# Patient Record
Sex: MALE | Race: WHITE | NOT HISPANIC OR LATINO | Employment: PART TIME | URBAN - METROPOLITAN AREA
[De-identification: names, ages, dates, MRNs, and addresses within clinical notes are randomized per-mention and may not be internally consistent; named-entity substitution may affect disease eponyms.]

---

## 2017-09-19 ENCOUNTER — GENERIC CONVERSION - ENCOUNTER (OUTPATIENT)
Dept: OTHER | Facility: OTHER | Age: 52
End: 2017-09-19

## 2017-10-17 ENCOUNTER — ANESTHESIA EVENT (OUTPATIENT)
Dept: GASTROENTEROLOGY | Facility: AMBULARY SURGERY CENTER | Age: 52
End: 2017-10-17
Payer: COMMERCIAL

## 2017-10-17 RX ORDER — DOXAZOSIN MESYLATE 4 MG/1
4 TABLET ORAL
COMMUNITY

## 2017-10-17 RX ORDER — CHOLECALCIFEROL (VITAMIN D3) 1250 MCG
CAPSULE ORAL WEEKLY
COMMUNITY

## 2017-10-17 RX ORDER — TAMSULOSIN HYDROCHLORIDE 0.4 MG/1
0.4 CAPSULE ORAL
COMMUNITY

## 2017-10-17 NOTE — PRE-PROCEDURE INSTRUCTIONS
Pre-Surgery Instructions:   Medication Instructions    Cholecalciferol (VITAMIN D3) 39756 units CAPS Patient was instructed by Physician and understands   doxazosin (CARDURA) 4 mg tablet Patient was instructed by Physician and understands   tamsulosin (FLOMAX) 0 4 mg Patient was instructed by Physician and understands

## 2017-10-17 NOTE — ANESTHESIA PREPROCEDURE EVALUATION
BPH (benign prostatic hyperplasia)    Hypertension        Review of Systems/Medical History          Cardiovascular  Hypertension ,    Pulmonary       GI/Hepatic    Bowel prep       Prostatic disorder, benign prostatic hyperplasia       Endo/Other     GYN       Hematology   Musculoskeletal       Neurology   Psychology           Physical Exam    Airway    Mallampati score: II  TM Distance: >3 FB  Neck ROM: full     Dental       Cardiovascular  Rhythm: regular, Rate: normal,     Pulmonary  Breath sounds clear to auscultation,     Other Findings        Anesthesia Plan  ASA Score- 2       Anesthesia Type- IV sedation with anesthesia with ASA Monitors  Additional Monitors:   Airway Plan:           Induction- intravenous  Informed Consent- Anesthetic plan and risks discussed with patient

## 2017-10-18 ENCOUNTER — HOSPITAL ENCOUNTER (OUTPATIENT)
Facility: AMBULARY SURGERY CENTER | Age: 52
Setting detail: OUTPATIENT SURGERY
Discharge: HOME | End: 2017-10-18
Attending: INTERNAL MEDICINE | Admitting: INTERNAL MEDICINE
Payer: COMMERCIAL

## 2017-10-18 ENCOUNTER — ANESTHESIA (OUTPATIENT)
Dept: GASTROENTEROLOGY | Facility: AMBULARY SURGERY CENTER | Age: 52
End: 2017-10-18
Payer: COMMERCIAL

## 2017-10-18 ENCOUNTER — GENERIC CONVERSION - ENCOUNTER (OUTPATIENT)
Dept: OTHER | Facility: OTHER | Age: 52
End: 2017-10-18

## 2017-10-18 VITALS
HEART RATE: 80 BPM | OXYGEN SATURATION: 97 % | TEMPERATURE: 98.2 F | SYSTOLIC BLOOD PRESSURE: 155 MMHG | RESPIRATION RATE: 18 BRPM | HEIGHT: 71 IN | DIASTOLIC BLOOD PRESSURE: 88 MMHG

## 2017-10-18 RX ORDER — PROPOFOL 10 MG/ML
INJECTION, EMULSION INTRAVENOUS AS NEEDED
Status: DISCONTINUED | OUTPATIENT
Start: 2017-10-18 | End: 2017-10-18 | Stop reason: SURG

## 2017-10-18 RX ORDER — SODIUM CHLORIDE, SODIUM LACTATE, POTASSIUM CHLORIDE, CALCIUM CHLORIDE 600; 310; 30; 20 MG/100ML; MG/100ML; MG/100ML; MG/100ML
125 INJECTION, SOLUTION INTRAVENOUS CONTINUOUS
Status: DISCONTINUED | OUTPATIENT
Start: 2017-10-18 | End: 2017-10-18 | Stop reason: HOSPADM

## 2017-10-18 RX ADMIN — SODIUM CHLORIDE, SODIUM LACTATE, POTASSIUM CHLORIDE, AND CALCIUM CHLORIDE 125 ML/HR: .6; .31; .03; .02 INJECTION, SOLUTION INTRAVENOUS at 10:08

## 2017-10-18 RX ADMIN — SODIUM CHLORIDE, SODIUM LACTATE, POTASSIUM CHLORIDE, AND CALCIUM CHLORIDE: .6; .31; .03; .02 INJECTION, SOLUTION INTRAVENOUS at 10:19

## 2017-10-18 RX ADMIN — PROPOFOL 50 MG: 10 INJECTION, EMULSION INTRAVENOUS at 10:35

## 2017-10-18 RX ADMIN — PROPOFOL 100 MG: 10 INJECTION, EMULSION INTRAVENOUS at 10:30

## 2017-10-18 NOTE — OP NOTE
COLONOSCOPY    PROCEDURE: Colonoscopy    INDICATIONS: Rectal Bleeding    POST-OP DIAGNOSIS: See the impression below    SEDATION: Monitored anesthesia care, check anesthesia records    PHYSICAL EXAM:    /93   Pulse 89   Temp 98 2 °F (36 8 °C) (Tympanic)   Resp 18   Ht 5' 11" (1 803 m)   SpO2 99%   There is no height or weight on file to calculate BMI  General: NAD  Heart: S1 & S2 normal, RRR  Lungs: CTA, No rales or rhonchi  Abdomen: Soft, nontender, nondistended, good bowel sounds    CONSENT:  Informed consent was obtained for the procedure, including sedation after explaining the risks and benefits of the procedure  Risks including but not limited to bleeding, perforation, infection, aspiration were discussed in detail  Also explained about less than 100%$ sensitivity with the exam and other alternatives  PREPARATION:   EKG tracing, pulse oximetry, blood pressure were monitored throughout the procedure  Patient was identified by myself both verbally and by visual inspection of ID band  DESCRIPTION:   Patient was placed in the left lateral decubitus position and was sedated with the above medication  Digital rectal examination was performed  The colonoscope was introduced in to the anal canal and advanced up to cecum, which was identified by the appendiceal orifice and IC valve  A careful inspection was made as the colonoscope was withdrawn, including a retroflexed view of the rectum; findings and interventions are described below  Appropriate photodocumentation was obtained  The quality of the colonic preparation was adequate  FINDINGS:    1  Cecum and ileocecal valve-normal mucosa    2  Scarring was noted above the dentate line from previous banding  Small internal hemorrhoids seen           IMPRESSIONS:      As above    RECOMMENDATIONS:    Anusol HC suppositories twice a day as needed    Next colonoscopy in 10 years    COMPLICATIONS:  None; patient tolerated the procedure well     DISPOSITION: PACU           CONDITION: Stable

## 2017-10-18 NOTE — ANESTHESIA POSTPROCEDURE EVALUATION
Post-Op Assessment Note      CV Status:  Stable    Mental Status:  Somnolent    Hydration Status:  Stable    PONV Controlled:  None    Airway Patency:  Patent    Post Op Vitals Reviewed: Yes          Staff: Anesthesiologist           BP      Temp      Pulse     Resp      SpO2

## 2017-10-18 NOTE — H&P
History and Physical -  [facility]  Yinka Mejia 47 y o  male MRN: [id number]        HPI:  58-year-old male with history of hypertension reports having fresh bleeding from the rectum  He had hemorrhoidal banding in the past     Historical Information   Past Medical History:   Diagnosis Date    BPH (benign prostatic hyperplasia)     Hypertension      Past Surgical History:   Procedure Laterality Date    HEMORRHOID SURGERY       Social History   History   Alcohol Use No     History   Drug Use No     History   Smoking Status    Never Smoker   Smokeless Tobacco    Never Used     Family History   Problem Relation Age of Onset    Heart disease Mother     Cancer Father      stomach    Hypertension Father        Meds/Allergies     Prescriptions Prior to Admission   Medication    Cholecalciferol (VITAMIN D3) 91746 units CAPS    doxazosin (CARDURA) 4 mg tablet    tamsulosin (FLOMAX) 0 4 mg       No Known Allergies    Objective     Blood pressure 158/93, pulse 89, temperature 98 2 °F (36 8 °C), temperature source Tympanic, resp  rate 18, height 5' 11" (1 803 m), SpO2 99 %      PHYSICAL EXAM:    Gen: NAD  CV: S1 & S2 normal, RRR  CHEST: Clear to auscultate  ABD: soft, NT/ND, good bowel sounds  EXT: no edema    ASSESSMENT:     Rectal bleeding    PLAN:    Colonoscopy

## 2017-10-23 ENCOUNTER — GENERIC CONVERSION - ENCOUNTER (OUTPATIENT)
Dept: OTHER | Facility: OTHER | Age: 52
End: 2017-10-23

## 2018-01-10 NOTE — MISCELLANEOUS
October 23, 2017    Dear Jose Barr,    We have attempted to contact you  Please contact our office at your convenience       Thank you,    [facility]        Electronically signed by:Carolann Hawkins MA  Oct 23 2017  8:36AM EST

## 2018-01-22 VITALS
TEMPERATURE: 98.8 F | RESPIRATION RATE: 16 BRPM | SYSTOLIC BLOOD PRESSURE: 124 MMHG | HEART RATE: 99 BPM | WEIGHT: 224.38 LBS | HEIGHT: 71 IN | OXYGEN SATURATION: 98 % | BODY MASS INDEX: 31.41 KG/M2 | DIASTOLIC BLOOD PRESSURE: 70 MMHG

## 2018-09-05 ENCOUNTER — OFFICE VISIT (OUTPATIENT)
Dept: PODIATRY | Facility: CLINIC | Age: 53
End: 2018-09-05
Payer: COMMERCIAL

## 2018-09-05 VITALS
DIASTOLIC BLOOD PRESSURE: 92 MMHG | HEIGHT: 71 IN | WEIGHT: 224 LBS | SYSTOLIC BLOOD PRESSURE: 136 MMHG | BODY MASS INDEX: 31.36 KG/M2

## 2018-09-05 DIAGNOSIS — M77.51 CAPSULITIS OF METATARSOPHALANGEAL (MTP) JOINT OF RIGHT FOOT: ICD-10-CM

## 2018-09-05 DIAGNOSIS — L60.0 INGROWN TOENAIL: ICD-10-CM

## 2018-09-05 DIAGNOSIS — Q66.52 CONGENITAL PES PLANUS OF LEFT FOOT: ICD-10-CM

## 2018-09-05 DIAGNOSIS — M72.2 PLANTAR FASCIITIS: Primary | ICD-10-CM

## 2018-09-05 DIAGNOSIS — M20.41 HAMMER TOE OF RIGHT FOOT: ICD-10-CM

## 2018-09-05 DIAGNOSIS — Q66.51 CONGENITAL PES PLANUS OF RIGHT FOOT: ICD-10-CM

## 2018-09-05 DIAGNOSIS — B35.1 ONYCHOMYCOSIS: ICD-10-CM

## 2018-09-05 PROCEDURE — L3000 FT INSERT UCB BERKELEY SHELL: HCPCS | Performed by: PODIATRIST

## 2018-09-05 PROCEDURE — 99213 OFFICE O/P EST LOW 20 MIN: CPT | Performed by: PODIATRIST

## 2018-09-05 NOTE — PROGRESS NOTES
Assessment/Plan:    Cast orthotics bilateral feet sulcus length graphite intrinsic heel    Applied toe strapping to right 2nd digit in order to straighten toe  Patient was instructed on how to apply this himself in order to facilitate proper healing    Discussed possible need for surgery if pain and deformity not resolved    Partial nail avulsion bilateral hallux nails reduced    Discussed proper shoes  Discussed stretching icing  Discussed NSAIDs risks and benefits  Follow-up 1 month       Diagnoses and all orders for this visit:    Plantar fasciitis    Congenital pes planus of right foot    Congenital pes planus of left foot    Ingrown toenail    Onychomycosis          Subjective:      Patient ID: Demarcus Mejia is a 47 y o  male  Patient has history of chronic heel pain bilateral   Patient has been wearing old orthotics but says they have not been helping  Rates pain in feet right foot 6/10 pain scale, left foot 3/10 pain scale  Pain worse 1st step in morning  Patient also has mild nail fungus and gets recurrent ingrown nails bilateral big toes  Patient has not noticed any redness or signs of infection  The patient bumped his right foot 3 months ago and since then he has had some medial deviation at 2nd MPJ of the 2nd digit  This causes some pain in the toe there is a contraction of the toe and mild hammertoe  Patient does get some pain in 2nd MP joint on off          Past Medical History:   Diagnosis Date    BPH (benign prostatic hyperplasia)     Hypertension          Current Outpatient Prescriptions:     Cholecalciferol (VITAMIN D3) 86213 units CAPS, Take by mouth once a week, Disp: , Rfl:     doxazosin (CARDURA) 4 mg tablet, Take 4 mg by mouth daily at bedtime, Disp: , Rfl:     tamsulosin (FLOMAX) 0 4 mg, Take 0 4 mg by mouth daily with dinner, Disp: , Rfl:     Past Surgical History:   Procedure Laterality Date    COLONOSCOPY N/A 10/18/2017    Procedure: COLONOSCOPY;  Surgeon: Glenroy Crump Kalen Medeiros MD;  Location: [place] [address] [facility] LAB; Service: Gastroenterology    HEMORRHOID SURGERY         No Known Allergies    There is no problem list on file for this patient  Review of Systems   Constitutional: Negative  HENT: Negative  Eyes: Negative  Respiratory: Negative  Cardiovascular: Negative  Gastrointestinal: Negative  Endocrine: Negative  Genitourinary: Negative  Musculoskeletal: Negative  Skin: Negative  Allergic/Immunologic: Negative  Neurological: Negative  Hematological: Negative  Psychiatric/Behavioral: Negative  Objective:  Patient's shoes and socks were removed, feet examined       /92   Ht 5' 11" (1 803 m)   Wt 102 kg (224 lb)   BMI 31 24 kg/m²          Physical Exam      Pulses palpable bilateral PT DP and PT pulses 2/4 bilateral  Significant pes planus bilateral, there is decreased  of the arch on weight-bearing  Right 2nd digit there is medial deviation of the PIPJ mild pain on palpation of 2nd MPJ mild swelling  Severe hallux limitus bilateral left worse than right less than 15° of dorsiflexion 1st ray  No hypermobility of the 1st ray  Muscle strength 5/5 all groups bilateral feet and ankles  Moderate equinus bilateral 5° dorsiflexion knee flexed and extended  Negative edema negative erythema no signs of infection  Nails are thickened discolored dystrophic mild onychomycosis  Ingrown nail both borders bilateral hallux

## 2018-11-28 ENCOUNTER — OFFICE VISIT (OUTPATIENT)
Dept: PODIATRY | Facility: CLINIC | Age: 53
End: 2018-11-28
Payer: COMMERCIAL

## 2018-11-28 VITALS
BODY MASS INDEX: 31.36 KG/M2 | HEIGHT: 71 IN | DIASTOLIC BLOOD PRESSURE: 82 MMHG | HEART RATE: 79 BPM | SYSTOLIC BLOOD PRESSURE: 131 MMHG | RESPIRATION RATE: 17 BRPM | WEIGHT: 224 LBS

## 2018-11-28 DIAGNOSIS — M20.42 HAMMER TOES OF BOTH FEET: ICD-10-CM

## 2018-11-28 DIAGNOSIS — M20.41 HAMMER TOES OF BOTH FEET: ICD-10-CM

## 2018-11-28 DIAGNOSIS — M72.2 PLANTAR FASCIITIS: ICD-10-CM

## 2018-11-28 DIAGNOSIS — L60.0 INGROWN TOENAIL: Primary | ICD-10-CM

## 2018-11-28 PROCEDURE — 99213 OFFICE O/P EST LOW 20 MIN: CPT | Performed by: PODIATRIST

## 2018-11-28 NOTE — PROGRESS NOTES
Assessment/Plan:    Patient was given a toe  for bilateral 2nd digit  Aseptic debridement and planning of nails x10 and manually and mechanically  Wedge resection of ingrown nail left hallux medial border  Discussed possible need for matricectomy  Patient will wear orthotics daily continue with stretching icing  Follow-up 2 months     Diagnoses and all orders for this visit:    Ingrown toenail    Hammer toes of both feet    Plantar fasciitis          Subjective:      Patient ID: Candice Manzanares is a 46 y o  male  Patient is follow-up for bilateral plantar fasciitis many years  Has gotten new orthotics has been stretching and icing pain is improved rates pain 4/10 pain scale pain is on off  Patient has chronic ingrown nail left big toe medial border  Has not noticed any redness or signs of infection but has been having pain  Patient has pain in contracted 2nd digit hammertoe bilateral right worse than left  Past Medical History:   Diagnosis Date    BPH (benign prostatic hyperplasia)     Hypertension          Current Outpatient Prescriptions:     Cholecalciferol (VITAMIN D3) 49741 units CAPS, Take by mouth once a week, Disp: , Rfl:     doxazosin (CARDURA) 4 mg tablet, Take 4 mg by mouth daily at bedtime, Disp: , Rfl:     tamsulosin (FLOMAX) 0 4 mg, Take 0 4 mg by mouth daily with dinner, Disp: , Rfl:     Past Surgical History:   Procedure Laterality Date    COLONOSCOPY N/A 10/18/2017    Procedure: COLONOSCOPY;  Surgeon: Karissa Hayden MD;  Location: [facility]; Service: Gastroenterology    HEMORRHOID SURGERY         No Known Allergies    There is no problem list on file for this patient  Review of Systems   Constitutional: Negative  HENT: Negative  Eyes: Negative  Respiratory: Negative  Cardiovascular: Negative  Gastrointestinal: Negative  Endocrine: Negative  Genitourinary: Negative  Musculoskeletal: Negative  Skin: Negative  Allergic/Immunologic: Negative  Neurological: Negative  Hematological: Negative  Psychiatric/Behavioral: Negative  Objective:  Patient's shoes and socks were removed, feet examined  /82   Pulse 79   Resp 17   Ht 5' 11" (1 803 m)   Wt 102 kg (224 lb)   BMI 31 24 kg/m²          Physical Exam      Pulses palpable bilateral PT DP and PT pulses 2/4 bilateral  Significant pes planus bilateral, there is decreased  of the arch on weight-bearing  Right 2nd digit there is medial deviation of the PIPJ mild pain on palpation of 2nd MPJ mild swelling  Severe hallux limitus bilateral left worse than right less than 15° of dorsiflexion 1st ray  No hypermobility of the 1st ray  Muscle strength 5/5 all groups bilateral feet and ankles  Moderate equinus bilateral 5° dorsiflexion knee flexed and extended  Negative edema negative erythema no signs of infection  Nails are thickened discolored dystrophic mild onychomycosis  Ingrown nail both borders bilateral hallux    Ingrown right hallux medial border    Mild hyperkeratotic lesion on the medial border but no sign of infection  Mild pain on palpation plantar medial heel bilateral  Some rigid hammertoes 2nd digit bilateral

## 2019-02-13 ENCOUNTER — OFFICE VISIT (OUTPATIENT)
Dept: PODIATRY | Facility: CLINIC | Age: 54
End: 2019-02-13
Payer: COMMERCIAL

## 2019-02-13 VITALS
HEART RATE: 89 BPM | BODY MASS INDEX: 31.36 KG/M2 | RESPIRATION RATE: 17 BRPM | HEIGHT: 71 IN | SYSTOLIC BLOOD PRESSURE: 124 MMHG | WEIGHT: 224 LBS | DIASTOLIC BLOOD PRESSURE: 85 MMHG

## 2019-02-13 DIAGNOSIS — L60.0 INGROWN TOENAIL: Primary | ICD-10-CM

## 2019-02-13 DIAGNOSIS — B35.1 ONYCHOMYCOSIS: ICD-10-CM

## 2019-02-13 DIAGNOSIS — M72.2 PLANTAR FASCIITIS: ICD-10-CM

## 2019-02-13 PROCEDURE — 99213 OFFICE O/P EST LOW 20 MIN: CPT | Performed by: PODIATRIST

## 2019-02-13 NOTE — PROGRESS NOTES
Assessment/Plan:  Partial resection of ingrown nail left hallux medial border applied silver nitrate    Aseptic debridement and planning of nails x10 and manually and mechanically  Patient will wear orthotics daily  Discussed importance of stretching and icing  Patient will consider injection or physical therapy  Follow-up 2 months     Diagnoses and all orders for this visit:    Ingrown toenail    Plantar fasciitis    Onychomycosis          Subjective:      Patient ID: Rachel Gaspar is a 46 y o  male  Patient is follow-up for bilateral plantar fasciitis many years  Has been wearing orthotics  Pain is improved  His mostly just getting pain in morning and later in day  Rates pain between 2 and 410 on a pain scale  Has been stretching but not daily  Patient has recurrent ingrown nail left hallux medial border  Does have some swelling in the border but has not noticed any redness or drainage      Past Medical History:   Diagnosis Date    BPH (benign prostatic hyperplasia)     Hypertension          Current Outpatient Medications:     Cholecalciferol (VITAMIN D3) 84280 units CAPS, Take by mouth once a week, Disp: , Rfl:     doxazosin (CARDURA) 4 mg tablet, Take 4 mg by mouth daily at bedtime, Disp: , Rfl:     tamsulosin (FLOMAX) 0 4 mg, Take 0 4 mg by mouth daily with dinner, Disp: , Rfl:     Past Surgical History:   Procedure Laterality Date    COLONOSCOPY N/A 10/18/2017    Procedure: COLONOSCOPY;  Surgeon: Derek Robertson MD;  Location: [place] [address] GI LAB; Service: Gastroenterology    HEMORRHOID SURGERY         No Known Allergies    There is no problem list on file for this patient  Review of Systems   Constitutional: Negative  HENT: Negative  Eyes: Negative  Respiratory: Negative  Cardiovascular: Negative  Gastrointestinal: Negative  Endocrine: Negative  Genitourinary: Negative  Musculoskeletal: Negative  Skin: Negative  Allergic/Immunologic: Negative      Neurological: Negative  Hematological: Negative  Psychiatric/Behavioral: Negative  Objective:  Patient's shoes and socks were removed, feet examined  /85   Pulse 89   Resp 17   Ht 5' 11" (1 803 m)   Wt 102 kg (224 lb)   BMI 31 24 kg/m²          Physical Exam      Pulses palpable bilateral PT DP and PT pulses 2/4 bilateral  Significant pes planus bilateral, there is decreased  of the arch on weight-bearing  Right 2nd digit there is medial deviation of the PIPJ mild pain on palpation of 2nd MPJ mild swelling  Severe hallux limitus bilateral left worse than right less than 15° of dorsiflexion 1st ray  No hypermobility of the 1st ray  Muscle strength 5/5 all groups bilateral feet and ankles  Moderate equinus bilateral 5° dorsiflexion knee flexed and extended  Negative edema negative erythema no signs of infection  Nails are thickened discolored dystrophic mild onychomycosis  Ingrown nail both borders bilateral hallux    Left hallux medial border positive ingrown mild swelling mild paronychia negative erythema negative purulence negative abscess    Mild pain on palpation plantar medial heel right worse than left    Significant pes planus bilateral  Negative erythema no edema no signs of infection

## 2019-04-24 ENCOUNTER — OFFICE VISIT (OUTPATIENT)
Dept: PODIATRY | Facility: CLINIC | Age: 54
End: 2019-04-24
Payer: COMMERCIAL

## 2019-04-24 VITALS
HEIGHT: 71 IN | BODY MASS INDEX: 31.36 KG/M2 | DIASTOLIC BLOOD PRESSURE: 80 MMHG | WEIGHT: 224 LBS | SYSTOLIC BLOOD PRESSURE: 125 MMHG

## 2019-04-24 DIAGNOSIS — Q66.52 CONGENITAL PES PLANUS OF LEFT FOOT: ICD-10-CM

## 2019-04-24 DIAGNOSIS — M72.2 PLANTAR FASCIITIS: Primary | ICD-10-CM

## 2019-04-24 DIAGNOSIS — Q66.51 CONGENITAL PES PLANUS OF RIGHT FOOT: ICD-10-CM

## 2019-04-24 PROCEDURE — 99213 OFFICE O/P EST LOW 20 MIN: CPT | Performed by: PODIATRIST

## 2019-07-03 ENCOUNTER — OFFICE VISIT (OUTPATIENT)
Dept: PODIATRY | Facility: CLINIC | Age: 54
End: 2019-07-03
Payer: COMMERCIAL

## 2019-07-03 VITALS — HEIGHT: 71 IN | WEIGHT: 224 LBS | BODY MASS INDEX: 31.36 KG/M2

## 2019-07-03 DIAGNOSIS — M20.41 HAMMER TOES OF BOTH FEET: ICD-10-CM

## 2019-07-03 DIAGNOSIS — M20.42 HAMMER TOES OF BOTH FEET: ICD-10-CM

## 2019-07-03 DIAGNOSIS — M72.2 PLANTAR FASCIITIS: Primary | ICD-10-CM

## 2019-07-03 PROCEDURE — 99213 OFFICE O/P EST LOW 20 MIN: CPT | Performed by: PODIATRIST

## 2019-07-03 NOTE — PROGRESS NOTES
Assessment/Plan:    Patient will use Voltaren Gel sparingly discussed side effects of medication patient will use only as needed   Injection or physical therapy if not improving  Continue with orthotics continue with stretching icing  Nails reduced  Discussed surgical conservative options for hammertoes  Patient given hammertoe cushion  Follow-up 1 month     Diagnoses and all orders for this visit:    Plantar fasciitis  -     diclofenac sodium (VOLTAREN) 1 %; Apply 4 g topically 4 (four) times a day    Hammer toes of both feet          Subjective:      Patient ID: Winston Miranda is a 46 y o  male  Patient is is follow-up for bilateral heel pain right worse than left  Patient rates pain right 5/10 pain scale pain left 3/10 pain scale  Patient has been wearing orthotics patient has been stretching icing wearing better shoes  Patient has pain when when walking and standing  Patient also have significant pain in hammertoes bilateral right worse than left  Most pain in the right 2nd digit hammertoe  Pain mostly in certain shoes  Past Medical History:   Diagnosis Date    BPH (benign prostatic hyperplasia)     Hypertension          Current Outpatient Medications:     Cholecalciferol (VITAMIN D3) 67241 units CAPS, Take by mouth once a week, Disp: , Rfl:     diclofenac sodium (VOLTAREN) 1 %, Apply 4 g topically 4 (four) times a day, Disp: 4 Tube, Rfl: 1    doxazosin (CARDURA) 4 mg tablet, Take 4 mg by mouth daily at bedtime, Disp: , Rfl:     tamsulosin (FLOMAX) 0 4 mg, Take 0 4 mg by mouth daily with dinner, Disp: , Rfl:     Past Surgical History:   Procedure Laterality Date    COLONOSCOPY N/A 10/18/2017    Procedure: COLONOSCOPY;  Surgeon: Lewis Ortiz MD;  Location: [place] [address] GI LAB; Service: Gastroenterology    HEMORRHOID SURGERY         No Known Allergies    There is no problem list on file for this patient  Review of Systems   Constitutional: Negative  HENT: Negative  Eyes: Negative  Respiratory: Negative  Cardiovascular: Negative  Gastrointestinal: Negative  Endocrine: Negative  Genitourinary: Negative  Musculoskeletal: Negative  Skin: Negative  Allergic/Immunologic: Negative  Neurological: Negative  Hematological: Negative  Psychiatric/Behavioral: Negative  Objective:  Patient's shoes and socks were removed, feet examined       Ht 5' 11" (1 803 m)   Wt 102 kg (224 lb)   BMI 31 24 kg/m²          Physical Exam      Pulses palpable bilateral PT DP and PT pulses 2/4 bilateral  Significant pes planus bilateral, there is decreased  of the arch on weight-bearing  Right 2nd digit there is medial deviation of the PIPJ mild pain on palpation of 2nd MPJ mild swelling  Severe hallux limitus bilateral left worse than right less than 15° of dorsiflexion 1st ray  No hypermobility of the 1st ray  Muscle strength 5/5 all groups bilateral feet and ankles      Negative edema negative erythema no signs of infection  Nails are thickened discolored dystrophic mild onychomycosis    Severe pes planus bilateral  Significant equinus bilateral 0° dorsiflexion knee flexed and extended  Pain on palpation plantar medial heel right worse than left negative erythema negative edema  Plantar fascia is intact  Muscle strength intact all groups    Painful hammertoe right 2nd digit Pain dorsal PIPJ

## 2019-09-05 ENCOUNTER — OFFICE VISIT (OUTPATIENT)
Dept: PODIATRY | Facility: CLINIC | Age: 54
End: 2019-09-05
Payer: COMMERCIAL

## 2019-09-05 VITALS
HEART RATE: 93 BPM | HEIGHT: 71 IN | BODY MASS INDEX: 31.36 KG/M2 | RESPIRATION RATE: 17 BRPM | SYSTOLIC BLOOD PRESSURE: 132 MMHG | DIASTOLIC BLOOD PRESSURE: 86 MMHG | WEIGHT: 224 LBS

## 2019-09-05 DIAGNOSIS — Q66.52 CONGENITAL PES PLANUS OF LEFT FOOT: ICD-10-CM

## 2019-09-05 DIAGNOSIS — M79.672 PAIN IN BOTH FEET: ICD-10-CM

## 2019-09-05 DIAGNOSIS — B35.3 TINEA PEDIS OF BOTH FEET: ICD-10-CM

## 2019-09-05 DIAGNOSIS — M72.2 PLANTAR FASCIITIS: Primary | ICD-10-CM

## 2019-09-05 DIAGNOSIS — Q66.51 CONGENITAL PES PLANUS OF RIGHT FOOT: ICD-10-CM

## 2019-09-05 DIAGNOSIS — M79.671 PAIN IN BOTH FEET: ICD-10-CM

## 2019-09-05 PROCEDURE — L3000 FT INSERT UCB BERKELEY SHELL: HCPCS | Performed by: PODIATRIST

## 2019-09-05 PROCEDURE — 99213 OFFICE O/P EST LOW 20 MIN: CPT | Performed by: PODIATRIST

## 2019-09-05 NOTE — PROGRESS NOTES
Expand All Collapse All    Assessment/Plan:     Patient will use Voltaren Gel sparingly discussed side effects of medication patient will use only as needed   Injection or physical therapy if not improving  Continue with orthotics continue with stretching icing    Patient's feet casted for custom molded foot orthotics  Nails reduced  Discussed surgical conservative options for hammertoes  Patient given hammertoe cushion  Follow-up in 2 months       Diagnoses and all orders for this visit:     Plantar fasciitis  -     diclofenac sodium (VOLTAREN) 1 %; Apply 4 g topically 4 (four) times a day     Hammer toes of both feet            Subjective:       Patient ID: Kory Yanes is a 46 y o  male      Patient is is follow-up for bilateral heel pain right worse than left  Patient rates pain right 5/10 pain scale pain left 3/10 pain scale  Patient has been wearing orthotics patient has been stretching icing wearing better shoes  Patient has pain when when walking and standing  Patient also have significant pain in hammertoes bilateral right worse than left  Most pain in the right 2nd digit hammertoe  Pain mostly in certain shoes         Medical History        Past Medical History:   Diagnosis Date    BPH (benign prostatic hyperplasia)      Hypertension                 Current Outpatient Medications:     Cholecalciferol (VITAMIN D3) 15712 units CAPS, Take by mouth once a week, Disp: , Rfl:     diclofenac sodium (VOLTAREN) 1 %, Apply 4 g topically 4 (four) times a day, Disp: 4 Tube, Rfl: 1    doxazosin (CARDURA) 4 mg tablet, Take 4 mg by mouth daily at bedtime, Disp: , Rfl:     tamsulosin (FLOMAX) 0 4 mg, Take 0 4 mg by mouth daily with dinner, Disp: , Rfl:      Surgical History         Past Surgical History:   Procedure Laterality Date    COLONOSCOPY N/A 10/18/2017     Procedure: COLONOSCOPY;  Surgeon: Fariha Alanis MD;  Location: [facility] GI [facility];   Service: Gastroenterology    HEMORRHOID SURGERY           No Known Allergies     There is no problem list on file for this patient         Review of Systems   Constitutional: Negative  HENT: Negative  Eyes: Negative  Respiratory: Negative  Cardiovascular: Negative  Gastrointestinal: Negative  Endocrine: Negative  Genitourinary: Negative  Musculoskeletal: Negative  Skin: Negative  Allergic/Immunologic: Negative  Neurological: Negative  Hematological: Negative      Psychiatric/Behavioral: Negative            Objective:  Patient's shoes and socks were removed, feet examined       Ht 5' 11" (1 803 m)   Wt 102 kg (224 lb)   BMI 31 24 kg/m²             Physical Exam       Pulses palpable bilateral PT DP and PT pulses 2/4 bilateral  Significant pes planus bilateral, there is decreased  of the arch on weight-bearing  Right 2nd digit there is medial deviation of the PIPJ mild pain on palpation of 2nd MPJ mild swelling  Severe hallux limitus bilateral left worse than right less than 15° of dorsiflexion 1st ray  No hypermobility of the 1st ray  Muscle strength 5/5 all groups bilateral feet and ankles        Negative edema negative erythema no signs of infection  Nails are thickened discolored dystrophic mild onychomycosis     Severe pes planus bilateral  Significant equinus bilateral 0° dorsiflexion knee flexed and extended  Pain on palpation plantar medial heel right worse than left negative erythema negative edema  Plantar fascia is intact  Muscle strength intact all groups     Painful hammertoe right 2nd digit Pain dorsal PIPJ

## 2019-09-20 ENCOUNTER — TELEPHONE (OUTPATIENT)
Dept: GASTROENTEROLOGY | Facility: CLINIC | Age: 54
End: 2019-09-20

## 2019-09-20 NOTE — TELEPHONE ENCOUNTER
called both number, no voice mail and second number a property company   will mail out letter informing patient of cancellation of apt on 09/26/2019

## 2019-11-14 ENCOUNTER — OFFICE VISIT (OUTPATIENT)
Dept: PODIATRY | Facility: CLINIC | Age: 54
End: 2019-11-14
Payer: COMMERCIAL

## 2019-11-14 VITALS
DIASTOLIC BLOOD PRESSURE: 80 MMHG | SYSTOLIC BLOOD PRESSURE: 138 MMHG | HEIGHT: 71 IN | WEIGHT: 224 LBS | RESPIRATION RATE: 17 BRPM | BODY MASS INDEX: 31.36 KG/M2

## 2019-11-14 DIAGNOSIS — M79.671 PAIN IN BOTH FEET: ICD-10-CM

## 2019-11-14 DIAGNOSIS — M72.2 PLANTAR FASCIITIS: Primary | ICD-10-CM

## 2019-11-14 DIAGNOSIS — M79.672 PAIN IN BOTH FEET: ICD-10-CM

## 2019-11-14 DIAGNOSIS — M20.42 HAMMER TOES OF BOTH FEET: ICD-10-CM

## 2019-11-14 DIAGNOSIS — L60.0 INGROWN TOENAIL: ICD-10-CM

## 2019-11-14 DIAGNOSIS — M20.41 HAMMER TOES OF BOTH FEET: ICD-10-CM

## 2019-11-14 PROCEDURE — 99213 OFFICE O/P EST LOW 20 MIN: CPT | Performed by: PODIATRIST

## 2019-11-14 RX ORDER — MELOXICAM 7.5 MG/1
7.5 TABLET ORAL DAILY
Qty: 10 TABLET | Refills: 0 | Status: SHIPPED | OUTPATIENT
Start: 2019-11-14 | End: 2019-11-24

## 2019-11-14 NOTE — PROGRESS NOTES
Assessment/Plan:     Patient will use Voltaren Gel sparingly discussed side effects of medication patient will use only as needed   Injection or physical therapy if not improving  Continue with orthotics continue with stretching and icing  We will add Mobic         Nails reduced  Discussed surgical conservative options for hammertoes  Patient given hammertoe cushion  Follow-up in 2 months       Diagnoses and all orders for this visit:     Plantar fasciitis  -     diclofenac sodium (VOLTAREN) 1 %; Apply 4 g topically 4 (four) times a day     Hammer toes of both feet           Subjective:       Patient ID: Jose Olson is a 46 y  o  male      Patient is is follow-up for bilateral heel pain right worse than left   Patient rates pain right 5/10 pain scale pain left 3/10 pain scale   Patient has been wearing orthotics patient has been stretching icing wearing better shoes   Patient has pain when when walking and standing  Patient also have significant pain in hammertoes bilateral right worse than left   Most pain in the right 2nd digit hammertoe   Pain mostly in certain shoes         Medical History           Past Medical History:   Diagnosis Date    BPH (benign prostatic hyperplasia)      Hypertension                 Current Outpatient Medications:     Cholecalciferol (VITAMIN D3) 62506 units CAPS, Take by mouth once a week, Disp: , Rfl:     diclofenac sodium (VOLTAREN) 1 %, Apply 4 g topically 4 (four) times a day, Disp: 4 Tube, Rfl: 1    doxazosin (CARDURA) 4 mg tablet, Take 4 mg by mouth daily at bedtime, Disp: , Rfl:     tamsulosin (FLOMAX) 0 4 mg, Take 0 4 mg by mouth daily with dinner, Disp: , Rfl:      Surgical History             Past Surgical History:   Procedure Laterality Date    COLONOSCOPY N/A 10/18/2017     Procedure: COLONOSCOPY;  Surgeon: Chelsea Miller MD;  Location: [place] [address] [facility];  Service: Gastroenterology    HEMORRHOID SURGERY                No Known Allergies     There is no problem list on file for this patient         Review of Systems   Constitutional: Negative     HENT: Negative     Eyes: Negative     Respiratory: Negative     Cardiovascular: Negative     Gastrointestinal: Negative     Endocrine: Negative     Genitourinary: Negative     Musculoskeletal: Negative     Skin: Negative     Allergic/Immunologic: Negative     Neurological: Negative     Hematological: Negative     Psychiatric/Behavioral: Negative           Objective:  Patient's shoes and socks were removed, feet examined       Ht 5' 11" (1 803 m)   Wt 102 kg (224 lb)   BMI 31 24 kg/m²             Physical Exam       Pulses palpable bilateral PT DP and PT pulses 2/4 bilateral  Significant pes planus bilateral, there is decreased  of the arch on weight-bearing  Right 2nd digit there is medial deviation of the PIPJ mild pain on palpation of 2nd MPJ mild swelling  Severe hallux limitus bilateral left worse than right less than 15° of dorsiflexion 1st ray  No hypermobility of the 1st ray  Muscle strength 5/5 all groups bilateral feet and ankles        Negative edema negative erythema no signs of infection  Nails are thickened discolored dystrophic mild onychomycosis     Severe pes planus bilateral  Significant equinus bilateral 0° dorsiflexion knee flexed and extended  Pain on palpation plantar medial heel right worse than left negative erythema negative edema  Plantar fascia is intact  Muscle strength intact all groups     Painful hammertoe right 2nd digit Pain dorsal PIPJ

## 2019-11-20 ENCOUNTER — OFFICE VISIT (OUTPATIENT)
Dept: GASTROENTEROLOGY | Facility: CLINIC | Age: 54
End: 2019-11-20
Payer: COMMERCIAL

## 2019-11-20 VITALS
DIASTOLIC BLOOD PRESSURE: 86 MMHG | TEMPERATURE: 98.4 F | HEART RATE: 88 BPM | BODY MASS INDEX: 31.99 KG/M2 | HEIGHT: 71 IN | SYSTOLIC BLOOD PRESSURE: 144 MMHG | WEIGHT: 228.5 LBS

## 2019-11-20 DIAGNOSIS — R19.5 DARK STOOLS: Primary | ICD-10-CM

## 2019-11-20 PROCEDURE — 99214 OFFICE O/P EST MOD 30 MIN: CPT | Performed by: INTERNAL MEDICINE

## 2019-11-20 RX ORDER — MELATONIN
1000 DAILY
COMMUNITY

## 2019-11-20 NOTE — ASSESSMENT & PLAN NOTE
Reported history of few dark-colored bowel movements couple of months ago- probable diet related  Doubt for GI bleeding but rule out peptic ulcer disease or gastric erosions  Patient was also concerned because of his father's history of stomach cancer  Last colonoscopy was couple of years ago     -schedule for upper endoscopy    -advised patient to call if he develops any recurrent symptoms    -Patient was explained about  the risks and benefits of the procedure  Risks including but not limited to bleeding, infection, perforation were explained in detail  Also explained about less than 100% sensitivity with the exam and other alternatives

## 2019-11-20 NOTE — PROGRESS NOTES
Follow-up Note -  [facility]  Sachin Tanner 1965 male         Reason:  Blood in stool    HPI:  Mr Rolan Dow came in because of dark-colored bowel movement couple of times about 2 months ago  Reports having regular light colored bowel movements now  Denies any fresh bleeding  Good appetite, no recent weight loss  No abdominal pain, nausea or vomiting  Denies any heartburn acid reflux  Denies any difficulty swallowing  Denies any NSAID use  He is anxious and concerned because of his father's history of stomach cancer  I did colonoscopy on him couple of years ago which was normal     REVIEW OF SYSTEMS: Review of Systems   Constitutional: Negative for activity change, appetite change, chills, diaphoresis, fatigue, fever and unexpected weight change  HENT: Negative for ear discharge, ear pain, facial swelling, hearing loss, nosebleeds, sore throat, tinnitus and voice change  Eyes: Negative for pain, discharge, redness, itching and visual disturbance  Respiratory: Negative for apnea, cough, chest tightness, shortness of breath and wheezing  Cardiovascular: Negative for chest pain and palpitations  Gastrointestinal:        As noted in HPI   Endocrine: Negative for cold intolerance, heat intolerance and polyuria  Genitourinary: Negative for difficulty urinating, dysuria, flank pain, hematuria and urgency  Musculoskeletal: Negative for arthralgias, back pain, gait problem, joint swelling and myalgias  Skin: Negative for rash and wound  Neurological: Negative for dizziness, tremors, seizures, speech difficulty, light-headedness, numbness and headaches  Hematological: Negative for adenopathy  Does not bruise/bleed easily  Psychiatric/Behavioral: Negative for agitation, behavioral problems and confusion  The patient is not nervous/anxious           Past Medical History:   Diagnosis Date    BPH (benign prostatic hyperplasia)     Hypertension       Past Surgical History:   Procedure Laterality Date    COLONOSCOPY N/A 10/18/2017    Procedure: COLONOSCOPY;  Surgeon: Kennedy Aldana MD;  Location: [place] [address] [facility]; Service: Gastroenterology    HEMORRHOID SURGERY       Social History     Socioeconomic History    Marital status: Single     Spouse name: Not on file    Number of children: Not on file    Years of education: Not on file    Highest education level: Not on file   Occupational History    Not on file   Social Needs    Financial resource strain: Not on file    Food insecurity:     Worry: Not on file     Inability: Not on file    Transportation needs:     Medical: Not on file     Non-medical: Not on file   Tobacco Use    Smoking status: Never Smoker    Smokeless tobacco: Never Used   Substance and Sexual Activity    Alcohol use: Yes     Comment: occasionally     Drug use: No    Sexual activity: Not on file   Lifestyle    Physical activity:     Days per week: Not on file     Minutes per session: Not on file    Stress: Not on file   Relationships    Social connections:     Talks on phone: Not on file     Gets together: Not on file     Attends [employer] service: Not on file     Active member of club or organization: Not on file     Attends meetings of clubs or organizations: Not on file     Relationship status: Not on file    Intimate partner violence:     Fear of current or ex partner: Not on file     Emotionally abused: Not on file     Physically abused: Not on file     Forced sexual activity: Not on file   Other Topics Concern    Not on file   Social History Narrative    Not on file     Family History   Problem Relation Age of Onset    Heart disease Mother     Cancer Father         stomach    Hypertension Father     Stomach cancer Father     Colon cancer Maternal Aunt      Patient has no known allergies    Current Outpatient Medications   Medication Sig Dispense Refill    cholecalciferol (VITAMIN D3) 1,000 units tablet Take 1,000 Units by mouth daily      diclofenac sodium (VOLTAREN) 1 % Apply 4 g topically 4 (four) times a day (Patient taking differently: Apply 4 g topically 4 (four) times a day ) 4 Tube 1    doxazosin (CARDURA) 4 mg tablet Take 4 mg by mouth daily at bedtime      tamsulosin (FLOMAX) 0 4 mg Take 0 4 mg by mouth daily with dinner      Cholecalciferol (VITAMIN D3) 08544 units CAPS Take by mouth once a week      meloxicam (MOBIC) 7 5 mg tablet Take 1 tablet (7 5 mg total) by mouth daily for 10 days (Patient not taking: Reported on 11/20/2019) 10 tablet 0     No current facility-administered medications for this visit  Blood pressure 144/86, pulse 88, temperature 98 4 °F (36 9 °C), height 5' 11" (1 803 m), weight 104 kg (228 lb 8 oz)  PHYSICAL EXAM: Physical Exam   Constitutional: He is oriented to person, place, and time  He appears well-developed  HENT:   Head: Normocephalic and atraumatic  Mouth/Throat: Oropharynx is clear and moist    Eyes: Pupils are equal, round, and reactive to light  Conjunctivae are normal  Right eye exhibits no discharge  Left eye exhibits no discharge  No scleral icterus  Neck: Neck supple  No JVD present  No tracheal deviation present  No thyromegaly present  Cardiovascular: Normal rate, regular rhythm, normal heart sounds and intact distal pulses  Exam reveals no gallop and no friction rub  No murmur heard  Pulmonary/Chest: Effort normal and breath sounds normal  No respiratory distress  He has no wheezes  He has no rales  He exhibits no tenderness  Abdominal: Soft  Bowel sounds are normal  He exhibits no distension and no mass  There is no tenderness  There is no rebound and no guarding  No hernia  Genitourinary:   Genitourinary Comments: Rectal exam-empty rectal vault  Musculoskeletal: He exhibits no edema  Lymphadenopathy:     He has no cervical adenopathy  Neurological: He is alert and oriented to person, place, and time  Skin: Skin is warm and dry  No rash noted   No erythema  Psychiatric: He has a normal mood and affect  His behavior is normal  Thought content normal         No results found for: WBC, HGB, HCT, MCV, PLT  No results found for: GLUCOSE, CALCIUM, NA, K, CO2, CL, BUN, CREATININE  No results found for: ALT, AST, GGT, ALKPHOS, BILITOT  No results found for: INR, PROTIME    No results found  ASSESSMENT & PLAN:    Dark stools  Reported history of few dark-colored bowel movements couple of months ago- probable diet related  Doubt for GI bleeding but rule out peptic ulcer disease or gastric erosions  Patient was also concerned because of his father's history of stomach cancer  Last colonoscopy was couple of years ago     -schedule for upper endoscopy    -advised patient to call if he develops any recurrent symptoms    -Patient was explained about  the risks and benefits of the procedure  Risks including but not limited to bleeding, infection, perforation were explained in detail  Also explained about less than 100% sensitivity with the exam and other alternatives

## 2020-01-09 ENCOUNTER — OFFICE VISIT (OUTPATIENT)
Dept: PODIATRY | Facility: CLINIC | Age: 55
End: 2020-01-09
Payer: COMMERCIAL

## 2020-01-09 VITALS
HEIGHT: 71 IN | SYSTOLIC BLOOD PRESSURE: 132 MMHG | BODY MASS INDEX: 31.92 KG/M2 | HEART RATE: 76 BPM | WEIGHT: 228 LBS | DIASTOLIC BLOOD PRESSURE: 78 MMHG | RESPIRATION RATE: 16 BRPM

## 2020-01-09 DIAGNOSIS — M79.672 PAIN IN BOTH FEET: ICD-10-CM

## 2020-01-09 DIAGNOSIS — B35.1 ONYCHOMYCOSIS: ICD-10-CM

## 2020-01-09 DIAGNOSIS — M20.41 HAMMER TOES OF BOTH FEET: ICD-10-CM

## 2020-01-09 DIAGNOSIS — M79.671 PAIN IN BOTH FEET: ICD-10-CM

## 2020-01-09 DIAGNOSIS — M20.42 HAMMER TOES OF BOTH FEET: ICD-10-CM

## 2020-01-09 DIAGNOSIS — M72.2 PLANTAR FASCIITIS: Primary | ICD-10-CM

## 2020-01-09 PROCEDURE — 99213 OFFICE O/P EST LOW 20 MIN: CPT | Performed by: PODIATRIST

## 2020-01-09 NOTE — PROGRESS NOTES
Signed  Encounter Date: 11/14/2019   Assessment/Plan:     Patient will use Voltaren Gel sparingly discussed side effects of medication patient will use only as needed   Injection or physical therapy if not improving  Continue with orthotics continue with stretching and icing         Nails reduced  Discussed surgical conservative options for hammertoes  Patient given hammertoe cushion  Follow-up in 2 months       Diagnoses and all orders for this visit:     Plantar fasciitis  -     Hammer toes of both feet   Pain upon ambulation  Pes planus        Subjective:       Patient ID: Jose Olson is a 53 y  o  male      Patient is is follow-up for bilateral heel pain right worse than left   Patient rates pain right 5/10 pain scale pain left 3/10 pain scale   Patient has been wearing orthotics patient has been stretching icing wearing better shoes   Patient has pain when when walking and standing  Patient also have significant pain in hammertoes bilateral right worse than left   Most pain in the right 2nd digit hammertoe   Pain mostly in certain shoes         Medical History           Past Medical History:   Diagnosis Date    BPH (benign prostatic hyperplasia)      Hypertension                 Current Outpatient Medications:     Cholecalciferol (VITAMIN D3) 80282 units CAPS, Take by mouth once a week, Disp: , Rfl:     diclofenac sodium (VOLTAREN) 1 %, Apply 4 g topically 4 (four) times a day, Disp: 4 Tube, Rfl: 1    doxazosin (CARDURA) 4 mg tablet, Take 4 mg by mouth daily at bedtime, Disp: , Rfl:     tamsulosin (FLOMAX) 0 4 mg, Take 0 4 mg by mouth daily with dinner, Disp: , Rfl:      Surgical History             Past Surgical History:   Procedure Laterality Date    COLONOSCOPY N/A 10/18/2017     Procedure: COLONOSCOPY;  Surgeon: Bruce Guzman MD;  Location: [facility];  Service: Gastroenterology    HEMORRHOID SURGERY                No Known Allergies     There is no problem list on file for this patient         Review of Systems   Constitutional: Negative     HENT: Negative     Eyes: Negative     Respiratory: Negative     Cardiovascular: Negative     Gastrointestinal: Negative     Endocrine: Negative     Genitourinary: Negative     Musculoskeletal: Negative     Skin: Negative     Allergic/Immunologic: Negative     Neurological: Negative     Hematological: Negative     Psychiatric/Behavioral: Negative           Objective:  Patient's shoes and socks were removed, feet examined       Ht 5' 11" (1 803 m)   Wt 102 kg (224 lb)   BMI 31 24 kg/m²             Physical Exam       Pulses palpable bilateral PT DP and PT pulses 2/4 bilateral  Significant pes planus bilateral, there is decreased  of the arch on weight-bearing  Right 2nd digit there is medial deviation of the PIPJ mild pain on palpation of 2nd MPJ mild swelling  Severe hallux limitus bilateral left worse than right less than 15° of dorsiflexion 1st ray  No hypermobility of the 1st ray  Muscle strength 5/5 all groups bilateral feet and ankles        Negative edema negative erythema no signs of infection  Nails are thickened discolored dystrophic mild onychomycosis     Severe pes planus bilateral  Significant equinus bilateral 0° dorsiflexion knee flexed and extended  Pain on palpation plantar medial heel right worse than left negative erythema negative edema  Plantar fascia is intact  Muscle strength intact all groups     Painful hammertoe right 2nd digit Pain dorsal PIPJ

## 2020-03-12 ENCOUNTER — OFFICE VISIT (OUTPATIENT)
Dept: PODIATRY | Facility: CLINIC | Age: 55
End: 2020-03-12
Payer: COMMERCIAL

## 2020-03-12 VITALS
WEIGHT: 228 LBS | DIASTOLIC BLOOD PRESSURE: 82 MMHG | HEIGHT: 71 IN | HEART RATE: 89 BPM | RESPIRATION RATE: 16 BRPM | SYSTOLIC BLOOD PRESSURE: 132 MMHG | BODY MASS INDEX: 31.92 KG/M2

## 2020-03-12 DIAGNOSIS — L60.0 INGROWN TOENAIL: ICD-10-CM

## 2020-03-12 DIAGNOSIS — M20.42 HAMMER TOES OF BOTH FEET: ICD-10-CM

## 2020-03-12 DIAGNOSIS — M79.672 PAIN IN BOTH FEET: Primary | ICD-10-CM

## 2020-03-12 DIAGNOSIS — B35.1 ONYCHOMYCOSIS: ICD-10-CM

## 2020-03-12 DIAGNOSIS — M79.671 PAIN IN BOTH FEET: Primary | ICD-10-CM

## 2020-03-12 DIAGNOSIS — M20.41 HAMMER TOES OF BOTH FEET: ICD-10-CM

## 2020-03-12 DIAGNOSIS — M20.41 HAMMER TOE OF RIGHT FOOT: ICD-10-CM

## 2020-03-12 PROCEDURE — 99213 OFFICE O/P EST LOW 20 MIN: CPT | Performed by: PODIATRIST

## 2020-03-12 NOTE — PROGRESS NOTES
Assessment/Plan:  Pain upon ambulation  Hammertoe formation  Pes planus  Need for orthotics  Ingrown toenail  Paronychia  Plan  Foot exam performed  Patient educated on condition  Patient will use orthotics daily  He will stretch  All nails debrided without pain or complication  Diagnoses and all orders for this visit:    Pain in both feet    Onychomycosis    Hammer toes of both feet    Ingrown toenail    Hammer toe of right foot          Subjective:  Patient has pain upon ambulation  He has pain in his toes with ambulation  He has painful hammertoes  He uses orthotics daily  No history of trauma    No Known Allergies      Current Outpatient Medications:     cholecalciferol (VITAMIN D3) 1,000 units tablet, Take 1,000 Units by mouth daily, Disp: , Rfl:     Cholecalciferol (VITAMIN D3) 94249 units CAPS, Take by mouth once a week, Disp: , Rfl:     diclofenac sodium (VOLTAREN) 1 %, Apply 4 g topically 4 (four) times a day (Patient taking differently: Apply 4 g topically 4 (four) times a day ), Disp: 4 Tube, Rfl: 1    doxazosin (CARDURA) 4 mg tablet, Take 4 mg by mouth daily at bedtime, Disp: , Rfl:     meloxicam (MOBIC) 7 5 mg tablet, Take 1 tablet (7 5 mg total) by mouth daily for 10 days (Patient not taking: Reported on 11/20/2019), Disp: 10 tablet, Rfl: 0    tamsulosin (FLOMAX) 0 4 mg, Take 0 4 mg by mouth daily with dinner, Disp: , Rfl:     Patient Active Problem List   Diagnosis    Plantar fasciitis    Pain in both feet    Congenital pes planus of right foot    Congenital pes planus of left foot    Tinea pedis of both feet    Dark stools          Patient ID: Jewel Knight is a 53 y o  male  HPI    The following portions of the patient's history were reviewed and updated as appropriate:     family history includes Cancer in his father; Colon cancer in his maternal aunt; Heart disease in his mother; Hypertension in his father; Stomach cancer in his father        reports that he has never smoked  He has never used smokeless tobacco  He reports that he drinks alcohol  He reports that he does not use drugs  Vitals:    03/12/20 0927   BP: 132/82   Pulse: 89   Resp: 16       Review of Systems      Objective:  Patient's shoes and socks removed  Foot ExamPhysical Exam         Pulses palpable bilateral PT DP and PT pulses 2/4 bilateral  Significant pes planus bilateral, there is decreased  of the arch on weight-bearing  Right 2nd digit there is medial deviation of the PIPJ mild pain on palpation of 2nd MPJ mild swelling  Severe hallux limitus bilateral left worse than right less than 15° of dorsiflexion 1st ray  No hypermobility of the 1st ray  Muscle strength 5/5 all groups bilateral feet and ankles  Moderate equinus bilateral 5° dorsiflexion knee flexed and extended  Negative edema negative erythema no signs of infection  Nails are thickened discolored dystrophic mild onychomycosis  Ingrown nail both borders bilateral hallux     Ingrown right hallux medial border    Mild hyperkeratotic lesion on the medial border but no sign of infection  Mild pain on palpation plantar medial heel bilateral  Some rigid hammertoes 2nd digit bilateral

## 2020-05-14 ENCOUNTER — OFFICE VISIT (OUTPATIENT)
Dept: PODIATRY | Facility: CLINIC | Age: 55
End: 2020-05-14
Payer: COMMERCIAL

## 2020-05-14 VITALS
HEIGHT: 71 IN | RESPIRATION RATE: 16 BRPM | DIASTOLIC BLOOD PRESSURE: 82 MMHG | BODY MASS INDEX: 31.92 KG/M2 | HEART RATE: 89 BPM | WEIGHT: 228 LBS | SYSTOLIC BLOOD PRESSURE: 132 MMHG

## 2020-05-14 DIAGNOSIS — M79.671 PAIN IN BOTH FEET: Primary | ICD-10-CM

## 2020-05-14 DIAGNOSIS — M79.672 PAIN IN BOTH FEET: Primary | ICD-10-CM

## 2020-05-14 DIAGNOSIS — M20.42 HAMMER TOES OF BOTH FEET: ICD-10-CM

## 2020-05-14 DIAGNOSIS — L60.0 INGROWN TOENAIL: ICD-10-CM

## 2020-05-14 DIAGNOSIS — B35.1 ONYCHOMYCOSIS: ICD-10-CM

## 2020-05-14 DIAGNOSIS — M20.41 HAMMER TOES OF BOTH FEET: ICD-10-CM

## 2020-05-14 PROCEDURE — 99213 OFFICE O/P EST LOW 20 MIN: CPT | Performed by: PODIATRIST

## 2021-01-20 ENCOUNTER — APPOINTMENT (OUTPATIENT)
Dept: RADIOLOGY | Facility: CLINIC | Age: 56
End: 2021-01-20
Payer: COMMERCIAL

## 2021-01-20 ENCOUNTER — OFFICE VISIT (OUTPATIENT)
Dept: OBGYN CLINIC | Facility: CLINIC | Age: 56
End: 2021-01-20
Payer: COMMERCIAL

## 2021-01-20 VITALS
TEMPERATURE: 97.7 F | SYSTOLIC BLOOD PRESSURE: 155 MMHG | HEART RATE: 87 BPM | WEIGHT: 255.2 LBS | DIASTOLIC BLOOD PRESSURE: 88 MMHG | BODY MASS INDEX: 35.73 KG/M2 | HEIGHT: 71 IN

## 2021-01-20 DIAGNOSIS — M79.642 PAIN IN LEFT HAND: ICD-10-CM

## 2021-01-20 DIAGNOSIS — R22.32 MASS OF FINGER OF LEFT HAND: Primary | ICD-10-CM

## 2021-01-20 PROCEDURE — 73120 X-RAY EXAM OF HAND: CPT

## 2021-01-20 PROCEDURE — 99203 OFFICE O/P NEW LOW 30 MIN: CPT | Performed by: ORTHOPAEDIC SURGERY

## 2021-01-20 NOTE — PROGRESS NOTES
Assessment/Plan:  1  Mass of finger of left hand  Ambulatory referral to Dermatology   2  Pain in left hand  XR hand 2 vw left       Scribe Attestation    I,:  Terrance Ceron am acting as a scribe while in the presence of the attending physician :       I,:  Richard Concepcion MD personally performed the services described in this documentation    as scribed in my presence :             Frankie Alegre upon examination and review the x-rays of the left hand does demonstrate signs and symptoms consistent with a prominent dorsal pad of the PIP joint of the index finger  I did remark that there are no obvious findings on x-ray to explain this prominence on his finger  I did note that this is likely just a prominent dorsal pad  I did remark that this is a benign mass and is likely attributed to increase in scar tissue  I did note that it is superficial, mobile, and nonpainful  I do not recommend surgical intervention to have this removed as does not appear to be a true mass  I did remark however that there is a high chance that he may develop similar prominent dorsal pads on other fingers  He does not demonstrate any palmar cording concerning for Dupuytren's although these dorsal pads are common in Dupuytren's patients  He does have concerns as he does feel this is unsightly and does wish to have another opinion in regards to potential treatments for this  I did provide him with a referral to Dermatology and Dr Machelle Alegre verbalized understanding of all information provided to him today and had no further questions  I will see him back on an as-needed basis  Subjective:   Kelly Story is a 63 y o  male who presents to the office today for  Initial evaluation of his left index finger  He states that he has recently noticed mass to dorsal aspect PIP joint of the index finger  It is the ulnar aspect of the PIP joint  He denies a traumatic injury that resulted in the swelling of his finger    He also denies any history disorder such as Dupuytren's or history of osteoarthritis that may be contributing to the unusual presentation of the finger  He denies any mechanical symptoms such as popping, clicking, or locking  He also denies any pain  He notes that feels that the bump is unsightly does wish to consider options to have it removed  Today he denies any distal paresthesias  Review of Systems   Constitutional: Negative for chills, fever and unexpected weight change  HENT: Negative for hearing loss, nosebleeds and sore throat  Eyes: Negative for pain, redness and visual disturbance  Respiratory: Negative for cough, shortness of breath and wheezing  Cardiovascular: Negative for chest pain, palpitations and leg swelling  Gastrointestinal: Negative for abdominal pain, nausea and vomiting  Endocrine: Negative for polyphagia and polyuria  Genitourinary: Negative for dysuria and hematuria  Musculoskeletal: Positive for arthralgias and myalgias  See HPI   Skin: Negative for rash and wound  Neurological: Negative for dizziness, numbness and headaches  Psychiatric/Behavioral: Negative for decreased concentration and suicidal ideas  The patient is not nervous/anxious  Past Medical History:   Diagnosis Date    BPH (benign prostatic hyperplasia)     Hypertension        Past Surgical History:   Procedure Laterality Date    COLONOSCOPY N/A 10/18/2017    Procedure: COLONOSCOPY;  Surgeon: Jessi Duncan MD;  Location: [place] [address] [facility]; Service: Gastroenterology    HEMORRHOID SURGERY         Family History   Problem Relation Age of Onset    Heart disease Mother     Cancer Father         stomach    Hypertension Father     Stomach cancer Father     Colon cancer Maternal Aunt        Social History     Occupational History    Not on file   Tobacco Use    Smoking status: Never Smoker    Smokeless tobacco: Never Used   Substance and Sexual Activity    Alcohol use:  Yes Comment: occasionally     Drug use: No    Sexual activity: Not on file         Current Outpatient Medications:     cholecalciferol (VITAMIN D3) 1,000 units tablet, Take 1,000 Units by mouth daily, Disp: , Rfl:     Cholecalciferol (VITAMIN D3) 32720 units CAPS, Take by mouth once a week, Disp: , Rfl:     doxazosin (CARDURA) 4 mg tablet, Take 4 mg by mouth daily at bedtime, Disp: , Rfl:     tamsulosin (FLOMAX) 0 4 mg, Take 0 4 mg by mouth daily with dinner, Disp: , Rfl:     diclofenac sodium (VOLTAREN) 1 %, Apply 4 g topically 4 (four) times a day (Patient taking differently: Apply 4 g topically 4 (four) times a day ), Disp: 4 Tube, Rfl: 1    meloxicam (MOBIC) 7 5 mg tablet, Take 1 tablet (7 5 mg total) by mouth daily for 10 days (Patient not taking: Reported on 11/20/2019), Disp: 10 tablet, Rfl: 0    No Known Allergies    Objective:  Vitals:    01/20/21 0840   BP: 155/88   Pulse: 87   Temp: 97 7 °F (36 5 °C)       Left Hand Exam     Tenderness   The patient is experiencing no tenderness  Range of Motion   Wrist   Extension: 50   Flexion: 80   Pronation: 90   Supination: 90   Hand   MP Index: 90   PIP Index: 90   DIP Index: 90     Muscle Strength   Wrist extension: 5/5   Wrist flexion: 5/5   :  5/5     Other   Erythema: absent  Scars: absent  Sensation: normal  Pulse: present    Comments:    Prominent dorsal pad at the PIP joint of the index finger  It  Is firm, superficial, mobile, nonpainful  Strength/Myotome Testing     Left Wrist/Hand   Wrist extension: 5  Wrist flexion: 5      Physical Exam  Vitals signs reviewed  HENT:      Head: Normocephalic and atraumatic  Eyes:      General:         Right eye: No discharge  Left eye: No discharge  Conjunctiva/sclera: Conjunctivae normal       Pupils: Pupils are equal, round, and reactive to light  Neck:      Musculoskeletal: Normal range of motion and neck supple  Cardiovascular:      Rate and Rhythm: Normal rate     Pulmonary: Effort: Pulmonary effort is normal  No respiratory distress  Musculoskeletal:      Comments: As noted in HPI   Skin:     General: Skin is warm and dry  Neurological:      Mental Status: He is alert and oriented to person, place, and time  Psychiatric:         Mood and Affect: Mood normal          Behavior: Behavior normal          I have personally reviewed pertinent films in PACS and my interpretation is as follows:    X-rays of the left hand demonstrate no acute fracture other osseous abnormalities

## 2021-01-27 ENCOUNTER — CONSULT (OUTPATIENT)
Dept: DERMATOLOGY | Age: 56
End: 2021-01-27
Payer: COMMERCIAL

## 2021-01-27 VITALS — WEIGHT: 228 LBS | TEMPERATURE: 98 F | BODY MASS INDEX: 31.92 KG/M2 | HEIGHT: 71 IN

## 2021-01-27 DIAGNOSIS — R22.32 MASS OF FINGER OF LEFT HAND: ICD-10-CM

## 2021-01-27 PROCEDURE — 17110 DESTRUCTION B9 LES UP TO 14: CPT | Performed by: DERMATOLOGY

## 2021-01-27 PROCEDURE — 99243 OFF/OP CNSLTJ NEW/EST LOW 30: CPT | Performed by: DERMATOLOGY

## 2021-01-27 NOTE — PATIENT INSTRUCTIONS
ACQUIRED DIGITAL KERATOMA   Assessment and Plan:  Based on a thorough discussion of this condition and the management approach to it (including a comprehensive discussion of the known risks, side effects and potential benefits of treatment), the patient (family) agrees to implement the following specific plan:   Recommend treating with liquid nitrogen left 2nd digit   If doesn't improve with freezing can consider shave removal    Follow up in 1 month  Assessment and Plan:  A dermatofibroma is a common benign fibrous nodule that most often arises on the skin of the lower legs  A dermatofibroma is also called a "cutaneous fibrous histiocytoma "  Dermatofibromas occur at all ages and in people of every ethnicity  They are more common in women than in men  It is not clear if dermatofibroma is a reactive process or if it is a neoplasm  The lesions are made up of proliferating fibroblasts  Histiocytes may also be involved  They are sometimes attributed to an insect bite or ingrownhair or local trauma, but not consistently  They may be more numerous in patients with altered immunity  Dermatofibromas most often occur on the legs and arms, but may also arise on the trunk or any site of the body  Typical clinical features include the following:   People may have 1 or up to 15 lesions   Size varies from 0 5-1 5 cm diameter; most lesions are 7-10 mm diameter   They are firm nodules tethered to the skin surface and mobile over subcutaneous tissue   The skin "dimples" on pinching the lesion   Color may be pink to light brown in white skin, and dark brown to black in dark skin; some appear paler in the center   They do not usually cause symptoms, but they are sometimes painful or itchy   Because they are often raised lesions, they may be traumatized, for example by a razor     Occasionally dozens may erupt within a few months, usually in the setting of immunosuppression (for example autoimmune disease, cancer or certain medications)   Dermatofibroma does not give rise to cancer  However, occasionally, it may be mistaken for dermatofibrosarcoma or desmoplastic melanoma  A dermatofibroma is harmless and seldom causes any symptoms  Usually, only reassurance is needed  If it is nuisance or causing concern, the lesion can be removed surgically, resulting in a scar that is, by definition, usually longer in diameter than the widest portion of the dermatofibroma  Cryotherapy, shave biopsy and laser surgery are rarely completely successful  Skin punch biopsy or incisional biopsy may be undertaken if there is an atypical feature such as recent enlargement, ulceration, or asymmetrical structures and colours on dermatoscopy  PROCEDURE:  DESTRUCTION OF BENIGN LESIONS  After a thorough discussion of treatment options and risk/benefits/alternatives (including but not limited to local pain, scarring, dyspigmentation, blistering, and possible superinfection), verbal and written consent were obtained and the aforementioned lesions were treated on with cryotherapy using liquid nitrogen x 1 cycle for 5-10 seconds  The patient tolerated the procedure well, and after-care instructions were provided  SEBORRHEIC KERATOSIS; NON-INFLAMED    Assessment and Plan:  Based on a thorough discussion of this condition and the management approach to it (including a comprehensive discussion of the known risks, side effects and potential benefits of treatment), the patient (family) agrees to implement the following specific plan:   Reassured benign  Seborrheic Keratosis  A seborrheic keratosis is a harmless warty spot that appears during adult life as a common sign of skin aging  Seborrheic keratoses can arise on any area of skin, covered or uncovered, with the usual exception of the palms and soles  They do not arise from mucous membranes  Seborrheic keratoses can have highly variable appearance        Seborrheic keratoses are extremely common  It has been estimated that over 90% of adults over the age of 61 years have one or more of them  They occur in males and females of all races, typically beginning to erupt in the 35s or 45s  They are uncommon under the age of 21 years  The precise cause of seborrhoeic keratoses is not known  Seborrhoeic keratoses are considered degenerative in nature  As time goes by, seborrheic keratoses tend to become more numerous  Some people inherit a tendency to develop a very large number of them; some people may have hundreds of them  The name "seborrheic keratosis" is misleading, because these lesions are not limited to a seborrhoeic distribution (scalp, mid-face, chest, upper back), nor are they formed from sebaceous glands, nor are they associated with sebum -- which is greasy  Seborrheic keratosis may also be called "SK," "Seb K," "basal cell papilloma," "senile wart," or "barnacle "      Researchers have noted:   Eruptive seborrhoeic keratoses can follow sunburn or dermatitis   Skin friction may be the reason they appear in body folds   Viral cause (e g , human papillomavirus) seems unlikely   Stable and clonal mutations or activation of FRFR3, PIK3CA, LENORE, AKT1 and EGFR genes are found in seborrhoeic keratoses   Seborrhoeic keratosis can arise from solar lentigo   FRFR3 mutations also arise in solar lentigines  These mutations are associated with increased age and location on the head and neck, suggesting a role of ultraviolet radiation in these lesions   Seborrheic keratoses do not harbour tumour suppressor gene mutations   Epidermal growth factor receptor inhibitors, which are used to treat some cancers, often result in an increase in verrucal (warty) keratoses  There is no easy way to remove multiple lesions on a single occasion    Unless a specific lesion is "inflamed" and is causing pain or stinging/burning or is bleeding, most insurance companies do not authorize treatment

## 2021-01-27 NOTE — PROGRESS NOTES
[facility] Note     Patient Name: Ana Santiago  Encounter Date: 1/27/21     Have you been cared for by a [facility] Dermatologist in the last 3 years and, if so, which one? YES, Dr Tim Granado    · Have you traveled outside of the [address] [place] [address] in the past 3 months or outside of the [facility] area in the last 2 weeks? No     May we call your Preferred Phone number to discuss your specific medical information? Yes     May we leave a detailed message that includes your specific medical information? Yes      Today's Chief Concerns:   Concern #1:  Mass on left 2nd digit   Concern #2:      Past Medical History:  Have you personally ever had or currently have any of the following? · Skin cancer (such as Melanoma, Basal Cell Carcinoma, Squamous Cell Carcinoma? (If Yes, please provide more detail)- No  · Eczema: No  · Psoriasis: No  · HIV/AIDS: No  · Hepatitis B or C: No  · Tuberculosis: No  · Systemic Immunosuppression such as Diabetes, Biologic or Immunotherapy, Chemotherapy, Organ Transplantation, Bone Marrow Transplantation (If YES, please provide more detail): No  · Radiation Treatment (If YES, please provide more detail): No  · Any other major medical conditions/concerns? (If Yes, which types)- No    Social History:     What is/was your primary occupation?      What are your hobbies/past-times? Family History:  Have any of your "first degree relatives" (parent, brother, sister, or child) had any of the following       · Skin cancer such as Melanoma or Merkel Cell Carcinoma or Pancreatic Cancer? No  · Eczema, Asthma, Hay Fever or Seasonal Allergies: No  · Psoriasis or Psoriatic Arthritis: No  · Do any other medical conditions seem to run in your family? If Yes, what condition and which relatives?   No    Current Medications:   (please update all dermatological medications before printing patient's AVS!)      Current Outpatient Medications:    cholecalciferol (VITAMIN D3) 1,000 units tablet, Take 1,000 Units by mouth daily, Disp: , Rfl:     Cholecalciferol (VITAMIN D3) 90852 units CAPS, Take by mouth once a week, Disp: , Rfl:     diclofenac sodium (VOLTAREN) 1 %, Apply 4 g topically 4 (four) times a day (Patient taking differently: Apply 4 g topically 4 (four) times a day ), Disp: 4 Tube, Rfl: 1    doxazosin (CARDURA) 4 mg tablet, Take 4 mg by mouth daily at bedtime, Disp: , Rfl:     tamsulosin (FLOMAX) 0 4 mg, Take 0 4 mg by mouth daily with dinner, Disp: , Rfl:     meloxicam (MOBIC) 7 5 mg tablet, Take 1 tablet (7 5 mg total) by mouth daily for 10 days (Patient not taking: Reported on 11/20/2019), Disp: 10 tablet, Rfl: 0      Review of Systems:  Have you recently had or currently have any of the following? If YES, what are you doing for the problem? · Fever, chills or unintended weight loss: No  · Sudden loss or change in your vision: No  · Nausea, vomiting or blood in your stool: No  · Painful or swollen joints: No  · Wheezing or cough: No  · Changing mole or non-healing wound: No  · Nosebleeds: No  · Excessive sweating: No  · Easy or prolonged bleeding? No  · Over the last 2 weeks, how often have you been bothered by the following problems? · Taking little interest or pleasure in doing things: 1 - Not at All  · Feeling down, depressed, or hopeless: 1 - Not at All  · Rapid heartbeat with epinephrine:  No    · FEMALES ONLY:    · Are you pregnant or planning to become pregnant? N/A  · Are you currently or planning to be nursing or breast feeding? N/A    · Any known allergies? · No Known Allergies      Physical Exam:     Was a chaperone (Derm Clinical Assistant) present throughout the entire Physical Exam? Yes     Did the Dermatology Team specifically  the patient on the importance of a Full Skin Exam to be sure that nothing is missed clinically? Yes}  o Did the patient ultimately request or accept a Full Skin Exam?  NO   Patient declined  o Did the patient specifically refuse to have the areas "under-the-bra" examined by the Dermatologist? No  o Did the patient specifically refuse to have the areas "under-the-underwear" examined by the Dermatologist? No    CONSTITUTIONAL:   Vitals:    01/27/21 0915   Temp: 98 °F (36 7 °C)   TempSrc: Probe   Weight: 103 kg (228 lb)   Height: 5' 10 5" (1 791 m)           PSYCH: Normal mood and affect  EYES: Normal conjunctiva  ENT: Normal lips and oral mucosa  CARDIOVASCULAR: No edema  RESPIRATORY: Normal respirations  HEME/LYMPH/IMMUNO:  No regional lymphadenopathy except as noted below in "ASSESSMENT AND PLAN BY DIAGNOSIS"    SKIN:  FULL ORGAN SYSTEM EXAM   Hair, Scalp, Ears, Face Normal except as noted below in Assessment   Neck, Cervical Chain Nodes Normal except as noted below in Assessment   Right Arm/Hand/Fingers Normal except as noted below in Assessment   Left Arm/Hand/Fingers Normal except as noted below in Assessment   Chest/Breasts/Axillae Viewed areas Normal except as noted below in Assessment   Abdomen, Umbilicus Normal except as noted below in Assessment   Back/Spine Normal except as noted below in Assessment   Groin/Genitalia/Buttocks NOT EXAMINED   Right Leg, Foot, Toes Normal except as noted below in Assessment   Left Leg, Foot, Toes Normal except as noted below in Assessment        Assessment and Plan by Diagnosis:    History of Present Condition:     Duration:  How long has this been an issue for you?    o  1 year   Location Affected:  Where on the body is this affecting you?    o  left 2nd digit   Quality:  Is there any bleeding, pain, itch, burning/irritation, or redness associated with the skin lesion? o  denies   Severity:  Describe any bleeding, pain, itch, burning/irritation, or redness on a scale of 1 to 10 (with 10 being the worst)  o  denies   Timing:  Does this condition seem to be there pretty constantly or do you notice it more at specific times throughout the day? o  consitent   Context:  Have you ever noticed that this condition seems to be associated with specific activities you do?    o  denies   Modifying Factors:    o Anything that seems to make the condition worse?    -  denies  o What have you tried to do to make the condition better?    -  compound W   Associated Signs and Symptoms:  Does this skin lesion seem to be associated with any of the following:  o nothing    KNUCKLEPADS    Physical Exam:   Anatomic Location Affected:  Left 2nd digit   Morphological Description:  Hyperkeratotic tan 8 mm papule      Additional History of Present Condition:  See above    Assessment and Plan:  Based on a thorough discussion of this condition and the management approach to it (including a comprehensive discussion of the known risks, side effects and potential benefits of treatment), the patient (family) agrees to implement the following specific plan:   Recommend treating with liquid nitrogen left 2nd digit   If doesn't improve with freezing can consider shave removal    Follow up in 1 month  Assessment and Plan:       Knuckle pads most often occur on the index finger but they can affect any digit  They often run in families    Knuckle pads are harmless and seldom causes any symptoms  Usually, only reassurance is needed  If it is nuisance or causing concern, the lesion can be removed surgically,but they may recur and the procedure may leave a scar  PROCEDURE:  DESTRUCTION OF BENIGN LESIONS  After a thorough discussion of treatment options and risk/benefits/alternatives (including but not limited to local pain, scarring, dyspigmentation, blistering, and possible superinfection), verbal and written consent were obtained and the aforementioned lesions were treated on with cryotherapy using liquid nitrogen x 1 cycle for 5-10 seconds       TOTAL NUMBER of 4 lesions were treated today on the ANATOMIC LOCATION: left 2nd digit x 1, right cheek x 3     The patient tolerated the procedure well, and after-care instructions were provided  SEBORRHEIC KERATOSIS; NON-INFLAMED    Physical Exam:   Anatomic Location Affected:  Right cheek   Morphological Description:  Flat and raised, waxy, smooth to warty textured, yellow to brownish-grey to dark brown to blackish, discrete, "stuck-on" appearing papules  Additional History of Present Condition:  Patient reports new bumps on the skin  Denies itch, burn, pain, bleeding or ulceration  Present constantly; nothing seems to make it worse or better  No prior treatment  Assessment and Plan:  Based on a thorough discussion of this condition and the management approach to it (including a comprehensive discussion of the known risks, side effects and potential benefits of treatment), the patient (family) agrees to implement the following specific plan:   Reassured benign  Seborrheic Keratosis  A seborrheic keratosis is a harmless warty spot that appears during adult life as a common sign of skin aging  Seborrheic keratoses can arise on any area of skin, covered or uncovered, with the usual exception of the palms and soles  They do not arise from mucous membranes  Seborrheic keratoses can have highly variable appearance  Seborrheic keratoses are extremely common  It has been estimated that over 90% of adults over the age of 61 years have one or more of them  They occur in males and females of all races, typically beginning to erupt in the 35s or 45s  They are uncommon under the age of 21 years  The precise cause of seborrhoeic keratoses is not known  Seborrhoeic keratoses are considered degenerative in nature  As time goes by, seborrheic keratoses tend to become more numerous  Some people inherit a tendency to develop a very large number of them; some people may have hundreds of them      The name "seborrheic keratosis" is misleading, because these lesions are not limited to a seborrhoeic distribution (scalp, mid-face, chest, upper back), nor are they formed from sebaceous glands, nor are they associated with sebum -- which is greasy  Seborrheic keratosis may also be called "SK," "Seb K," "basal cell papilloma," "senile wart," or "barnacle "      Researchers have noted:   Eruptive seborrhoeic keratoses can follow sunburn or dermatitis   Skin friction may be the reason they appear in body folds   Viral cause (e g , human papillomavirus) seems unlikely   Stable and clonal mutations or activation of FRFR3, PIK3CA, LENORE, AKT1 and EGFR genes are found in seborrhoeic keratoses   Seborrhoeic keratosis can arise from solar lentigo   FRFR3 mutations also arise in solar lentigines  These mutations are associated with increased age and location on the head and neck, suggesting a role of ultraviolet radiation in these lesions   Seborrheic keratoses do not harbour tumour suppressor gene mutations   Epidermal growth factor receptor inhibitors, which are used to treat some cancers, often result in an increase in verrucal (warty) keratoses  There is no easy way to remove multiple lesions on a single occasion  Unless a specific lesion is "inflamed" and is causing pain or stinging/burning or is bleeding, most insurance companies do not authorize treatment    Scribe Attestation    I,:  Jagdish Gan am acting as a scribe while in the presence of the attending physician :       I,:  Raymundo Bhatt MD personally performed the services described in this documentation    as scribed in my presence :

## 2021-02-24 ENCOUNTER — OFFICE VISIT (OUTPATIENT)
Dept: DERMATOLOGY | Age: 56
End: 2021-02-24
Payer: COMMERCIAL

## 2021-02-24 VITALS — HEIGHT: 71 IN | BODY MASS INDEX: 32.45 KG/M2 | WEIGHT: 231.8 LBS | TEMPERATURE: 98.6 F

## 2021-02-24 DIAGNOSIS — R22.32 MASS OF FINGER OF LEFT HAND: Primary | ICD-10-CM

## 2021-02-24 PROCEDURE — 17110 DESTRUCTION B9 LES UP TO 14: CPT | Performed by: DERMATOLOGY

## 2021-02-24 PROCEDURE — 99213 OFFICE O/P EST LOW 20 MIN: CPT | Performed by: DERMATOLOGY

## 2021-02-24 NOTE — PROGRESS NOTES
[facility] Follow Up Note    Patient Name: Memo Carlin  Encounter Date: 2/24/21    Today's Chief Concerns:  Osker Ok Concern #1:  Linwood Potts Concern #2:    Osker Ok Concern #3:      Current Medications:    Current Outpatient Medications:     cholecalciferol (VITAMIN D3) 1,000 units tablet, Take 1,000 Units by mouth daily, Disp: , Rfl:     Cholecalciferol (VITAMIN D3) 49088 units CAPS, Take by mouth once a week, Disp: , Rfl:     diclofenac sodium (VOLTAREN) 1 %, Apply 4 g topically 4 (four) times a day (Patient taking differently: Apply 4 g topically 4 (four) times a day ), Disp: 4 Tube, Rfl: 1    doxazosin (CARDURA) 4 mg tablet, Take 4 mg by mouth daily at bedtime, Disp: , Rfl:     tamsulosin (FLOMAX) 0 4 mg, Take 0 4 mg by mouth daily with dinner, Disp: , Rfl:     meloxicam (MOBIC) 7 5 mg tablet, Take 1 tablet (7 5 mg total) by mouth daily for 10 days (Patient not taking: Reported on 11/20/2019), Disp: 10 tablet, Rfl: 0    CONSTITUTIONAL:   Vitals:    02/24/21 0943   Temp: 98 6 °F (37 °C)   TempSrc: Probe   Weight: 105 kg (231 lb 12 8 oz)   Height: 5' 10 5" (1 791 m)             Specific Alerts:    Have you been seen by a [facility] in the last 3 years? YES    Are you pregnant or planning to become pregnant? N/A    Are you currently or planning to be nursing or breast feeding? N/A    No Known Allergies    May we call your Preferred Phone number to discuss your specific medical information? YES    May we leave a detailed message that includes your specific medical information? YES    Have you traveled outside of the [facility] in the past 3 months? No    Do you currently have a pacemaker or defibrillator? No    Do you have any artificial heart valves, joints, plates, screws, rods, stents, pins, etc? No   - If Yes, were any placed within the last 2 years? Do you require any medications prior to a surgical procedure? No   - If Yes, for which procedure?     - If Yes, what medications to you require? Are you taking any medications that cause you to bleed more easily ("blood thinners") No    Have you ever experienced a rapid heartbeat with epinephrine? No    Have you ever been treated with "gold" (gold sodium thiomalate) therapy? No    [facility] can help with wrinkles, "laugh lines," facial volume loss, "double chin," "love handles," age spots, and more  Are you interested in learning today about some of the skin enhancement procedures that we offer? (If Yes, please provide more detail) No    Review of Systems:  Have you recently had or currently have any of the following?     · Fever or chills: No  · Night Sweats: No  · Headaches: No  · Weight Gain: No  · Weight Loss: No  · Blurry Vision: No  · Nausea: No  · Vomiting: No  · Diarrhea: No  · Blood in Stool: No  · Abdominal Pain: No  · Itchy Skin: No  · Painful Joints: No  · Swollen Joints: No  · Muscle Pain: No  · Irregular Mole: No  · Sun Burn: No  · Dry Skin: No  · Skin Color Changes: No  · Scar or Keloid: No  · Cold Sores/Fever Blisters: No  · Bacterial Infections/MRSA: No  · Anxiety: No  · Depression: No  · Suicidal or Homicidal Thoughts: No      PSYCH: Normal mood and affect  EYES: Normal conjunctiva  ENT: Normal lips and oral mucosa  CARDIOVASCULAR: No edema  RESPIRATORY: Normal respirations  HEME/LYMPH/IMMUNO:  No regional lymphadenopathy except as noted below in ASSESSMENT AND PLAN BY DIAGNOSIS    FULL ORGAN SYSTEM SKIN EXAM (SKIN)   Hair, Scalp, Ears, Face    Neck, Cervical Chain Nodes    Right Arm/Hand/Fingers    Left Finger 2nd digit Normal except as noted below in Assessment   Chest/Breasts/Axillae    Abdomen, Umbilicus    Back/Spine    Groin/Genitalia/Buttocks    Right Leg, Foot, Toes    Left Leg, Foot, Toes        KNUCKLEPADS  Physical Exam:   Anatomic Location Affected:  Left 2nd digit   Morphological Description:  1 cm pink lichenified plaque      Additional History of Present Condition:  Treated with liquid nitrogen    Assessment and Plan:  Based on a thorough discussion of this condition and the management approach to it (including a comprehensive discussion of the known risks, side effects and potential benefits of treatment), the patient (family) agrees to implement the following specific plan:   Treated with kenalog injection and liquid nitrogen left 2nd digit x 1 today's visit   Follow up in 3 months  A: PROCEDURE:  INTRALESIONAL STEROID INJECTION (KENALOG INJECTION)    Purpose: Triamcinolone is a synthetic glucocorticoid corticosteroid that has marked anti-inflammatory action  It is prepared in sterile aqueous suspension suitable for injecting directly into a lesion on or immediately below the skin to treat a dermal inflammatory process  Indications: It is indicated for alopecia areata; inflammatory acne cysts; discoid lupus erythematosus; keloids and hypertrophic scars; inflammatory lesions of granuloma annulare, lichen planus, lichen simplex chronicus (neurodermatitis), psoriatic plaques, and other localized inflammatory skin conditions  Potential Side Effects: I understand that triamcinolone injection can potentially cause early and/or delayed adverse effects such as:    Pain    Impaired wound healing    Increased hair growth    Bleeding    White or brown marks    Steroid acne    Infection    Telangiectasia    Skin thinning    Cutaneous and subcutaneous lipoatrophy (most common) appearing as skin indentations or dimples around the injection sites a few weeks after treatment     PROCEDURE NOTE:  After verbal and written consent were obtained, the to-be-treated area was wiped and cleaned with rubbing alcohol 70%  Then, a total of 0 25 mL of Kenalog CONCENTRATION:  10 mg/mL   (Lot# [id number];  Expiration Jan 2022, NDC#: 6619-0031-04) was injected intralesionally into a total of 1 lesion/s on the following anatomic areas:  Left 2nd digit using a 1-mL syringe and a 30-gauge needle  There was less than 1 mL of blood loss and little to no discomfort  The area was bandaged with a Band-aid  The patient tolerated the procedure well and remained in the office for observation  With no signs of an adverse reaction, the patient was eventually discharged from clinic  B: PROCEDURE:  DESTRUCTION OF BENIGN LESIONS  After a thorough discussion of treatment options and risk/benefits/alternatives (including but not limited to local pain, scarring, dyspigmentation, blistering, and possible superinfection), verbal and written consent were obtained and the aforementioned lesions were treated on with cryotherapy using liquid nitrogen x 1 cycle for 5-10 seconds   TOTAL NUMBER of 1 lesions were treated today on the ANATOMIC LOCATION: left 2nd digit x 1  The patient tolerated the procedure well, and after-care instructions were provided      Scribe Attestation    I,:  Justin Christine am acting as a scribe while in the presence of the attending physician :       I,:  Derek Ortiz MD personally performed the services described in this documentation    as scribed in my presence :

## 2021-02-24 NOTE — PATIENT INSTRUCTIONS
KETANPADS    Assessment and Plan:  Based on a thorough discussion of this condition and the management approach to it (including a comprehensive discussion of the known risks, side effects and potential benefits of treatment), the patient (family) agrees to implement the following specific plan:   Treated with kenalog injection and liquid nitrogen left 2nd digit x 1 today's visit   Follow up in 3 months  A: PROCEDURE:  INTRALESIONAL STEROID INJECTION (KENALOG INJECTION)    Purpose: Triamcinolone is a synthetic glucocorticoid corticosteroid that has marked anti-inflammatory action  It is prepared in sterile aqueous suspension suitable for injecting directly into a lesion on or immediately below the skin to treat a dermal inflammatory process  Indications: It is indicated for alopecia areata; inflammatory acne cysts; discoid lupus erythematosus; keloids and hypertrophic scars; inflammatory lesions of granuloma annulare, lichen planus, lichen simplex chronicus (neurodermatitis), psoriatic plaques, and other localized inflammatory skin conditions  Potential Side Effects: I understand that triamcinolone injection can potentially cause early and/or delayed adverse effects such as:    Pain    Impaired wound healing    Increased hair growth    Bleeding    White or brown marks    Steroid acne    Infection    Telangiectasia    Skin thinning    Cutaneous and subcutaneous lipoatrophy (most common) appearing as skin indentations or dimples around the injection sites a few weeks after treatment     PROCEDURE NOTE:  After verbal and written consent were obtained, the to-be-treated area was wiped and cleaned with rubbing alcohol 70%  There was less than 1 mL of blood loss and little to no discomfort  The area was bandaged with a Band-aid  The patient tolerated the procedure well and remained in the office for observation    With no signs of an adverse reaction, the patient was eventually discharged from clinic  B: PROCEDURE:  DESTRUCTION OF BENIGN LESIONS  After a thorough discussion of treatment options and risk/benefits/alternatives (including but not limited to local pain, scarring, dyspigmentation, blistering, and possible superinfection), verbal and written consent were obtained and the aforementioned lesions were treated on with cryotherapy using liquid nitrogen x 1 cycle for 5-10 seconds  The patient tolerated the procedure well, and after-care instructions were provided

## 2021-03-30 DIAGNOSIS — Z23 ENCOUNTER FOR IMMUNIZATION: ICD-10-CM

## 2021-04-08 ENCOUNTER — OFFICE VISIT (OUTPATIENT)
Dept: DERMATOLOGY | Age: 56
End: 2021-04-08
Payer: COMMERCIAL

## 2021-04-08 VITALS — WEIGHT: 231 LBS | HEIGHT: 71 IN | TEMPERATURE: 97.4 F | BODY MASS INDEX: 32.34 KG/M2

## 2021-04-08 DIAGNOSIS — R22.32 MASS OF FINGER OF LEFT HAND: Primary | ICD-10-CM

## 2021-04-08 PROCEDURE — 11900 INJECT SKIN LESIONS </W 7: CPT | Performed by: DERMATOLOGY

## 2021-04-08 PROCEDURE — 99213 OFFICE O/P EST LOW 20 MIN: CPT | Performed by: DERMATOLOGY

## 2021-04-08 NOTE — PROGRESS NOTES
[facility] Follow Up Note    Patient Name: Kimberly Best  Encounter Date: 04/08/2021     Today's Chief Concerns:  José Antonio Negron Concern #1:  Follow up      Current Medications:    Current Outpatient Medications:     cholecalciferol (VITAMIN D3) 1,000 units tablet, Take 1,000 Units by mouth daily, Disp: , Rfl:     Cholecalciferol (VITAMIN D3) 84654 units CAPS, Take by mouth once a week, Disp: , Rfl:     diclofenac sodium (VOLTAREN) 1 %, Apply 4 g topically 4 (four) times a day (Patient taking differently: Apply 4 g topically 4 (four) times a day ), Disp: 4 Tube, Rfl: 1    doxazosin (CARDURA) 4 mg tablet, Take 4 mg by mouth daily at bedtime, Disp: , Rfl:     tamsulosin (FLOMAX) 0 4 mg, Take 0 4 mg by mouth daily with dinner, Disp: , Rfl:     meloxicam (MOBIC) 7 5 mg tablet, Take 1 tablet (7 5 mg total) by mouth daily for 10 days (Patient not taking: Reported on 11/20/2019), Disp: 10 tablet, Rfl: 0    CONSTITUTIONAL:   Vitals:    04/08/21 1102   Temp: (!) 97 4 °F (36 3 °C)   Weight: 105 kg (231 lb)   Height: 5' 10 5" (1 791 m)             Specific Alerts:    Have you been seen by a [facility] in the last 3 years? YES    Are you pregnant or planning to become pregnant? N/A    Are you currently or planning to be nursing or breast feeding? N/A    No Known Allergies    May we call your Preferred Phone number to discuss your specific medical information? YES    May we leave a detailed message that includes your specific medical information? YES    Have you traveled outside of the [facility] in the past 3 months? No        Review of Systems:  Have you recently had or currently have any of the following?     · Fever or chills: No  · Night Sweats: No  · Headaches: No  · Weight Gain: No  · Weight Loss: No  · Blurry Vision: No  · Nausea: No  · Vomiting: No  · Diarrhea: No  · Blood in Stool: No  · Abdominal Pain: No  · Itchy Skin: No  · Painful Joints: No  · Swollen Joints: No  · Muscle Pain: No  · Irregular Mole: No  · Sun Burn: No  · Dry Skin: No  · Skin Color Changes: No  · Scar or Keloid: No  · Cold Sores/Fever Blisters: No  · Bacterial Infections/MRSA: No  · Anxiety: No  · Depression: No  · Suicidal or Homicidal Thoughts: No      PSYCH: Normal mood and affect  EYES: Normal conjunctiva  ENT: Normal lips and oral mucosa  CARDIOVASCULAR: No edema  RESPIRATORY: Normal respirations  HEME/LYMPH/IMMUNO:  No regional lymphadenopathy except as noted below in ASSESSMENT AND PLAN BY DIAGNOSIS    FULL ORGAN SYSTEM SKIN EXAM (SKIN)   Hair, Scalp, Ears, Face    Neck, Cervical Chain Nodes    /Hand/Fingers Normal except as noted below in Assessment   /Hand/Fingers Normal except as noted below in Assessment   Chest/Breasts/Axillae    Abdomen, Umbilicus    Back/Spine    Groin/Genitalia/Buttocks    Right Leg, Foot, Toes    Left Leg, Foot, Toes        FOLLOW-UP:EDDA  Physical Exam:  · Anatomic Location Affected:  Left 2nd digit  · Morphological Description:  1 cm pink lichenified plaque        Additional History of Present Condition:  Patient states much improved but still thinks it need another set of treatment      Assessment and Plan:  Based on a thorough discussion of this condition and the management approach to it (including a comprehensive discussion of the known risks, side effects and potential benefits of treatment), the patient (family) agrees to implement the following specific plan:  · Treated with kenalog injection and liquid nitrogen done today  · Follow up in 3 months  PROCEDURE:  INTRALESIONAL STEROID INJECTION (KENALOG INJECTION)    Purpose: Triamcinolone is a synthetic glucocorticoid corticosteroid that has marked anti-inflammatory action  It is prepared in sterile aqueous suspension suitable for injecting directly into a lesion on or immediately below the skin to treat a dermal inflammatory process  Indications:  It is indicated for alopecia areata; inflammatory acne cysts; discoid lupus erythematosus; keloids and hypertrophic scars; inflammatory lesions of granuloma annulare, lichen planus, lichen simplex chronicus (neurodermatitis), psoriatic plaques, and other localized inflammatory skin conditions  Potential Side Effects: I understand that triamcinolone injection can potentially cause early and/or delayed adverse effects such as:    Pain    Impaired wound healing    Increased hair growth    Bleeding    White or brown marks    Steroid acne    Infection    Telangiectasia    Skin thinning    Cutaneous and subcutaneous lipoatrophy (most common) appearing as skin indentations or dimples around the injection sites a few weeks after treatment     PROCEDURE NOTE:  After verbal and written consent were obtained, the to-be-treated area was wiped and cleaned with rubbing alcohol 70%  Then, a total of 0 2 mL of Kenalog CONCENTRATION:  10 mg/mL   (Lot# [id number]; Expiration 01/2022, NDC#: 7718-1234-51) was injected intralesionally into a total of 1 lesion/s on the following anatomic areas:  Left 2nd digit using a 1-mL syringe and a 30-gauge needle  There was less than 1 mL of blood loss and little to no discomfort  The area was bandaged with a Band-aid  The patient tolerated the procedure well and remained in the office for observation  With no signs of an adverse reaction, the patient was eventually discharged from clinic       Scribe Attestation    I,:  "[employer] am acting as a scribe while in the presence of the attending physician :       I,:  Lennox Fermin MD personally performed the services described in this documentation    as scribed in my presence :

## 2021-04-28 ENCOUNTER — OFFICE VISIT (OUTPATIENT)
Dept: DERMATOLOGY | Age: 56
End: 2021-04-28
Payer: COMMERCIAL

## 2021-04-28 ENCOUNTER — NURSE TRIAGE (OUTPATIENT)
Dept: OTHER | Facility: OTHER | Age: 56
End: 2021-04-28

## 2021-04-28 VITALS — BODY MASS INDEX: 34.22 KG/M2 | HEIGHT: 71 IN | WEIGHT: 244.4 LBS

## 2021-04-28 DIAGNOSIS — R22.32 MASS OF FINGER OF LEFT HAND: Primary | ICD-10-CM

## 2021-04-28 PROCEDURE — 99213 OFFICE O/P EST LOW 20 MIN: CPT | Performed by: DERMATOLOGY

## 2021-04-28 NOTE — TELEPHONE ENCOUNTER
Reason for Disposition   [1] Caller has URGENT question AND [2] triager unable to answer question    Answer Assessment - Initial Assessment Questions  1  SYMPTOM: "What's the main symptom you're concerned about?" (e g , redness, pain, drainage)      Numbness and some "pins and needles"   2  ONSET: "When did symptoms   start?"      About 3 hours after the procedure   3  SURGERY: "What surgery was performed?"      Removal of mass of left pointer finger   4  DATE of SURGERY: "When was surgery performed?"       4/28/21  5  INCISION SITE: "Where is the incision located?"       Left pointer    6  REDNESS: "Is there any redness at the incision site?" If yes, ask: "How wide across is the redness?" (Inches, centimeters)       Finger is wrapped with bandage  7  PAIN: "Is there any pain?" If so, ask: "How bad is it?"  (Scale 1-10; or mild, moderate, severe)      Some mild pain, 2 out of 10    8  BLEEDING: "Is there any bleeding?" If so, ask: "How much?" and "Where?"      No   9  DRAINAGE: "Is there any drainage from the incision site?" If yes, ask: "What color and how much?" (e g , red, cloudy, pus; drops, teaspoon)      No   10  FEVER: "Do you have a fever?" If so, ask: "What is your temperature, how was it measured, and when did it start?"        No   11   OTHER SYMPTOMS: "Do you have any other symptoms?" (e g , shaking chills, weakness, rash elsewhere on body)        No    Protocols used: POST-OP INCISION SYMPTOMS AND QUESTIONS-ADULT-AH

## 2021-04-28 NOTE — PROGRESS NOTES
[facility] Follow Up Note    Patient Name: Diana Becerra  Encounter Date: 04/28/2021    Today's Chief Concerns:  Lucia Aragon Concern #1:  Mass of finger       Current Medications:    Current Outpatient Medications:     cholecalciferol (VITAMIN D3) 1,000 units tablet, Take 1,000 Units by mouth daily, Disp: , Rfl:     Cholecalciferol (VITAMIN D3) 04822 units CAPS, Take by mouth once a week, Disp: , Rfl:     diclofenac sodium (VOLTAREN) 1 %, Apply 4 g topically 4 (four) times a day (Patient taking differently: Apply 4 g topically 4 (four) times a day ), Disp: 4 Tube, Rfl: 1    doxazosin (CARDURA) 4 mg tablet, Take 4 mg by mouth daily at bedtime, Disp: , Rfl:     tamsulosin (FLOMAX) 0 4 mg, Take 0 4 mg by mouth daily with dinner, Disp: , Rfl:     meloxicam (MOBIC) 7 5 mg tablet, Take 1 tablet (7 5 mg total) by mouth daily for 10 days (Patient not taking: Reported on 11/20/2019), Disp: 10 tablet, Rfl: 0    CONSTITUTIONAL:   Vitals:    04/28/21 1330   Weight: 111 kg (244 lb 6 4 oz)   Height: 5' 10 5" (1 791 m)       Specific Alerts:    Have you been seen by a [facility] in the last 3 years? YES    Are you pregnant or planning to become pregnant? No    Are you currently or planning to be nursing or breast feeding? No    No Known Allergies    May we call your Preferred Phone number to discuss your specific medical information? YES    May we leave a detailed message that includes your specific medical information? YES    Have you traveled outside of the [facility] in the past 3 months? No    Do you currently have a pacemaker or defibrillator? No    Do you have any artificial heart valves, joints, plates, screws, rods, stents, pins, etc? No   - If Yes, were any placed within the last 2 years? Do you require any medications prior to a surgical procedure?  No      Are you taking any medications that cause you to bleed more easily ("blood thinners") No    Have you ever experienced a rapid heartbeat with epinephrine? No    Have you ever been treated with "gold" (gold sodium thiomalate) therapy? No    [facility] can help with wrinkles, "laugh lines," facial volume loss, "double chin," "love handles," age spots, and more  Are you interested in learning today about some of the skin enhancement procedures that we offer? (If Yes, please provide more detail) No    Review of Systems:  Have you recently had or currently have any of the following?     · Fever or chills: No  · Night Sweats: No  · Headaches: No  · Weight Gain: No  · Weight Loss: No  · Blurry Vision: No  · Nausea: No  · Vomiting: No  · Diarrhea: No  · Blood in Stool: No  · Abdominal Pain: No  · Itchy Skin: No  · Painful Joints: YES  · Swollen Joints: No  · Muscle Pain: No  · Irregular Mole: No  · Sun Burn: No  · Dry Skin: No  · Skin Color Changes: No  · Scar or Keloid: No  · Cold Sores/Fever Blisters: No  · Bacterial Infections/MRSA: No  · Anxiety: No  · Depression: No  · Suicidal or Homicidal Thoughts: No      PSYCH: Normal mood and affect  EYES: Normal conjunctiva  ENT: Normal lips and oral mucosa  CARDIOVASCULAR: No edema  RESPIRATORY: Normal respirations  HEME/LYMPH/IMMUNO:  No regional lymphadenopathy except as noted below in ASSESSMENT AND PLAN BY DIAGNOSIS    FULL ORGAN SYSTEM SKIN EXAM (SKIN)   Hair, Scalp, Ears, Face Normal except as noted below in Assessment   Neck, Normal except as noted below in Assessment   Right Arm/Hand/Fingers Normal except as noted below in Assessment                                 1  FOLLOW-UP:EDDA  Physical Exam:  · Anatomic Location Affected:  Left 2nd digit  · Morphological Description:  1 cm pink lichenified plaque circular ulcer with clean base         Additional History of Present Condition:  Patient states blisted with last treatment, he has been soaking and treating with peroxide and neosporin     Assessment and Plan:  Based on a thorough discussion of this condition and the management approach to it (including a comprehensive discussion of the known risks, side effects and potential benefits of treatment), the patient (family) agrees to implement the following specific plan:  · Can purchase wound gel Over the Counter for healing or use script for medihoney  · Follow up 4 weeks if not healed           Scribe Attestation    I,:  Analisa Merza am acting as a scribe while in the presence of the attending physician :       I,:  Owen Almeida MD personally performed the services described in this documentation    as scribed in my presence :

## 2021-04-28 NOTE — PATIENT INSTRUCTIONS
1   FOLLOW-UP:JAMEL         Assessment and Plan:  Based on a thorough discussion of this condition and the management approach to it (including a comprehensive discussion of the known risks, side effects and potential benefits of treatment), the patient (family) agrees to implement the following specific plan:  · Can purchase wound gel Over the Counter for healing   · Follow up 4 weeks

## 2021-04-28 NOTE — TELEPHONE ENCOUNTER
Called patient and discussed  Unclear per note, but after review of note and after speaking with patient, he seems to have had a knuckle pad pared down at his appointment  It was bandaged per him with tape and gauze and the bandage does not go all the way around the finger  Re-assured, discussed main concern would be to make sure bandage is not overly tight  Instructed to replace bandage  Discussed care of area daily including washing with soap and water and otherwise directed to follow planned care with [facility] as discussed at visit  Dr Shelby Hines advised of pt call as well given he is her pt

## 2021-04-28 NOTE — TELEPHONE ENCOUNTER
Regarding: Had mass removed from finger/ Feels numb and tingly   ----- Message from Allen Delgado sent at 4/28/2021  6:19 PM EDT -----  "I had a mass removal of my left pointer finger this morning and its starting to feel numb and tingly "

## 2021-04-28 NOTE — TELEPHONE ENCOUNTER
Dr Yumiko Nesbitt on call was made aware of patient's symptoms  Dr Yumiko Nesbitt confirmed that she would call the patient

## 2021-04-29 ENCOUNTER — TELEPHONE (OUTPATIENT)
Dept: DERMATOLOGY | Facility: CLINIC | Age: 56
End: 2021-04-29

## 2021-04-29 NOTE — TELEPHONE ENCOUNTER
Patient medihoney needs a prior authorization  I have started the prior athorization through cover my meds  Authorization is currently in process  The patient should start wound gel over the counter while waiting for response  I did try to the call the patient on his home phone and the phone kept ringing and there was no voicemail  I will try again later

## 2021-05-07 NOTE — TELEPHONE ENCOUNTER
Spoke with Leanne Garcia from [facility] for an update on prior authorization and the medication is not covered and will not be paid for even with a prior authorization

## 2021-05-07 NOTE — TELEPHONE ENCOUNTER
I have tried to call the patient to inform him of his prior authorization determination  The patient's phone does not have a voicemail box set up and I am unable to leave a voicemail, I will try again

## 2021-05-10 NOTE — TELEPHONE ENCOUNTER
I have tried to call the patient a second time and I was unable to leave a voicemail  I will try again later

## 2021-05-12 NOTE — TELEPHONE ENCOUNTER
OK, the responsibility is on him at this point to call back if he is having a problem    You don't need to call again

## 2021-05-26 ENCOUNTER — OFFICE VISIT (OUTPATIENT)
Dept: DERMATOLOGY | Age: 56
End: 2021-05-26
Payer: COMMERCIAL

## 2021-05-26 ENCOUNTER — OFFICE VISIT (OUTPATIENT)
Dept: OTOLARYNGOLOGY | Facility: CLINIC | Age: 56
End: 2021-05-26
Payer: COMMERCIAL

## 2021-05-26 VITALS — BODY MASS INDEX: 35.7 KG/M2 | TEMPERATURE: 97.2 F | WEIGHT: 255 LBS | HEIGHT: 71 IN

## 2021-05-26 VITALS — WEIGHT: 262.2 LBS | HEIGHT: 71 IN | BODY MASS INDEX: 36.71 KG/M2 | TEMPERATURE: 98.6 F

## 2021-05-26 DIAGNOSIS — H61.23 BILATERAL IMPACTED CERUMEN: Primary | ICD-10-CM

## 2021-05-26 DIAGNOSIS — R22.32 MASS OF FINGER OF LEFT HAND: Primary | ICD-10-CM

## 2021-05-26 PROCEDURE — 99213 OFFICE O/P EST LOW 20 MIN: CPT | Performed by: DERMATOLOGY

## 2021-05-26 PROCEDURE — 69210 REMOVE IMPACTED EAR WAX UNI: CPT | Performed by: NURSE PRACTITIONER

## 2021-05-26 PROCEDURE — 99243 OFF/OP CNSLTJ NEW/EST LOW 30: CPT | Performed by: NURSE PRACTITIONER

## 2021-05-26 RX ORDER — ATORVASTATIN CALCIUM 10 MG/1
TABLET, FILM COATED ORAL
COMMUNITY
Start: 2021-04-27

## 2021-05-26 RX ORDER — CYCLOBENZAPRINE HCL 10 MG
10 TABLET ORAL AS NEEDED
COMMUNITY
Start: 2021-05-12

## 2021-05-26 RX ORDER — TRAMADOL HYDROCHLORIDE 50 MG/1
TABLET ORAL
COMMUNITY
Start: 2021-05-19

## 2021-05-26 RX ORDER — FLURANDRENOLIDE 4 UG/CM2
1 TAPE TOPICAL 2 TIMES DAILY
Qty: 60 EACH | Refills: 1 | Status: SHIPPED | OUTPATIENT
Start: 2021-05-26

## 2021-05-26 NOTE — PATIENT INSTRUCTIONS
FOLLOW-UP: KNROSILEPAD    Assessment and Plan:  Based on a thorough discussion of this condition and the management approach to it (including a comprehensive discussion of the known risks, side effects and potential benefits of treatment), the patient (family) agrees to implement the following specific plan:     Recommended no treatment today with it being in the healing process   Discussed using Vaseline daily to area    Discussed using Cordran tape after healed, coupon provided  Place tape on twice daily for 2 months      Discussed laser treatment and chemical peels if continues not to heal    Follow up in 2 months

## 2021-05-26 NOTE — PROGRESS NOTES
[facility] Follow Up Note    Patient Name: Nuria Carpenter  Encounter Date: 05/26/2021    Today's Chief Concerns:  Raf Cyr Concern #1:  Kimberley Tavares     Current Medications:    Current Outpatient Medications:     atorvastatin (LIPITOR) 10 mg tablet, , Disp: , Rfl:     cholecalciferol (VITAMIN D3) 1,000 units tablet, Take 1,000 Units by mouth daily, Disp: , Rfl:     Cholecalciferol (VITAMIN D3) 84192 units CAPS, Take by mouth once a week, Disp: , Rfl:     doxazosin (CARDURA) 4 mg tablet, Take 4 mg by mouth daily at bedtime, Disp: , Rfl:     tamsulosin (FLOMAX) 0 4 mg, Take 0 4 mg by mouth daily with dinner, Disp: , Rfl:     diclofenac sodium (VOLTAREN) 1 %, Apply 4 g topically 4 (four) times a day (Patient not taking: Reported on 5/26/2021), Disp: 4 Tube, Rfl: 1    meloxicam (MOBIC) 7 5 mg tablet, Take 1 tablet (7 5 mg total) by mouth daily for 10 days (Patient not taking: Reported on 11/20/2019), Disp: 10 tablet, Rfl: 0    Wound Dressings (Medihoney Wound/Burn Dressing) GEL, Apply 1 application topically daily (Patient not taking: Reported on 5/26/2021), Disp: 15 mL, Rfl: 1    CONSTITUTIONAL:   Vitals:    05/26/21 1004   Temp: 98 6 °F (37 °C)   TempSrc: Tympanic   Weight: 119 kg (262 lb 3 2 oz)   Height: 5' 10 5" (1 791 m)       Specific Alerts:    Have you been seen by a [facility]atologist in the last 3 years? YES    No Known Allergies    May we call your Preferred Phone number to discuss your specific medical information? YES    May we leave a detailed message that includes your specific medical information? YES    Have you traveled outside of the [facility] in the past 3 months? No    Do you currently have a pacemaker or defibrillator? No    Do you have any artificial heart valves, joints, plates, screws, rods, stents, pins, etc? No   - If Yes, were any placed within the last 2 years? Do you require any medications prior to a surgical procedure?  No     Are you taking any medications that cause you to bleed more easily ("blood thinners") No    Have you ever experienced a rapid heartbeat with epinephrine? No    Have you ever been treated with "gold" (gold sodium thiomalate) therapy? No    [facility] can help with wrinkles, "laugh lines," facial volume loss, "double chin," "love handles," age spots, and more  Are you interested in learning today about some of the skin enhancement procedures that we offer? (If Yes, please provide more detail) No    Review of Systems:  Have you recently had or currently have any of the following? · Fever or chills: No  · Night Sweats: No  · Headaches: No  · Weight Gain: No  · Weight Loss: No  · Blurry Vision: No  · Nausea: No  · Vomiting: No  · Diarrhea: No  · Blood in Stool: No  · Abdominal Pain: No  · Itchy Skin: No  · Painful Joints: No  · Swollen Joints: No  · Muscle Pain: No  · Irregular Mole: No  · Sun Burn: No  · Dry Skin: No  · Skin Color Changes: No  · Scar or Keloid: No  · Cold Sores/Fever Blisters: No  · Bacterial Infections/MRSA: No  · Anxiety: No  · Depression: No  · Suicidal or Homicidal Thoughts: No      PSYCH: Normal mood and affect  EYES: Normal conjunctiva  ENT: Normal lips and oral mucosa  CARDIOVASCULAR: No edema  RESPIRATORY: Normal respirations  HEME/LYMPH/IMMUNO:  No regional lymphadenopathy except as noted below in ASSESSMENT AND PLAN BY DIAGNOSIS    FULL ORGAN SYSTEM SKIN EXAM (SKIN)   Left Fingers Normal except as noted below in Assessment       FOLLOW-UP: KNUCKLEPAD    Physical Exam:   Anatomic Location Affected:  Left 1st digit   Morphological Description:  Hyperkeratotic plaque with central crust    Pertinent Positives:  Pertinent Negatives: Additional History of Present Condition:  Prior treatment of kenalog and cryotherapy    Patient states both might of been took much since blistering and not wanting to heal      Assessment and Plan:  Based on a thorough discussion of this condition and the management approach to it (including a comprehensive discussion of the known risks, side effects and potential benefits of treatment), the patient (family) agrees to implement the following specific plan:     Recommended no treatment today with it being in the healing process   Discussed using Vaseline daily to area    Discussed using Cordran tape after healed, coupon provided  Place tape on twice daily for 2 months      Discussed laser treatment and chemical peels if continues not to heal    Follow up in 2 months       Scribe Attestation    I,:  Domingo Sheets am acting as a scribe while in the presence of the attending physician :       I,:  Edi Miranda MD personally performed the services described in this documentation    as scribed in my presence :

## 2021-05-26 NOTE — PROGRESS NOTES
Assessment/Plan:    Bilateral impacted cerumen    On exam noted bilateral cerumen impaction and unable to fully view tympanic membrane  Cerumen impaction removed bilateral eac with irrigation, alligator forceps and suction, pt tolerated procedure well  Upon removal, improved hearing and decreased clogged sensation of bilateral ears  Discussed routine cerumen care including avoidance of q-tips and cerumen softeners  Encourage ongoing follow up 6 months to one year to monitor for cerumen and hearing  Audiogram if symptoms worsen  Diagnoses and all orders for this visit:    Bilateral impacted cerumen  -     Ear cerumen removal          Subjective:      Patient ID: Janice Watson is a 63 y o  male  Presents today as a new patient due to bilateral blocked ears  Both ears clogged with wax often  Prior ear cleaning with Dr Wash Banco   No pain  No otorrhea  No current hearing aids  No prior ear surgery  No other ENT concerns        The following portions of the patient's history were reviewed and updated as appropriate: allergies, current medications, past family history, past medical history, past social history, past surgical history and problem list     Review of Systems   Constitutional: Negative  HENT: Positive for hearing loss  Negative for congestion, ear discharge, ear pain, nosebleeds, postnasal drip, rhinorrhea, sinus pressure, sinus pain, sore throat, tinnitus and voice change  Eyes: Negative  Respiratory: Negative for chest tightness and shortness of breath  Cardiovascular: Negative  Gastrointestinal: Negative  Endocrine: Negative  Musculoskeletal: Negative  Skin: Negative for color change  Neurological: Negative for dizziness, numbness and headaches  Psychiatric/Behavioral: Negative            Objective:      Temp (!) 97 2 °F (36 2 °C) (Temporal)   Ht 5' 10 5" (1 791 m)   Wt 116 kg (255 lb)   BMI 36 07 kg/m²          Physical Exam  Constitutional: Appearance: He is well-developed  HENT:      Head: Normocephalic  Right Ear: Hearing, tympanic membrane, ear canal and external ear normal  No decreased hearing noted  No drainage or tenderness  There is impacted cerumen  Tympanic membrane is not perforated or erythematous  Left Ear: Hearing, tympanic membrane, ear canal and external ear normal  No decreased hearing noted  No drainage or tenderness  There is impacted cerumen  Tympanic membrane is not perforated or erythematous  Nose: Nose normal  No nasal deformity or septal deviation  Mouth/Throat:      Mouth: Mucous membranes are not pale and not dry  No oral lesions  Dentition: Normal dentition  Pharynx: Uvula midline  No oropharyngeal exudate  Neck:      Musculoskeletal: Full passive range of motion without pain, normal range of motion and neck supple  Trachea: No tracheal deviation  Cardiovascular:      Rate and Rhythm: Normal rate  Pulmonary:      Effort: Pulmonary effort is normal  No accessory muscle usage or respiratory distress  Musculoskeletal:      Right shoulder: He exhibits normal range of motion  Lymphadenopathy:      Cervical: No cervical adenopathy  Skin:     General: Skin is warm and dry  Neurological:      Mental Status: He is alert and oriented to person, place, and time  Cranial Nerves: No cranial nerve deficit  Sensory: No sensory deficit  Psychiatric:         Behavior: Behavior is cooperative  Ear cerumen removal    Date/Time: 5/26/2021 2:45 PM  Performed by: SOFIA Rivas  Authorized by: SOFIA Rivas   Universal Protocol:  Consent: Verbal consent obtained    Risks and benefits: risks, benefits and alternatives were discussed  Consent given by: patient  Patient understanding: patient states understanding of the procedure being performed      Patient location:  Clinic  Procedure details:     Local anesthetic:  None    Location:  L ear and R ear    Approach: External  Post-procedure details:     Complication:  None    Hearing quality:  Normal    Patient tolerance of procedure:   Tolerated well, no immediate complications  Comments:      Bilateral cerumen impaction, removed on right with suction #5, left removed with irrigation, suction #5 and 7

## 2021-05-29 NOTE — ASSESSMENT & PLAN NOTE
On exam noted bilateral cerumen impaction and unable to fully view tympanic membrane  Cerumen impaction removed bilateral eac with irrigation, alligator forceps and suction, pt tolerated procedure well  Upon removal, improved hearing and decreased clogged sensation of bilateral ears  Discussed routine cerumen care including avoidance of q-tips and cerumen softeners  Encourage ongoing follow up 6 months to one year to monitor for cerumen and hearing  Audiogram if symptoms worsen

## 2021-06-02 ENCOUNTER — TELEPHONE (OUTPATIENT)
Dept: DERMATOLOGY | Facility: CLINIC | Age: 56
End: 2021-06-02

## 2021-06-02 NOTE — TELEPHONE ENCOUNTER
Spoke to patient per Dr Luiz Lopez request to confirm medication was purchased with coupon  Patient did use coupon and out of pocket cost of $150 was paid to purchase  Not seeing results as of yet but states just started using  Reminded patient of upcoming follow up and to call or mychart questions or concerns

## 2021-06-10 ENCOUNTER — OFFICE VISIT (OUTPATIENT)
Dept: PODIATRY | Facility: CLINIC | Age: 56
End: 2021-06-10
Payer: COMMERCIAL

## 2021-06-10 VITALS — WEIGHT: 255 LBS | BODY MASS INDEX: 35.7 KG/M2 | RESPIRATION RATE: 17 BRPM | HEIGHT: 71 IN

## 2021-06-10 DIAGNOSIS — M79.671 PAIN IN BOTH FEET: Primary | ICD-10-CM

## 2021-06-10 DIAGNOSIS — M72.2 PLANTAR FASCIITIS: ICD-10-CM

## 2021-06-10 DIAGNOSIS — M20.42 HAMMER TOES OF BOTH FEET: ICD-10-CM

## 2021-06-10 DIAGNOSIS — B35.3 TINEA PEDIS OF BOTH FEET: ICD-10-CM

## 2021-06-10 DIAGNOSIS — B35.1 ONYCHOMYCOSIS: ICD-10-CM

## 2021-06-10 DIAGNOSIS — M20.41 HAMMER TOE OF RIGHT FOOT: ICD-10-CM

## 2021-06-10 DIAGNOSIS — L60.0 INGROWN TOENAIL: ICD-10-CM

## 2021-06-10 DIAGNOSIS — M79.672 PAIN IN BOTH FEET: Primary | ICD-10-CM

## 2021-06-10 DIAGNOSIS — M20.41 HAMMER TOES OF BOTH FEET: ICD-10-CM

## 2021-06-10 PROCEDURE — 99212 OFFICE O/P EST SF 10 MIN: CPT | Performed by: PODIATRIST

## 2021-06-25 ENCOUNTER — OFFICE VISIT (OUTPATIENT)
Dept: OBGYN CLINIC | Facility: CLINIC | Age: 56
End: 2021-06-25
Payer: COMMERCIAL

## 2021-06-25 ENCOUNTER — APPOINTMENT (OUTPATIENT)
Dept: RADIOLOGY | Facility: CLINIC | Age: 56
End: 2021-06-25
Payer: COMMERCIAL

## 2021-06-25 VITALS
WEIGHT: 260 LBS | HEART RATE: 98 BPM | SYSTOLIC BLOOD PRESSURE: 102 MMHG | HEIGHT: 70 IN | DIASTOLIC BLOOD PRESSURE: 66 MMHG | BODY MASS INDEX: 37.22 KG/M2

## 2021-06-25 DIAGNOSIS — M54.50 LOW BACK PAIN, UNSPECIFIED BACK PAIN LATERALITY, UNSPECIFIED CHRONICITY, UNSPECIFIED WHETHER SCIATICA PRESENT: Primary | ICD-10-CM

## 2021-06-25 DIAGNOSIS — M54.50 LOW BACK PAIN, UNSPECIFIED BACK PAIN LATERALITY, UNSPECIFIED CHRONICITY, UNSPECIFIED WHETHER SCIATICA PRESENT: ICD-10-CM

## 2021-06-25 DIAGNOSIS — M48.061 SPINAL STENOSIS OF LUMBAR REGION WITHOUT NEUROGENIC CLAUDICATION: ICD-10-CM

## 2021-06-25 PROCEDURE — 72100 X-RAY EXAM L-S SPINE 2/3 VWS: CPT

## 2021-06-25 PROCEDURE — 99214 OFFICE O/P EST MOD 30 MIN: CPT | Performed by: ORTHOPAEDIC SURGERY

## 2021-06-25 RX ORDER — HYDROCODONE BITARTRATE AND ACETAMINOPHEN 5; 325 MG/1; MG/1
TABLET ORAL AS NEEDED
COMMUNITY
Start: 2021-06-07

## 2021-06-25 NOTE — LETTER
June 25, 2021     Celine Farah MD  [address]  [address] [facility]    Patient: Lou Roca   YOB: 1965   Date of Visit: 6/25/2021       Dear Dr John Oleary:    Thank you for referring Pradeep Ashby to me for evaluation  Below are the relevant portions of my assessment and plan of care  Jose's evaluation today is concerning  He does have weakness in the left lower extremity as well as sensation changes in L3-L4 dermatomes  His previous MRI did show significant degenerative disease throughout the lumbar spine  This was back in 2016  I would like him to have a new MRI of the lumbar spine to further evaluate the degenerative progression  I am suspicious of lumbar spinal stenosis  Once he has had the MRI completed I would like him to follow up with Dr Lise Arrieta of pain management  He may consider providing Sahara Rumple injections  Sahara Rumple may need surgical intervention to find relief in the form of laminectomy  He can continue to work as tolerated  If you have questions, please do not hesitate to call me  I look forward to following Sahara Elam along with you           Sincerely,        Marsha Hatch MD        CC: No Recipients

## 2021-06-25 NOTE — PROGRESS NOTES
Assessment/Plan:  1  Low back pain, unspecified back pain laterality, unspecified chronicity, unspecified whether sciatica present  XR spine lumbar 2 or 3 views injury   2  Spinal stenosis of lumbar region without neurogenic claudication  MRI lumbar spine wo contrast    Ambulatory referral to Pain Management       Scribe Attestation    I,:  Jenni Lay am acting as a scribe while in the presence of the attending physician :       I,:  Marsha Hatch MD personally performed the services described in this documentation    as scribed in my presence :             Jose's evaluation today is concerning  He does have weakness in the left lower extremity as well as sensation changes in L3-L4 dermatomes  His previous MRI did show significant degenerative disease throughout the lumbar spine  This was back in 2016  I would like him to have a new MRI of the lumbar spine to further evaluate the degenerative progression  I am suspicious of lumbar spinal stenosis  Once he has had the MRI completed I would like him to follow up with Dr iLse Arrieta of pain management  He may consider providing Sahara Rumple injections  Sahara Rumple may need surgical intervention to find relief in the form of laminectomy  He can continue to work as tolerated  Subjective:   Lou Roca is a 63 y o  male who presents to the office today for back pain  He was referred to us by Dr Merlin Cervantes has a chronic history of low back pain  He had an MRI in 2016 that demonstrated degenerative changes at multiple levels in the lumbar spine  Unfortunately for Sahara Rumple his pain persist   He states his back is in constant pain  He considers it a moderate throbbing sensation throughout the lower back more so on the left side  While he is at work if he lifts anything of weight it causes back pain to worsen  His pain can be sharp and severe  He states a normal day his pain is considered an 8/10 and on bad days it can be greater than 10    He states his most recent bad day was 2 weeks ago  Dr Jones prescribed him Vicodin  Healing found it necessary to take 2 out of the 10 pills but they did seem to help  He has had physical therapy back in 2016 and states he continues with his home exercises provided by his therapist but he does not feel this is helping any longer  He states he has numbness about the left lateral thigh daily  He is now noticing a tingling type sensation in the right lateral thigh after is at work for a few hours  This typically will resolve with rest   He states on bad days, which typically are followed by a strenuous day at work, he is unable to get out of bed the next morning  He has been evaluated in the ED for this because his pain has been so severe  Review of Systems   Constitutional: Negative for chills, fever and unexpected weight change  HENT: Negative for hearing loss, nosebleeds and sore throat  Eyes: Negative for pain, redness and visual disturbance  Respiratory: Negative for cough, shortness of breath and wheezing  Cardiovascular: Negative for chest pain, palpitations and leg swelling  Gastrointestinal: Negative for abdominal pain, nausea and vomiting  Endocrine: Negative for polyphagia and polyuria  Genitourinary: Negative for dysuria and hematuria  Musculoskeletal:        See HPI   Skin: Negative for rash and wound  Neurological: Negative for dizziness, numbness and headaches  Psychiatric/Behavioral: Negative for decreased concentration and suicidal ideas  The patient is not nervous/anxious  Past Medical History:   Diagnosis Date    BPH (benign prostatic hyperplasia)     Hypertension        Past Surgical History:   Procedure Laterality Date    COLONOSCOPY N/A 10/18/2017    Procedure: COLONOSCOPY;  Surgeon: Malini Davis MD;  Location: [facility] GI [facility];   Service: Gastroenterology    HEMORRHOID SURGERY         Family History   Problem Relation Age of Onset    Heart disease Mother    [employer] Cancer Father         stomach    Hypertension Father     Stomach cancer Father     Colon cancer Maternal Aunt        Social History     Occupational History    Not on file   Tobacco Use    Smoking status: Never Smoker    Smokeless tobacco: Never Used   Vaping Use    Vaping Use: Never used   Substance and Sexual Activity    Alcohol use: Yes     Comment: occasionally     Drug use: No    Sexual activity: Not on file         Current Outpatient Medications:     atorvastatin (LIPITOR) 10 mg tablet, , Disp: , Rfl:     cholecalciferol (VITAMIN D3) 1,000 units tablet, Take 1,000 Units by mouth daily, Disp: , Rfl:     Cholecalciferol (VITAMIN D3) 89647 units CAPS, Take by mouth once a week, Disp: , Rfl:     cyclobenzaprine (FLEXERIL) 10 mg tablet, 10 mg as needed , Disp: , Rfl:     doxazosin (CARDURA) 4 mg tablet, Take 4 mg by mouth daily at bedtime, Disp: , Rfl:     Flurandrenolide (Cordran) 4 MCG/SQCM TAPE, Apply 1 each topically 2 (two) times a day, Disp: 60 each, Rfl: 1    HYDROcodone-acetaminophen (NORCO) 5-325 mg per tablet, as needed, Disp: , Rfl:     tamsulosin (FLOMAX) 0 4 mg, Take 0 4 mg by mouth daily with dinner, Disp: , Rfl:     traMADol (ULTRAM) 50 mg tablet, , Disp: , Rfl:     Wound Dressings (Medihoney Wound/Burn Dressing) GEL, , Disp: , Rfl:     diclofenac sodium (VOLTAREN) 1 %, Apply 4 g topically 4 (four) times a day (Patient not taking: Reported on 5/26/2021), Disp: 4 Tube, Rfl: 1    meloxicam (MOBIC) 7 5 mg tablet, Take 1 tablet (7 5 mg total) by mouth daily for 10 days (Patient not taking: Reported on 11/20/2019), Disp: 10 tablet, Rfl: 0    Wound Dressings (Medihoney Wound/Burn Dressing) GEL, Apply 1 application topically daily (Patient not taking: Reported on 5/26/2021), Disp: 15 mL, Rfl: 1    No Known Allergies    Objective:  Vitals:    06/25/21 1520   BP: 102/66   Pulse: 98       Back Exam     Tenderness   The patient is experiencing tenderness in the sacroiliac (Left sacral iliac)  Range of Motion   Extension: 20 (Painful)   Flexion: 50 (Painful)   Lateral bend right: 20 (Pain left side)   Lateral bend left: 20   Rotation right: 40 (Pain left side)   Rotation left: 40     Muscle Strength   Back normal muscle strength: 5/5 strength in the tibialis anterior, EHL bilaterally  Right Quadriceps:  5/5   Left Quadriceps:  4/5   Right Hamstrings:  5/5   Left Hamstrings:  4/5     Tests   Straight leg raise right: positive  Straight leg raise left: positive    Reflexes   Patellar: normal    Other   Sensation: decreased (Left lateral thigh)            Physical Exam  Vitals reviewed  Constitutional:       Appearance: He is well-developed  HENT:      Head: Normocephalic and atraumatic  Eyes:      General:         Right eye: No discharge  Left eye: No discharge  Conjunctiva/sclera: Conjunctivae normal    Cardiovascular:      Rate and Rhythm: Regular rhythm  Pulmonary:      Effort: Pulmonary effort is normal  No respiratory distress  Musculoskeletal:      Cervical back: Normal range of motion and neck supple  Lumbar back: Positive right straight leg raise test and positive left straight leg raise test    Skin:     General: Skin is warm and dry  Neurological:      Mental Status: He is alert and oriented to person, place, and time     Psychiatric:         Behavior: Behavior normal

## 2021-07-21 ENCOUNTER — OFFICE VISIT (OUTPATIENT)
Dept: DERMATOLOGY | Age: 56
End: 2021-07-21
Payer: COMMERCIAL

## 2021-07-21 VITALS — BODY MASS INDEX: 37.22 KG/M2 | HEIGHT: 70 IN | WEIGHT: 260 LBS | TEMPERATURE: 98.9 F

## 2021-07-21 DIAGNOSIS — L01.00 IMPETIGO: ICD-10-CM

## 2021-07-21 DIAGNOSIS — R22.32 MASS OF FINGER OF LEFT HAND: Primary | ICD-10-CM

## 2021-07-21 PROCEDURE — 99214 OFFICE O/P EST MOD 30 MIN: CPT | Performed by: DERMATOLOGY

## 2021-07-21 RX ORDER — KETOCONAZOLE 20 MG/G
CREAM TOPICAL DAILY
Qty: 15 G | Refills: 0 | Status: SHIPPED | OUTPATIENT
Start: 2021-07-21 | End: 2021-10-07

## 2021-07-21 RX ORDER — HYDROXYZINE HYDROCHLORIDE 25 MG/1
25 TABLET, FILM COATED ORAL EVERY 6 HOURS PRN
Qty: 30 TABLET | Refills: 0 | Status: SHIPPED | OUTPATIENT
Start: 2021-07-21

## 2021-07-21 NOTE — PROGRESS NOTES
[facility] Follow Up Note    Patient Name: Christal Seo  Encounter Date: 07/21/2021    Today's Chief Concerns:  Luiz Garcia Concern #1:  Knuckle Pads    Concern #2:  Spot of concern on chin       Current Medications:    Current Outpatient Medications:     atorvastatin (LIPITOR) 10 mg tablet, , Disp: , Rfl:     cholecalciferol (VITAMIN D3) 1,000 units tablet, Take 1,000 Units by mouth daily, Disp: , Rfl:     Cholecalciferol (VITAMIN D3) 51210 units CAPS, Take by mouth once a week, Disp: , Rfl:     cyclobenzaprine (FLEXERIL) 10 mg tablet, 10 mg as needed , Disp: , Rfl:     doxazosin (CARDURA) 4 mg tablet, Take 4 mg by mouth daily at bedtime, Disp: , Rfl:     Flurandrenolide (Cordran) 4 MCG/SQCM TAPE, Apply 1 each topically 2 (two) times a day, Disp: 60 each, Rfl: 1    HYDROcodone-acetaminophen (NORCO) 5-325 mg per tablet, as needed, Disp: , Rfl:     tamsulosin (FLOMAX) 0 4 mg, Take 0 4 mg by mouth daily with dinner, Disp: , Rfl:     traMADol (ULTRAM) 50 mg tablet, , Disp: , Rfl:     Wound Dressings (Medihoney Wound/Burn Dressing) GEL, , Disp: , Rfl:     diclofenac sodium (VOLTAREN) 1 %, Apply 4 g topically 4 (four) times a day (Patient not taking: Reported on 5/26/2021), Disp: 4 Tube, Rfl: 1    meloxicam (MOBIC) 7 5 mg tablet, Take 1 tablet (7 5 mg total) by mouth daily for 10 days (Patient not taking: Reported on 11/20/2019), Disp: 10 tablet, Rfl: 0    Wound Dressings (Medihoney Wound/Burn Dressing) GEL, Apply 1 application topically daily (Patient not taking: Reported on 5/26/2021), Disp: 15 mL, Rfl: 1    CONSTITUTIONAL:   Vitals:    07/21/21 1335   Temp: 98 9 °F (37 2 °C)   TempSrc: Tympanic   Weight: 118 kg (260 lb)   Height: 5' 10" (1 778 m)         Specific Alerts:    Have you been seen by a [facility] in the last 3 years? YES    Are you pregnant or planning to become pregnant? No    Are you currently or planning to be nursing or breast feeding?  No    No Known Allergies    May we call your Preferred Phone number to discuss your specific medical information? YES    May we leave a detailed message that includes your specific medical information? YES    Have you traveled outside of the [facility] in the past 3 months? No    Do you currently have a pacemaker or defibrillator? No    Do you have any artificial heart valves, joints, plates, screws, rods, stents, pins, etc? No   - If Yes, were any placed within the last 2 years? Do you require any medications prior to a surgical procedure? No    Are you taking any medications that cause you to bleed more easily ("blood thinners") No    Have you ever experienced a rapid heartbeat with epinephrine? No    Have you ever been treated with "gold" (gold sodium thiomalate) therapy? No    [address] [facility] can help with wrinkles, "laugh lines," facial volume loss, "double chin," "love handles," age spots, and more  Are you interested in learning today about some of the skin enhancement procedures that we offer? (If Yes, please provide more detail) No    Review of Systems:  Have you recently had or currently have any of the following?     · Fever or chills: No  · Night Sweats: No  · Headaches: No  · Weight Gain: No  · Weight Loss: No  · Blurry Vision: No  · Nausea: No  · Vomiting: No  · Diarrhea: No  · Blood in Stool: No  · Abdominal Pain: No  · Itchy Skin: No  · Painful Joints: No  · Swollen Joints: No  · Muscle Pain: No  · Irregular Mole: No  · Sun Burn: No  · Dry Skin: YES  · Skin Color Changes: No  · Scar or Keloid: No  · Cold Sores/Fever Blisters: No  · Bacterial Infections/MRSA: No  · Anxiety: No  · Depression: No  · Suicidal or Homicidal Thoughts: No      PSYCH: Normal mood and affect  EYES: Normal conjunctiva  ENT: Normal lips and oral mucosa  CARDIOVASCULAR: No edema  RESPIRATORY: Normal respirations  HEME/LYMPH/IMMUNO:  No regional lymphadenopathy except as noted below in ASSESSMENT AND PLAN BY DIAGNOSIS    FULL ORGAN SYSTEM SKIN EXAM (SKIN)   Hair, Scalp, Ears, Face Normal except as noted below in Assessment   Neck, Cervical Chain Nodes Normal except as noted below in Assessment   Right Arm/Hand/Fingers Normal except as noted below in Assessment   Left Arm/Hand/Fingers Normal except as noted below in Assessment   Chest/Breasts/Axillae Viewed areas Normal except as noted below in Assessment   Abdomen, Umbilicus Normal except as noted below in Assessment   Back/Spine Normal except as noted below in Assessment   Groin/Genitalia/Buttocks Viewed areas Normal except as noted below in Assessment   Right Leg, Foot, Toes Normal except as noted below in Assessment   Left Leg, Foot, Toes Normal except as noted below in Assessment       1  FOLLOW-UP: KNUCKLEPAD     Physical Exam:  · Anatomic Location Affected:  Left 1st digit  · Morphological Description:  Hyperkeratotic plaque with central crust   · Pertinent Positives: N/A  · Pertinent Negatives: N/A    Additional History of Present Condition:  Patient report that the knuckle pad does not hurt but is still present       Assessment and Plan:  Based on a thorough discussion of this condition and the management approach to it (including a comprehensive discussion of the known risks, side effects and potential benefits of treatment), the patient (family) agrees to implement the following specific plan:  ·  Please continue to monitor   · Chemical peel today in office with signed consent     2  EROSIO INTERDIGITALES    Physical Exam:   Anatomic Location Affected:  Right hand between 3 and 4th fingers    Morphological Description:  Red patch with pustule and erosions    Pertinent Positives:   Pertinent Negatives:     Additional History of Present Condition:  N/A    Assessment and Plan:  Based on a thorough discussion of this condition and the management approach to it (including a comprehensive discussion of the known risks, side effects and potential benefits of treatment), the patient (family) agrees to implement the following specific plan:   Start Hydroxyzine 25 mg take 1 tablet at night time    Start Ketoconazole Cream: Apply topically daily      Dry hand thoroughly after washing, limit contact with water    3  EPIDERMAL INCLUSION CYST    Physical Exam:   Anatomic Location Affected:  Right chin    Morphological Description:  1 mm round subcutaneous papule    Pertinent Positives:   Pertinent Negatives: Additional History of Present Condition:  N/A    Assessment and Plan:  Based on a thorough discussion of this condition and the management approach to it (including a comprehensive discussion of the known risks, side effects and potential benefits of treatment), the patient (family) agrees to implement the following specific plan:   Electrocautery today in office     What are epidermal inclusion cysts? Epidermal inclusion cysts are the most common, benign cutaneous cysts  There are many different names for epidermal inclusion cysts, including epidermoid cyst, epidermal cyst, infundibular cyst, inclusion cyst, and keratin cyst  These cysts can occur anywhere on the body and typically present as nodules directly underneath the skin  There is often a visible pore or opening in the center  The cysts are freely moveable and can range from a few millimeters to several centimeters in diameter  The center of epidermoid cysts almost always contains keratin, which has a cheesy appearance, and not sebum  They also do not originate from sebaceous glands  Therefore, epidermal inclusion cysts are not the same as sebaceous cysts  Cysts may remain stable or progressively enlarge over time  There are no reliable predictive factors to tell if an epidermal inclusion cyst will enlarge, become inflamed, or remain quiescent  Infected cysts tend to become larger, turn red, and are more noticeable to the patient  There may be accompanying pain and discomfort       What causes epidermal inclusion cysts? Epidermal inclusion cysts often appear out of the blue and are not contagious  They are due to a proliferation of epidermal cells within the dermis and are more common in men than women  They occur more frequently in patients in their 20s to 45s  Epidermal inclusion cysts by themselves are usually not inherited, but they can be hereditary in rare syndromes such as Julian syndrome, nodular elastosis with cysts and comedones (Favre-Racouchot syndrome), and basal cell nevus syndrome (Gorlin syndrome)  Elderly patients with chronic sun-damaged skin areas have a higher likelihood of developing epidermoid cysts  They often occur in areas where hair follicles have been inflamed or repeatedly irritated are more frequent in patients with acne vulgaris  In the  period, they are called milia  Patients on BRAF inhibitors such as imiquimod and cyclosporine have a higher incidence of epidermoid cysts of the face  How do we diagnose an epidermal inclusion cyst?  Epidermoid inclusion cysts are often diagnosed by history and physical exam  There is usually no need for biopsy prior to removal   Radiographic and laboratory exams, such as ultrasound studies, are unnecessary and not typically ordered unless the practitioner suspects a genetic condition  What is the treatment for an epidermal inclusion cyst?  Inflamed, uninfected epidermal inclusion cysts rarely resolve spontaneously without therapy or surgical intervention  Treatment is not emergent unless desired by the patient  Definitive treatment is via surgical excision with walls intact  This method will prevent recurrence  This is best done when the cyst is not inflamed, to decrease the probability of rupture during surgery     - A local anesthetic will be injected around the cyst  - A small incision is made in the skin overlying the cyst, and contents are expressed  - The incision is repaired with sutures    Another option is to use a 4mm punch biopsy with cyst extraction through the defect  Incision and drainage is often needed if the cyst is infected or inflamed  If there is surrounding cellulitis, oral antibiotic therapy may be necessary  The common agents used target methicillin sensitive Staphylococcal aureus and methicillin resistant S aureus in areas of high prevalence                 Scribe Attestation    I,:  Silvia Escalona am acting as a scribe while in the presence of the attending physician :       I,:  Carlos Winters MD personally performed the services described in this documentation    as scribed in my presence :

## 2021-07-21 NOTE — PATIENT INSTRUCTIONS
1  FOLLOW-UP: KNUCKLEPAD     Assessment and Plan:  Based on a thorough discussion of this condition and the management approach to it (including a comprehensive discussion of the known risks, side effects and potential benefits of treatment), the patient (family) agrees to implement the following specific plan:  ·  Please continue to monitor   · Chemical peel today in office with signed consent     2  IMPETIGO    Assessment and Plan:  Based on a thorough discussion of this condition and the management approach to it (including a comprehensive discussion of the known risks, side effects and potential benefits of treatment), the patient (family) agrees to implement the following specific plan:   Start Hydroxyzine 25 mg take 1 tablet at night time    Start Ketoconazole Cream: Apply topically daily     What is impetigo? Impetigo is due to localized, superficial and infection with Staphylococcus aureus and/or Streptococcus pyogenes  Ecthyma is a deeper infection caused by the same organisms  The infection does not affect hair follicles unlike a boil  These infections may complicate wound healing, infestations and all forms of dermatitis  On the other hand, infections may also lead to dermatitis flare ups  Impetigo is often found in children and is also highly contagious  Most people get impetigo through skin-to-skin contact with someone who has it  Children and athletes like wrestlers and football players often get it this way  It's also possible to get it by using something infected with the bacteria that cause impetigo such as an infected towel or sports equipment  Wearing infected clothing is another way to get impetigo      Other predisposing causes include   Climatic conditions (humidity, occlusive clothing)   Underlying skin disease (atopic dermatitis, hidradenitis suppurativa)   Iron deficiency   Diabetes mellitus   Defective neutrophil function (treated with oral vitamin C)   Immunodeficiency, including hypogammaglobulinemia and HIV infection    What are the symptoms of impetigo? Staphylococcal impetigo is characterized by surface honey-yellow crusting or blisters  It tends to be itchy  Streptococcal impetigo is characterized by crusting and ulceration  Ecthyma results in scabs covering full skin thickness ulcers  These deeper infections may be painful   Starts with one or more sores, which are often itchy   The sores quickly burst, and the skin can be red or raw where the sores have broken open   Glands near the sores may feel swollen   Crusts, usually honey-colored, form   The skin heals without scarring, unless scratching cuts deep into the skin  The infection can spread to other areas of the body, where you'll see this process begin all over again  This is one reason treatment is so important  A severe form, bullous impetigo, is due to S  aureus that produces an exfoliative exotoxin, exfoliatin  This produces a split between the stratum granulosum and stratum spinosum within the epidermis, causing blisters to form   Blisters appear that contain a cloudy or yellow fluid   The blisters become limp and transparent and then break open   Crusty sores form where the blisters have broken open   The skin tends to heal without scarring  Ecthyma:  Ecthyma (ec-thy-ma) can develop when impetigo goes untreated  This is a more serious type of infection because it goes deeper into the skin  When a person has ecthyma, you'll see:   Painful blisters   Blisters turn into deep, open sores   Thick crusts develop, often with redness on the surrounding skin   Because the infection goes deeper into the skin, you may see scars once the skin heals  How do we treat impetigo? Dermatologists often prescribe an antibiotic that you apply to the skin, such as mupirocin or retapamulin  The [place]  Food and Drug Administration (FDA) has approved retapamulin to treat impetigo in children as young as 6 months old  Mupirocin is FDA approved to treat people 15years of age and older  When necessary, a dermatologist may prescribe one of these medicines to treat a child younger than the FDA-approved age  This is called off-label use and is legal  It can also be very helpful  If a dermatologist prescribes an antibiotic you apply to the skin, you would apply it to the skin that is affected by impetigo  If you experience several outbreaks, you may need to apply it inside the nostrils  The bacteria that cause impetigo often thrive in the nostrils  - Meticulous wound care and antiseptics (povidone iodine, chlorhexidine, triclosan and others) as local application and cleanser  Sometimes stronger medicine is necessary for more extensive or recurrent infections  Your dermatologist can prescribe an antibiotic that you take by mouth  - First line treatment should be with flucloxacillin or dicloxacillin  In penicillin-allergic patients, erythromycin may be used but there is a higher rate of resistance  - A few patients need injections of an antibiotic   - Your dermatologist may take swabs from active lesions and nostrils to determine antibiotic sensitivity  Along with local patterns of bacterial strains can guide which antibiostic to use  Choices include:  - Cephalexin  - Co-amoxiclav  - Trimethoprim + sulphamethoxazole  - Ciprofloxacin  - Fusidic acid  - Rifampicin  - Clindamycin  In general, Staphylococcal infections are contagious, requiring careful attention to hygiene   Wash hands frequently   Use antiseptics for bathing   Hot wash clothing, bedding, towels   Avoid sharing clothing and towels    3   EPIDERMAL INCLUSION CYST    Assessment and Plan:  Based on a thorough discussion of this condition and the management approach to it (including a comprehensive discussion of the known risks, side effects and potential benefits of treatment), the patient (family) agrees to implement the following specific plan:   Electrocautery today in office     What are epidermal inclusion cysts? Epidermal inclusion cysts are the most common, benign cutaneous cysts  There are many different names for epidermal inclusion cysts, including epidermoid cyst, epidermal cyst, infundibular cyst, inclusion cyst, and keratin cyst  These cysts can occur anywhere on the body and typically present as nodules directly underneath the skin  There is often a visible pore or opening in the center  The cysts are freely moveable and can range from a few millimeters to several centimeters in diameter  The center of epidermoid cysts almost always contains keratin, which has a cheesy appearance, and not sebum  They also do not originate from sebaceous glands  Therefore, epidermal inclusion cysts are not the same as sebaceous cysts  Cysts may remain stable or progressively enlarge over time  There are no reliable predictive factors to tell if an epidermal inclusion cyst will enlarge, become inflamed, or remain quiescent  Infected cysts tend to become larger, turn red, and are more noticeable to the patient  There may be accompanying pain and discomfort  What causes epidermal inclusion cysts? Epidermal inclusion cysts often appear out of the blue and are not contagious  They are due to a proliferation of epidermal cells within the dermis and are more common in men than women  They occur more frequently in patients in their 20s to 45s  Epidermal inclusion cysts by themselves are usually not inherited, but they can be hereditary in rare syndromes such as Julian syndrome, nodular elastosis with cysts and comedones (Favre-Racouchot syndrome), and basal cell nevus syndrome (Gorlin syndrome)  Elderly patients with chronic sun-damaged skin areas have a higher likelihood of developing epidermoid cysts  They often occur in areas where hair follicles have been inflamed or repeatedly irritated are more frequent in patients with acne vulgaris   In the  period, they are called milia  Patients on BRAF inhibitors such as imiquimod and cyclosporine have a higher incidence of epidermoid cysts of the face  How do we diagnose an epidermal inclusion cyst?  Epidermoid inclusion cysts are often diagnosed by history and physical exam  There is usually no need for biopsy prior to removal   Radiographic and laboratory exams, such as ultrasound studies, are unnecessary and not typically ordered unless the practitioner suspects a genetic condition  What is the treatment for an epidermal inclusion cyst?  Inflamed, uninfected epidermal inclusion cysts rarely resolve spontaneously without therapy or surgical intervention  Treatment is not emergent unless desired by the patient  Definitive treatment is via surgical excision with walls intact  This method will prevent recurrence  This is best done when the cyst is not inflamed, to decrease the probability of rupture during surgery  - A local anesthetic will be injected around the cyst  - A small incision is made in the skin overlying the cyst, and contents are expressed  - The incision is repaired with sutures    Another option is to use a 4mm punch biopsy with cyst extraction through the defect  Incision and drainage is often needed if the cyst is infected or inflamed  If there is surrounding cellulitis, oral antibiotic therapy may be necessary  The common agents used target methicillin sensitive Staphylococcal aureus and methicillin resistant S aureus in areas of high prevalence

## 2021-07-23 ENCOUNTER — HOSPITAL ENCOUNTER (OUTPATIENT)
Dept: RADIOLOGY | Facility: HOSPITAL | Age: 56
Discharge: HOME | End: 2021-07-23
Attending: ORTHOPAEDIC SURGERY
Payer: COMMERCIAL

## 2021-07-23 DIAGNOSIS — M48.061 SPINAL STENOSIS OF LUMBAR REGION WITHOUT NEUROGENIC CLAUDICATION: ICD-10-CM

## 2021-07-23 PROCEDURE — 72148 MRI LUMBAR SPINE W/O DYE: CPT

## 2021-07-23 PROCEDURE — G1004 CDSM NDSC: HCPCS

## 2021-08-12 ENCOUNTER — OFFICE VISIT (OUTPATIENT)
Dept: PODIATRY | Facility: CLINIC | Age: 56
End: 2021-08-12
Payer: COMMERCIAL

## 2021-08-12 VITALS
SYSTOLIC BLOOD PRESSURE: 102 MMHG | RESPIRATION RATE: 17 BRPM | HEART RATE: 98 BPM | DIASTOLIC BLOOD PRESSURE: 66 MMHG | BODY MASS INDEX: 37.22 KG/M2 | WEIGHT: 260 LBS | HEIGHT: 70 IN

## 2021-08-12 DIAGNOSIS — M20.42 HAMMER TOES OF BOTH FEET: ICD-10-CM

## 2021-08-12 DIAGNOSIS — M79.672 PAIN IN BOTH FEET: Primary | ICD-10-CM

## 2021-08-12 DIAGNOSIS — B35.1 ONYCHOMYCOSIS: ICD-10-CM

## 2021-08-12 DIAGNOSIS — M20.41 HAMMER TOES OF BOTH FEET: ICD-10-CM

## 2021-08-12 DIAGNOSIS — M79.671 PAIN IN BOTH FEET: Primary | ICD-10-CM

## 2021-08-12 PROCEDURE — 99212 OFFICE O/P EST SF 10 MIN: CPT | Performed by: PODIATRIST

## 2021-08-16 ENCOUNTER — CONSULT (OUTPATIENT)
Dept: PAIN MEDICINE | Facility: CLINIC | Age: 56
End: 2021-08-16
Payer: COMMERCIAL

## 2021-08-16 VITALS
BODY MASS INDEX: 36.51 KG/M2 | SYSTOLIC BLOOD PRESSURE: 99 MMHG | WEIGHT: 255 LBS | DIASTOLIC BLOOD PRESSURE: 66 MMHG | HEIGHT: 70 IN | HEART RATE: 80 BPM

## 2021-08-16 DIAGNOSIS — G89.4 CHRONIC PAIN SYNDROME: Primary | ICD-10-CM

## 2021-08-16 DIAGNOSIS — M51.36 LUMBAR DEGENERATIVE DISC DISEASE: ICD-10-CM

## 2021-08-16 DIAGNOSIS — G89.29 CHRONIC MIDLINE LOW BACK PAIN WITH BILATERAL SCIATICA: ICD-10-CM

## 2021-08-16 DIAGNOSIS — M54.16 LUMBAR RADICULOPATHY: ICD-10-CM

## 2021-08-16 DIAGNOSIS — M54.41 CHRONIC MIDLINE LOW BACK PAIN WITH BILATERAL SCIATICA: ICD-10-CM

## 2021-08-16 DIAGNOSIS — M54.42 CHRONIC MIDLINE LOW BACK PAIN WITH BILATERAL SCIATICA: ICD-10-CM

## 2021-08-16 PROCEDURE — 99244 OFF/OP CNSLTJ NEW/EST MOD 40: CPT | Performed by: ANESTHESIOLOGY

## 2021-08-16 NOTE — PROGRESS NOTES
Assessment:  1  Chronic pain syndrome    2  Chronic midline low back pain with bilateral sciatica    3  Lumbar radiculopathy    4  Lumbar degenerative disc disease        Plan:  Orders Placed This Encounter   Procedures    Ambulatory referral to Physical Therapy     Standing Status:   Future     Standing Expiration Date:   8/16/2022     Referral Priority:   Routine     Referral Type:   Physical Therapy     Referral Reason:   Specialty Services Required     Requested Specialty:   Physical Therapy     Number of Visits Requested:   1     Expiration Date:   8/16/2022     My impressions and treatment recommendations were discussed in detail with the patient, who verbalized understanding and had no further questions  The patient is complaining of pain in his low back and primarily in his left lower extremity in what appears to be the left L3 and L4 distribution  Since he does have lumbar degenerative disc disease at this level, I felt it reasonable to have the patient undergo a left L3 and L4 transforaminal epidural steroid injection since this could be potentially therapeutic  The procedures, its risks, and benefits were explained in detail to the patient  Risks include but are not limited to bleeding, infection, hematoma formation, abscess formation, weakness, headache, failure the pain to improve, nerve irritation or damage, and potential worsening of the pain  The patient verbalized understanding and wished to proceed with the procedure  I also felt a reasonable to have the patient undergo a course of physical therapy 2-3 times per week for 4-6 weeks  He will start 3-5 days after the transforaminal epidural steroid injection  Follow-up is planned in 4 weeks time or sooner as warranted  Discharge instructions were provided  I personally saw and examined the patient and I agree with the above discussed plan of care      History of Present Illness:    Kory Yanes is a 63 y o  male who presents to [address] [place] [address]  [facility] for initial evaluation of the above stated pain complaints  The patient has a past medical and chronic pain history as outlined in the assessment section  He was referred by Dr Tan Scales  The patient reports pain primarily in his low back and left lower extremity in what appears to be the left L3 and L4 distribution  He describes his pain as severe and 5/10 on the verbal numerical pain rating scale  His pain is nearly constant in nature and worse only after lifting  He describes his pain as shooting, numbness, pins and needles, throbbing, dull/aching  He reports weakness in his lower extremities  He does not ambulate with any assistive devices  Lying down and sitting decreases pain  Relaxation decreases pain  Standing, bending, and exercise increases pain  Physical therapy, exercises, and heat/ice treatment provided no pain relief  Hydrocodone did provide him pain relief recently  Cyclobenzaprine also provides him some pain relief  Review of Systems:    Review of Systems   Constitutional: Positive for unexpected weight change (Weight gain)  Negative for fever  HENT: Negative for trouble swallowing  Eyes: Negative for visual disturbance  Respiratory: Positive for shortness of breath  Negative for wheezing  Cardiovascular: Negative for chest pain and palpitations  Gastrointestinal: Negative for constipation, diarrhea, nausea and vomiting  Endocrine: Negative for cold intolerance, heat intolerance and polydipsia  Genitourinary: Negative for difficulty urinating and frequency  Musculoskeletal: Negative for arthralgias, gait problem, joint swelling and myalgias  Skin: Negative for rash  Neurological: Negative for dizziness, seizures, syncope, weakness and headaches  Hematological: Does not bruise/bleed easily  Psychiatric/Behavioral: Negative for dysphoric mood  All other systems reviewed and are negative          Patient Active Problem List   Diagnosis    Plantar fasciitis    Pain in both feet    Congenital pes planus of right foot    Congenital pes planus of left foot    Tinea pedis of both feet    Dark stools    Bilateral impacted cerumen       Past Medical History:   Diagnosis Date    BPH (benign prostatic hyperplasia)     Hypertension        Past Surgical History:   Procedure Laterality Date    COLONOSCOPY N/A 10/18/2017    Procedure: COLONOSCOPY;  Surgeon: Chong Madera MD;  Location: [facility];   Service: Gastroenterology    HEMORRHOID SURGERY         Family History   Problem Relation Age of Onset    Heart disease Mother     Cancer Father         stomach    Hypertension Father     Stomach cancer Father     Colon cancer Maternal Aunt        Social History     Occupational History    Not on file   Tobacco Use    Smoking status: Never Smoker    Smokeless tobacco: Never Used   Vaping Use    Vaping Use: Never used   Substance and Sexual Activity    Alcohol use: Yes     Comment: occasionally     Drug use: No    Sexual activity: Not on file         Current Outpatient Medications:     atorvastatin (LIPITOR) 10 mg tablet, , Disp: , Rfl:     cholecalciferol (VITAMIN D3) 1,000 units tablet, Take 1,000 Units by mouth daily, Disp: , Rfl:     Cholecalciferol (VITAMIN D3) 09521 units CAPS, Take by mouth once a week, Disp: , Rfl:     doxazosin (CARDURA) 4 mg tablet, Take 4 mg by mouth daily at bedtime, Disp: , Rfl:     Flurandrenolide (Cordran) 4 MCG/SQCM TAPE, Apply 1 each topically 2 (two) times a day, Disp: 60 each, Rfl: 1    HYDROcodone-acetaminophen (NORCO) 5-325 mg per tablet, as needed, Disp: , Rfl:     hydrOXYzine HCL (ATARAX) 25 mg tablet, Take 1 tablet (25 mg total) by mouth every 6 (six) hours as needed for itching, Disp: 30 tablet, Rfl: 0    ketoconazole (NIZORAL) 2 % cream, Apply topically daily, Disp: 15 g, Rfl: 0    tamsulosin (FLOMAX) 0 4 mg, Take 0 4 mg by mouth daily with dinner, Disp: , Rfl:    cyclobenzaprine (FLEXERIL) 10 mg tablet, 10 mg as needed  (Patient not taking: Reported on 8/16/2021), Disp: , Rfl:     diclofenac sodium (VOLTAREN) 1 %, Apply 4 g topically 4 (four) times a day (Patient not taking: Reported on 5/26/2021), Disp: 4 Tube, Rfl: 1    meloxicam (MOBIC) 7 5 mg tablet, Take 1 tablet (7 5 mg total) by mouth daily for 10 days (Patient not taking: Reported on 11/20/2019), Disp: 10 tablet, Rfl: 0    traMADol (ULTRAM) 50 mg tablet, , Disp: , Rfl:     Wound Dressings (Medihoney Wound/Burn Dressing) GEL, Apply 1 application topically daily (Patient not taking: Reported on 5/26/2021), Disp: 15 mL, Rfl: 1    Wound Dressings (Medihoney Wound/Burn Dressing) GEL, , Disp: , Rfl:     No Known Allergies    Physical Exam:    BP 99/66   Pulse 80   Ht 5' 10" (1 778 m)   Wt 116 kg (255 lb)   BMI 36 59 kg/m²     Constitutional: obese  Eyes: anicteric  HEENT: grossly intact  Neck: supple, symmetric, trachea midline and no masses   Pulmonary:even and unlabored  Cardiovascular:No edema or pitting edema present  Skin:Normal without rashes or lesions and well hydrated  Psychiatric:Mood and affect appropriate  Neurologic:Cranial Nerves II-XII grossly intact  Musculoskeletal:normal     Lumbar Spine Exam    Appearance:  Normal lordosis  Palpation/Tenderness:  no tenderness or spasm  Sensory:  no sensory deficits noted  Range of Motion:  Flexion:   Moderately limited  with pain  Extension:  Moderately limited  with pain  Lateral Flexion - Left:  Moderately limited  with pain  Lateral Flexion - Right:  Moderately limited  with pain  Rotation - Left:  Moderately limited  with pain  Rotation - Right:  Moderately limited  with pain   Lumbar facet loading is negative bilaterally  Motor Strength:  Left hip flexion:  5/5  Left hip extension:  5/5  Right hip flexion:  5/5  Right hip extension:  5/5  Left knee flexion:  5/5  Left knee extension:  5/5  Right knee flexion:  5/5  Right knee extension:  5/5  Left foot dorsiflexion:  5/5  Left foot plantar flexion:  5/5  Right foot dorsiflexion:  5/5  Right foot plantar flexion:  5/5  Reflexes:  Left Patellar:  2+   Right Patellar:  2+   Left Achilles:  2+   Right Achilles:  2+   Special Tests:  Left Straight Leg Test:  negative  Right Straight Leg Test:  negative  Left Km's Maneuver:  negative  Right Km's Maneuver:  negative    Imaging    MRI LUMBAR SPINE 7/23/2021    Study Result    Narrative & Impression   MRI LUMBAR SPINE WITHOUT CONTRAST     INDICATION: [id number]: Spinal stenosis, lumbar region without neurogenic claudication  Chronic low back pain with weakness in the left lower extremity      COMPARISON:  10/4/2016     TECHNIQUE:  Sagittal T1, sagittal T2, sagittal inversion recovery, axial T1 and axial T2, coronal T2     IMAGE QUALITY:  Diagnostic     FINDINGS:     VERTEBRAL BODIES:  There are 5 lumbar type vertebral bodies  Mild levoscoliosis of the thoracolumbar junction  Mild dextroscoliosis of the lumbosacral junction  Normal lordosis  Spinal alignment is stable  Mild scattered degenerative endplate   changes  No focally suspicious marrow lesions  No bone marrow edema or compression abnormality      SACRUM:  Normal signal within the sacrum  No evidence of insufficiency or stress fracture      DISTAL CORD AND CONUS:  Normal size and signal within the distal cord and conus  The conus medullaris terminates at the L1 level      PARASPINAL SOFT TISSUES:  Paraspinal soft tissues are unremarkable      LOWER THORACIC DISC SPACES:  Normal disc height and signal   No disc herniation, canal stenosis or foraminal narrowing      LUMBAR DISC SPACES:     L1-L2:  There is a right paracentral disc herniation, protrusion type  Minimal central canal narrowing  Neural foramina patent bilaterally  This level is similar to the prior study      L2-L3:  There is a small right paracentral disc protrusion  Mild central canal narrowing    Mild left greater than right neural foraminal narrowing  This level is similar to the prior study      L3-L4:  There is a left neural foraminal disc protrusion  Mild left neural foraminal narrowing  Central canal and right neural foramen patent  This level is similar to the prior study      L4-L5:  There is a left neural foraminal disc protrusion  There is moderate left neural foraminal narrowing  Central canal patent  Mild right neural foraminal narrowing  This level is similar to the prior study      L5-S1:  Loss of disc height and signal noted  There is a left neural foraminal disc protrusion  Mild left neural foraminal narrowing  Central canal right neural foramen patent   This level is similar to the prior study      IMPRESSION:     Scattered spondylotic changes most pronounced on the left at L4-5 are similar to the prior study

## 2021-08-17 ENCOUNTER — TELEPHONE (OUTPATIENT)
Dept: PAIN MEDICINE | Facility: CLINIC | Age: 56
End: 2021-08-17

## 2021-08-17 NOTE — TELEPHONE ENCOUNTER
Scheduled patient for 9/2/21  Patient denies RX blood thinners/ NSAIDS  Nothing to eat or drink 1 hour prior to procedure  Needs to arrange transportation  Proper clothing for procedure  No vaccines 2 weeks prior or after procedure  If ill or place on antibiotics, please call to reschedule

## 2021-09-03 NOTE — PROGRESS NOTES
Signed  Encounter Date: 3/12/2020   Assessment/Plan:  Pain upon ambulation  Hammertoe formation  Pes planus  Need for orthotics  Ingrown toenail  Paronychia      Plan  Foot exam performed  Patient educated on condition  Patient will use orthotics daily  He will stretch  All nails debrided without pain or complication             Diagnoses and all orders for this visit:     Pain in both feet     Onychomycosis     Hammer toes of both feet     Ingrown toenail     Hammer toe of right foot            Subjective:  Patient has pain upon ambulation  He has pain in his toes with ambulation  He has painful hammertoes  He uses orthotics daily  No history of trauma     No Known Allergies        Current Outpatient Medications:     cholecalciferol (VITAMIN D3) 1,000 units tablet, Take 1,000 Units by mouth daily, Disp: , Rfl:     Cholecalciferol (VITAMIN D3) 98556 units CAPS, Take by mouth once a week, Disp: , Rfl:     diclofenac sodium (VOLTAREN) 1 %, Apply 4 g topically 4 (four) times a day (Patient taking differently: Apply 4 g topically 4 (four) times a day ), Disp: 4 Tube, Rfl: 1    doxazosin (CARDURA) 4 mg tablet, Take 4 mg by mouth daily at bedtime, Disp: , Rfl:     meloxicam (MOBIC) 7 5 mg tablet, Take 1 tablet (7 5 mg total) by mouth daily for 10 days (Patient not taking: Reported on 11/20/2019), Disp: 10 tablet, Rfl: 0    tamsulosin (FLOMAX) 0 4 mg, Take 0 4 mg by mouth daily with dinner, Disp: , Rfl:          Patient Active Problem List   Diagnosis    Plantar fasciitis    Pain in both feet    Congenital pes planus of right foot    Congenital pes planus of left foot    Tinea pedis of both feet    Dark stools             Patient ID: Karen Osman is a 53 y o  male      HPI     The following portions of the patient's history were reviewed and updated as appropriate:      family history includes Cancer in his father; Colon cancer in his maternal aunt;  Heart disease in his mother; Hypertension in his father; Stomach cancer in his father        reports that he has never smoked  He has never used smokeless tobacco  He reports that he drinks alcohol  He reports that he does not use drugs          Vitals:     03/12/20 0927   BP: 132/82   Pulse: 89   Resp: 16         Review of Systems       Objective:  Patient's shoes and socks removed  Foot ExamPhysical Exam          Pulses palpable bilateral PT DP and PT pulses 2/4 bilateral  Significant pes planus bilateral, there is decreased  of the arch on weight-bearing  Right 2nd digit there is medial deviation of the PIPJ mild pain on palpation of 2nd MPJ mild swelling  Severe hallux limitus bilateral left worse than right less than 15° of dorsiflexion 1st ray  No hypermobility of the 1st ray  Muscle strength 5/5 all groups bilateral feet and ankles  Moderate equinus bilateral 5° dorsiflexion knee flexed and extended  Negative edema negative erythema no signs of infection  Nails are thickened discolored dystrophic mild onychomycosis  Ingrown nail both borders bilateral hallux     Ingrown right hallux medial border   Mild hyperkeratotic lesion on the medial border but no sign of infection  Mild pain on palpation plantar medial heel bilateral  Some rigid hammertoes 2nd digit bilateral                       Signed  Encounter Date: 3/12/2020   Assessment/Plan:  Pain upon ambulation  Hammertoe formation  Pes planus  Need for orthotics  Ingrown toenail  Paronychia      Plan  Foot exam performed  Patient educated on condition  Patient will use orthotics daily  He will stretch  All nails debrided without pain or complication             Diagnoses and all orders for this visit:     Pain in both feet     Onychomycosis     Hammer toes of both feet     Ingrown toenail     Hammer toe of right foot            Subjective:  Patient has pain upon ambulation  He has pain in his toes with ambulation  He has painful hammertoes  He uses orthotics daily    No history of trauma     No Known Allergies        Current Outpatient Medications:     cholecalciferol (VITAMIN D3) 1,000 units tablet, Take 1,000 Units by mouth daily, Disp: , Rfl:     Cholecalciferol (VITAMIN D3) 58329 units CAPS, Take by mouth once a week, Disp: , Rfl:     diclofenac sodium (VOLTAREN) 1 %, Apply 4 g topically 4 (four) times a day (Patient taking differently: Apply 4 g topically 4 (four) times a day ), Disp: 4 Tube, Rfl: 1    doxazosin (CARDURA) 4 mg tablet, Take 4 mg by mouth daily at bedtime, Disp: , Rfl:     meloxicam (MOBIC) 7 5 mg tablet, Take 1 tablet (7 5 mg total) by mouth daily for 10 days (Patient not taking: Reported on 11/20/2019), Disp: 10 tablet, Rfl: 0    tamsulosin (FLOMAX) 0 4 mg, Take 0 4 mg by mouth daily with dinner, Disp: , Rfl:          Patient Active Problem List   Diagnosis    Plantar fasciitis    Pain in both feet    Congenital pes planus of right foot    Congenital pes planus of left foot    Tinea pedis of both feet    Dark stools             Patient ID: Gustavo Jaffe is a 53 y o  male      HPI     The following portions of the patient's history were reviewed and updated as appropriate:      family history includes Cancer in his father; Colon cancer in his maternal aunt; Heart disease in his mother; Hypertension in his father; Stomach cancer in his father        reports that he has never smoked  He has never used smokeless tobacco  He reports that he drinks alcohol  He reports that he does not use drugs          Vitals:     03/12/20 0927   BP: 132/82   Pulse: 89   Resp: 16         Review of Systems       Objective:  Patient's shoes and socks removed     Foot ExamPhysical Exam          Pulses palpable bilateral PT DP and PT pulses 2/4 bilateral  Significant pes planus bilateral, there is decreased  of the arch on weight-bearing  Right 2nd digit there is medial deviation of the PIPJ mild pain on palpation of 2nd MPJ mild swelling  Severe hallux limitus bilateral left worse than right less than 15° of dorsiflexion 1st ray  No hypermobility of the 1st ray  Muscle strength 5/5 all groups bilateral feet and ankles  Moderate equinus bilateral 5° dorsiflexion knee flexed and extended  Negative edema negative erythema no signs of infection  Nails are thickened discolored dystrophic mild onychomycosis  Ingrown nail both borders bilateral hallux     Ingrown right hallux medial border   Mild hyperkeratotic lesion on the medial border but no sign of infection  Mild pain on palpation plantar medial heel bilateral  Some rigid hammertoes 2nd digit bilateral

## 2021-09-16 ENCOUNTER — HOSPITAL ENCOUNTER (OUTPATIENT)
Facility: AMBULARY SURGERY CENTER | Age: 56
Setting detail: OUTPATIENT SURGERY
Discharge: HOME | End: 2021-09-16
Attending: ANESTHESIOLOGY | Admitting: ANESTHESIOLOGY
Payer: COMMERCIAL

## 2021-09-16 ENCOUNTER — APPOINTMENT (OUTPATIENT)
Dept: RADIOLOGY | Facility: HOSPITAL | Age: 56
End: 2021-09-16
Payer: COMMERCIAL

## 2021-09-16 VITALS
HEART RATE: 82 BPM | SYSTOLIC BLOOD PRESSURE: 146 MMHG | RESPIRATION RATE: 20 BRPM | TEMPERATURE: 97 F | OXYGEN SATURATION: 96 % | DIASTOLIC BLOOD PRESSURE: 84 MMHG

## 2021-09-16 PROCEDURE — 64484 NJX AA&/STRD TFRM EPI L/S EA: CPT | Performed by: ANESTHESIOLOGY

## 2021-09-16 PROCEDURE — 72020 X-RAY EXAM OF SPINE 1 VIEW: CPT

## 2021-09-16 PROCEDURE — 64483 NJX AA&/STRD TFRM EPI L/S 1: CPT | Performed by: ANESTHESIOLOGY

## 2021-09-16 RX ORDER — LIDOCAINE WITH 8.4% SOD BICARB 0.9%(10ML)
SYRINGE (ML) INJECTION AS NEEDED
Status: DISCONTINUED | OUTPATIENT
Start: 2021-09-16 | End: 2021-09-16 | Stop reason: HOSPADM

## 2021-09-16 RX ORDER — METHYLPREDNISOLONE ACETATE 80 MG/ML
INJECTION, SUSPENSION INTRA-ARTICULAR; INTRALESIONAL; INTRAMUSCULAR; SOFT TISSUE AS NEEDED
Status: DISCONTINUED | OUTPATIENT
Start: 2021-09-16 | End: 2021-09-16 | Stop reason: HOSPADM

## 2021-09-16 RX ORDER — BUPIVACAINE HYDROCHLORIDE 2.5 MG/ML
INJECTION, SOLUTION EPIDURAL; INFILTRATION; INTRACAUDAL AS NEEDED
Status: DISCONTINUED | OUTPATIENT
Start: 2021-09-16 | End: 2021-09-16 | Stop reason: HOSPADM

## 2021-09-16 NOTE — OP NOTE
ATTENDING PHYSICIAN:  Iram Fields MD     PROCEDURE:  1  Left L3 transforaminal epidural steroid injection under fluoroscopic guidance  2  Left L4 transforaminal epidural steroid injection under fluoroscopic guidance  PRE-PROCEDURE DIAGNOSIS:  Low back pain and left lower extremity radiculopathy  POST-PROCEDURE DIAGNOSIS:  Low back pain and left lower extremity radiculopathy  ANESTHESIA:  Local     ESTIMATED BLOOD LOSS:  Minimal     COMPLICATIONS:  None  LOCATION:  [address]  CONSENT:  Today's procedure, its potential benefits as well as its risks and potential side effects were reviewed  Discussed risks of the procedure including bleeding, infection, nerve irritation or damage, reactions to the medications, weakness, headache, failure of the pain to improve, and potential worsening of the pain were explained to the patient who verbalized understanding and who wished to proceed  Written informed consent was thereby obtained  DESCRIPTION OF THE PROCEDURE:  After written informed consent was obtained, the patient was taken to the fluoroscopy suite and placed in the prone position  Anatomical landmarks were identified by way of fluoroscopy in multiple views  The skin of the lumbar region was prepped using antiseptic and draped in the usual sterile fashion  Strict aseptic technique was utilized  The skin and subcutaneous tissues at the needle entry site were infiltrated with a total of 5 mL of 1% preservative-free lidocaine using a 25-gauge 1-1/2-inch needle  22-gauge needles were then incrementally advanced under fluoroscopic guidance in the oblique view into the neural foramina as mentioned above  Proper placement into each of the neural foramen was confirmed with fluoroscopy in both the lateral and AP views  After negative aspiration for CSF or heme, contrast was injected, which delineated the nerve roots and the epidural space under fluoroscopy in the AP view  There was only a transient pressure paresthesia that resolved immediately upon injection  After negative aspiration, a 2 mL of a 4 mL injectate consisting of 3 mL of preservative-free 0 25% bupivacaine and 1 mL of Depo-Medrol 80 mg/mL was slowly injected into each of the needles as delineated above  The patient tolerated the procedure well and all needles were removed intact  Hemostasis was maintained  There were no apparent paresthesias or complications  The skin was wiped clean and a Band-Aid was placed as appropriate  The patient was monitored for an appropriate period of time and remained hemodynamically stable following the procedure  The patient was ultimately discharged to home with supervision in good condition and instructed to call the office in approximately 7-10 days with an update or sooner as warranted  Discharge instructions were provided  I was present for and participated in all key and critical portions of this procedure      Roseline Pineda MD  9/16/2021  1:30 PM

## 2021-09-16 NOTE — DISCHARGE INSTRUCTIONS
Epidural Steroid Injection   WHAT YOU NEED TO KNOW:   An epidural steroid injection (KESHAV) is a procedure to inject steroid medicine into the epidural space  The epidural space is between your spinal cord and vertebrae  Steroids reduce inflammation and fluid buildup in your spine that may be causing pain  You may be given pain medicine along with the steroids  DISCHARGE INSTRUCTIONS:   Call your local emergency number (911 in the [address] [place] [address],[address]) if:   · You have a seizure  · You have trouble moving your legs  Seek care immediately if:   · Blood soaks through your bandage  · You have a fever or chills, severe back pain, and the procedure area is sensitive to the touch  · You cannot control when you urinate or have a bowel movement  Call your doctor if:   · You have weakness or numbness in your legs  · Your wound is red, swollen, or draining pus  · You have nausea or are vomiting  · Your face or neck is red and you feel warm  · You have more pain than you had before the procedure  · You have swelling in your hands or feet  · You have questions or concerns about your condition or care  Care for your wound as directed: You may remove the bandage before you go to bed the day of your procedure  You may take a shower, but do not take a bath for at least 24 hours  Self-care:   · Do not drive,  use machines, or do strenuous activity for 24 hours after your procedure or as directed  · Continue other treatments  as directed  Steroid injections alone will not control your pain  The injections are meant to be used with other treatments, such as physical therapy  Follow up with your doctor as directed:  Write down your questions so you remember to ask them during your visits  © Copyright [employer] 2021 Information is for End User's use only and may not be sold, redistributed or otherwise used for commercial purposes   All illustrations and images included in CareNotes® are the copyrighted property of A D A M [employer] Inc  or [facility] Kiara Chow   The above information is an  only  It is not intended as medical advice for individual conditions or treatments  Talk to your doctor, nurse or pharmacist before following any medical regimen to see if it is safe and effective for you

## 2021-09-16 NOTE — H&P
History of Present Illness: The patient is a 63 y o  male who presents with complaints of low back pain and left lower extremity radiculopathy  Patient Active Problem List   Diagnosis    Plantar fasciitis    Pain in both feet    Congenital pes planus of right foot    Congenital pes planus of left foot    Tinea pedis of both feet    Dark stools    Bilateral impacted cerumen       Past Medical History:   Diagnosis Date    BPH (benign prostatic hyperplasia)     Hypertension        Past Surgical History:   Procedure Laterality Date    COLONOSCOPY N/A 10/18/2017    Procedure: COLONOSCOPY;  Surgeon: David Arizmendi MD;  Location: [place] [address] GI LAB; Service: Gastroenterology    HEMORRHOID SURGERY         No current facility-administered medications for this encounter  No Known Allergies    Physical Exam:   Vitals:    09/16/21 1314   BP: 157/78   Pulse: 87   Resp: 18   Temp: (!) 96 8 °F (36 °C)   SpO2: 97%     General: Awake, Alert, Oriented x 3, Mood and affect appropriate  Respiratory: Respirations even and unlabored  Cardiovascular: Peripheral pulses intact; no edema  Musculoskeletal Exam:  Tenderness in lumbar spine region    ASA Score: 2    Patient/Chart Verification  Patient ID Verified: Verbal, Armband  ID Band Applied: Yes  Consents Confirmed: Procedural  H&P( within 30 days) Verified: Yes  Interval H&P(within 24 hr) Complete (required for Outpatients and Surgery Admit only): Yes  Beta Blocker given : N/A  Pre-op Lab/Test Results Available: N/A  Does Patient Have a Prosthetic Device/Implant: No    Assessment:  Low back pain and left lower extremity radiculopathy  Plan:  Proceed with left L3 and L4 transforaminal epidural steroid injection

## 2021-09-23 ENCOUNTER — TELEPHONE (OUTPATIENT)
Dept: PAIN MEDICINE | Facility: CLINIC | Age: 56
End: 2021-09-23

## 2021-10-07 DIAGNOSIS — L01.00 IMPETIGO: ICD-10-CM

## 2021-10-07 RX ORDER — KETOCONAZOLE 20 MG/G
CREAM TOPICAL DAILY
Qty: 15 G | Refills: 0 | Status: SHIPPED | OUTPATIENT
Start: 2021-10-07

## 2021-10-14 ENCOUNTER — OFFICE VISIT (OUTPATIENT)
Dept: PODIATRY | Facility: CLINIC | Age: 56
End: 2021-10-14
Payer: COMMERCIAL

## 2021-10-14 VITALS
HEIGHT: 70 IN | SYSTOLIC BLOOD PRESSURE: 146 MMHG | DIASTOLIC BLOOD PRESSURE: 84 MMHG | RESPIRATION RATE: 17 BRPM | HEART RATE: 82 BPM | WEIGHT: 255 LBS | BODY MASS INDEX: 36.51 KG/M2

## 2021-10-14 DIAGNOSIS — L60.0 INGROWN TOENAIL: ICD-10-CM

## 2021-10-14 DIAGNOSIS — M20.41 HAMMER TOE OF RIGHT FOOT: ICD-10-CM

## 2021-10-14 DIAGNOSIS — B35.3 TINEA PEDIS OF BOTH FEET: ICD-10-CM

## 2021-10-14 DIAGNOSIS — M20.42 HAMMER TOES OF BOTH FEET: ICD-10-CM

## 2021-10-14 DIAGNOSIS — M20.41 HAMMER TOES OF BOTH FEET: ICD-10-CM

## 2021-10-14 DIAGNOSIS — M79.671 PAIN IN BOTH FEET: Primary | ICD-10-CM

## 2021-10-14 DIAGNOSIS — M79.672 PAIN IN BOTH FEET: Primary | ICD-10-CM

## 2021-10-14 DIAGNOSIS — B35.1 ONYCHOMYCOSIS: ICD-10-CM

## 2021-10-14 PROCEDURE — 99212 OFFICE O/P EST SF 10 MIN: CPT | Performed by: PODIATRIST

## 2021-10-18 ENCOUNTER — TELEPHONE (OUTPATIENT)
Dept: OBGYN CLINIC | Facility: CLINIC | Age: 56
End: 2021-10-18

## 2021-11-17 ENCOUNTER — OFFICE VISIT (OUTPATIENT)
Dept: OTOLARYNGOLOGY | Facility: CLINIC | Age: 56
End: 2021-11-17
Payer: COMMERCIAL

## 2021-11-17 VITALS — HEIGHT: 70 IN | WEIGHT: 260 LBS | BODY MASS INDEX: 37.22 KG/M2 | TEMPERATURE: 98.4 F

## 2021-11-17 DIAGNOSIS — H61.23 BILATERAL IMPACTED CERUMEN: Primary | ICD-10-CM

## 2021-11-17 PROCEDURE — 99213 OFFICE O/P EST LOW 20 MIN: CPT | Performed by: NURSE PRACTITIONER

## 2021-11-17 PROCEDURE — 69210 REMOVE IMPACTED EAR WAX UNI: CPT | Performed by: NURSE PRACTITIONER

## 2021-11-17 RX ORDER — TRIAMCINOLONE ACETONIDE 5 MG/G
CREAM TOPICAL
COMMUNITY
Start: 2021-10-13

## 2021-11-17 RX ORDER — AZITHROMYCIN 250 MG/1
TABLET, FILM COATED ORAL
COMMUNITY
Start: 2021-10-21

## 2021-12-16 ENCOUNTER — OFFICE VISIT (OUTPATIENT)
Dept: PODIATRY | Facility: CLINIC | Age: 56
End: 2021-12-16
Payer: COMMERCIAL

## 2021-12-16 VITALS — HEIGHT: 70 IN | WEIGHT: 260 LBS | BODY MASS INDEX: 37.22 KG/M2

## 2021-12-16 DIAGNOSIS — L60.8 DISCOLORATION OF NAIL: Primary | ICD-10-CM

## 2021-12-16 DIAGNOSIS — M79.671 PAIN IN BOTH FEET: ICD-10-CM

## 2021-12-16 DIAGNOSIS — M79.672 PAIN IN BOTH FEET: ICD-10-CM

## 2021-12-16 PROCEDURE — 99212 OFFICE O/P EST SF 10 MIN: CPT | Performed by: PODIATRIST

## 2022-02-17 ENCOUNTER — OFFICE VISIT (OUTPATIENT)
Dept: PODIATRY | Facility: CLINIC | Age: 57
End: 2022-02-17
Payer: COMMERCIAL

## 2022-02-17 VITALS
BODY MASS INDEX: 37.22 KG/M2 | HEART RATE: 82 BPM | RESPIRATION RATE: 17 BRPM | HEIGHT: 70 IN | DIASTOLIC BLOOD PRESSURE: 84 MMHG | SYSTOLIC BLOOD PRESSURE: 146 MMHG | WEIGHT: 260 LBS

## 2022-02-17 DIAGNOSIS — M79.672 PAIN IN BOTH FEET: Primary | ICD-10-CM

## 2022-02-17 DIAGNOSIS — L60.8 DISCOLORATION OF NAIL: ICD-10-CM

## 2022-02-17 DIAGNOSIS — B35.1 ONYCHOMYCOSIS: ICD-10-CM

## 2022-02-17 DIAGNOSIS — M20.42 HAMMER TOES OF BOTH FEET: ICD-10-CM

## 2022-02-17 DIAGNOSIS — M79.671 PAIN IN BOTH FEET: Primary | ICD-10-CM

## 2022-02-17 DIAGNOSIS — L60.0 INGROWN TOENAIL: ICD-10-CM

## 2022-02-17 DIAGNOSIS — M20.41 HAMMER TOES OF BOTH FEET: ICD-10-CM

## 2022-02-17 PROCEDURE — 99212 OFFICE O/P EST SF 10 MIN: CPT | Performed by: PODIATRIST

## 2022-04-01 NOTE — PROGRESS NOTES
Assessment/Plan:    Educated patient on the etiology of his lesion, patient use OTC onychomycotic management, patient opted for soaking his foot daily , all nails reduced, to patient tolerance, debulked, using a bur, patient return p r n  Diagnoses and all orders for this visit:    Pain in both feet    Onychomycosis    Discoloration of nail    Hammer toes of both feet    Ingrown toenail          Subjective:      Patient ID: Maria G King is a 61 y o  male  Patient returns to the office for discolored right hallucal toenail, with ingrown toenails and thickening of the skin at the bottom of the foot      The following portions of the patient's history were reviewed and updated as appropriate: allergies, current medications, past family history, past medical history, past social history, past surgical history and problem list     Review of Systems   Constitutional: Negative  Respiratory: Negative  Cardiovascular: Negative  Musculoskeletal: Negative  Skin: Negative  Historical Information   Past Medical History:   Diagnosis Date    BPH (benign prostatic hyperplasia)     Hypertension      Past Surgical History:   Procedure Laterality Date    COLONOSCOPY N/A 10/18/2017    Procedure: COLONOSCOPY;  Surgeon: Lee Menchaca MD;  Location: [facility] GI LAB; Service: Gastroenterology    EPIDURAL BLOCK INJECTION Left 9/16/2021    Procedure: L3 L4 transforaminal epidural steroid injection ( [id number]);   Surgeon: Tiny Romberg, MD;  Location: [facility] MAIN OR;  Service: Pain Management     HEMORRHOID SURGERY       Social History   Social History     Substance and Sexual Activity   Alcohol Use Yes    Comment: occasionally      Social History     Substance and Sexual Activity   Drug Use No     Social History     Tobacco Use   Smoking Status Never Smoker   Smokeless Tobacco Never Used     Family History:   Family History   Problem Relation Age of Onset    Heart disease Mother     Cancer Father         stomach    Hypertension Father     Stomach cancer Father     Colon cancer Maternal Aunt        Meds/Allergies   all medications and allergies reviewed  No Known Allergies    Objective:      /84   Pulse 82   Resp 17   Ht 5' 10" (1 778 m)   Wt 118 kg (260 lb)   BMI 37 31 kg/m²          Physical Exam  Constitutional:       General: He is not in acute distress  Appearance: He is well-developed  He is not ill-appearing, toxic-appearing or diaphoretic  HENT:      Head: Normocephalic and atraumatic  Cardiovascular:      Pulses: Normal pulses  Dorsalis pedis pulses are 2+ on the right side and 2+ on the left side  Posterior tibial pulses are 2+ on the right side and 2+ on the left side  Pulmonary:      Effort: No respiratory distress  Musculoskeletal:         General: Normal range of motion  Comments: Pain on palpation along plantar fascia bilateral foot and the medial calcaneal tuberosity  Gastroc equinus bilateral   Feet:      Right foot:      Protective Sensation: 10 sites tested  10 sites sensed  Skin integrity: No ulcer, blister, skin breakdown or erythema  Toenail Condition: Right toenails are abnormally thick, long and ingrown  Fungal disease present  Left foot:      Protective Sensation: 10 sites tested  10 sites sensed  Skin integrity: No ulcer, blister, skin breakdown or erythema  Toenail Condition: Left toenails are abnormally thick, long and ingrown  Fungal disease present  Skin:     Capillary Refill: Capillary refill takes 2 to 3 seconds  Coloration: Skin is not pale  Comments:     Right hallucal toenail with area of discoloration, likely due to mycotic changes    Mycosis noted to all digits with subungual debris   Neurological:      Mental Status: He is alert and oriented to person, place, and time        Deep Tendon Reflexes: Reflexes abnormal       Foot Exam    Right Foot/Ankle     Inspection and Palpation  Skin Exam: no blister, no maceration, no ulcer and no erythema     Neurovascular  Dorsalis pedis: 2+  Posterior tibial: 2+      Left Foot/Ankle      Inspection and Palpation  Skin Exam: no blister, no maceration, no ulcer and no erythema     Neurovascular  Dorsalis pedis: 2+  Posterior tibial: 2+

## 2022-04-28 ENCOUNTER — OFFICE VISIT (OUTPATIENT)
Dept: PODIATRY | Facility: CLINIC | Age: 57
End: 2022-04-28
Payer: COMMERCIAL

## 2022-04-28 VITALS
HEIGHT: 70 IN | BODY MASS INDEX: 37.22 KG/M2 | RESPIRATION RATE: 17 BRPM | SYSTOLIC BLOOD PRESSURE: 146 MMHG | DIASTOLIC BLOOD PRESSURE: 84 MMHG | HEART RATE: 82 BPM | WEIGHT: 260 LBS

## 2022-04-28 DIAGNOSIS — L60.0 INGROWN TOENAIL: ICD-10-CM

## 2022-04-28 DIAGNOSIS — M79.672 PAIN IN BOTH FEET: Primary | ICD-10-CM

## 2022-04-28 DIAGNOSIS — L60.8 DISCOLORATION OF NAIL: ICD-10-CM

## 2022-04-28 DIAGNOSIS — M79.671 PAIN IN BOTH FEET: Primary | ICD-10-CM

## 2022-04-28 DIAGNOSIS — M20.41 HAMMER TOES OF BOTH FEET: ICD-10-CM

## 2022-04-28 DIAGNOSIS — B35.1 ONYCHOMYCOSIS: ICD-10-CM

## 2022-04-28 DIAGNOSIS — M20.42 HAMMER TOES OF BOTH FEET: ICD-10-CM

## 2022-04-28 DIAGNOSIS — M20.41 HAMMER TOE OF RIGHT FOOT: ICD-10-CM

## 2022-04-28 DIAGNOSIS — B35.3 TINEA PEDIS OF BOTH FEET: ICD-10-CM

## 2022-04-28 PROCEDURE — 99212 OFFICE O/P EST SF 10 MIN: CPT | Performed by: PODIATRIST

## 2022-06-23 ENCOUNTER — OFFICE VISIT (OUTPATIENT)
Dept: PODIATRY | Facility: CLINIC | Age: 57
End: 2022-06-23
Payer: COMMERCIAL

## 2022-06-23 VITALS
HEART RATE: 82 BPM | DIASTOLIC BLOOD PRESSURE: 82 MMHG | RESPIRATION RATE: 17 BRPM | WEIGHT: 260 LBS | HEIGHT: 70 IN | SYSTOLIC BLOOD PRESSURE: 146 MMHG | BODY MASS INDEX: 37.22 KG/M2

## 2022-06-23 DIAGNOSIS — B35.1 ONYCHOMYCOSIS: Primary | ICD-10-CM

## 2022-06-23 DIAGNOSIS — M20.42 HAMMER TOES OF BOTH FEET: ICD-10-CM

## 2022-06-23 DIAGNOSIS — M79.671 PAIN IN BOTH FEET: ICD-10-CM

## 2022-06-23 DIAGNOSIS — M79.672 PAIN IN BOTH FEET: ICD-10-CM

## 2022-06-23 DIAGNOSIS — L84 CORNS: ICD-10-CM

## 2022-06-23 DIAGNOSIS — L60.0 INGROWN TOENAIL: ICD-10-CM

## 2022-06-23 DIAGNOSIS — M20.41 HAMMER TOES OF BOTH FEET: ICD-10-CM

## 2022-06-23 PROCEDURE — 99212 OFFICE O/P EST SF 10 MIN: CPT | Performed by: PODIATRIST

## 2022-07-26 NOTE — PROGRESS NOTES
Assessment/Plan:    Educated patient on the etiology of his lesion, patient use OTC onychomycotic management, patient opted for soaking his foot daily , all nails reduced, to patient tolerance, debulked, using a bur, patient return p r n   - discussed the biomechanical etiology of the lesions including the effect of shoe gear, aseptic debridement and paring of a hyperkeratotic lesions at the location described ,  utilizing a sterile instruments was performed removing hyperkeratotic tissue to viable intact skin, patient tolerated the procedure well, patient educated and the use of proper shoe gear and orthotics to alleviate pressure and application of daily moisturizers    Diagnoses and all orders for this visit:    Pain in both feet    Onychomycosis    Discoloration of nail    Hammer toes of both feet    Ingrown toenail    Hammer toe of right foot    Tinea pedis of both feet          Subjective:      Patient ID: Dwyane Favre is a 61 y o  male  Patient returns to the office for discolored right hallucal toenail, with ingrown toenails and thickening of the skin at the bottom of the foot      The following portions of the patient's history were reviewed and updated as appropriate: allergies, current medications, past family history, past medical history, past social history, past surgical history and problem list     Review of Systems   Constitutional: Negative  Respiratory: Negative  Cardiovascular: Negative  Musculoskeletal: Negative  Skin: Negative  Historical Information   Past Medical History:   Diagnosis Date    BPH (benign prostatic hyperplasia)     Hypertension      Past Surgical History:   Procedure Laterality Date    COLONOSCOPY N/A 10/18/2017    Procedure: COLONOSCOPY;  Surgeon: Maria Isabel Ramirez MD;  Location: [facility] GI LAB; Service: Gastroenterology    EPIDURAL BLOCK INJECTION Left 9/16/2021    Procedure: L3 L4 transforaminal epidural steroid injection ( [id number]); Surgeon: Zhanna Blackman MD;  Location: [facility] MAIN OR;  Service: Pain Management     HEMORRHOID SURGERY       Social History   Social History     Substance and Sexual Activity   Alcohol Use Yes    Comment: occasionally      Social History     Substance and Sexual Activity   Drug Use No     Social History     Tobacco Use   Smoking Status Never Smoker   Smokeless Tobacco Never Used     Family History:   Family History   Problem Relation Age of Onset    Heart disease Mother     Cancer Father         stomach    Hypertension Father     Stomach cancer Father     Colon cancer Maternal Aunt        Meds/Allergies   all medications and allergies reviewed  No Known Allergies    Objective:      /84   Pulse 82   Resp 17   Ht 5' 10" (1 778 m)   Wt 118 kg (260 lb)   BMI 37 31 kg/m²          Physical Exam  Constitutional:       General: He is not in acute distress  Appearance: He is well-developed  He is not ill-appearing, toxic-appearing or diaphoretic  HENT:      Head: Normocephalic and atraumatic  Cardiovascular:      Pulses: Normal pulses  Dorsalis pedis pulses are 2+ on the right side and 2+ on the left side  Posterior tibial pulses are 2+ on the right side and 2+ on the left side  Pulmonary:      Effort: No respiratory distress  Musculoskeletal:         General: Normal range of motion  Comments: Pain on palpation along plantar fascia bilateral foot and the medial calcaneal tuberosity  Gastroc equinus bilateral   Feet:      Right foot:      Protective Sensation: 10 sites tested  10 sites sensed  Skin integrity: No ulcer, blister, skin breakdown or erythema  Toenail Condition: Right toenails are abnormally thick, long and ingrown  Fungal disease present  Left foot:      Protective Sensation: 10 sites tested  10 sites sensed  Skin integrity: No ulcer, blister, skin breakdown or erythema        Toenail Condition: Left toenails are abnormally thick, long and ingrown  Fungal disease present  Skin:     Capillary Refill: Capillary refill takes 2 to 3 seconds  Coloration: Skin is not pale  Comments:     Right hallucal toenail with area of discoloration, likely due to mycotic changes    Mycosis noted to all digits with subungual debris   Neurological:      Mental Status: He is alert and oriented to person, place, and time        Deep Tendon Reflexes: Reflexes abnormal       Foot Exam    Right Foot/Ankle     Inspection and Palpation  Skin Exam: no blister, no maceration, no ulcer and no erythema     Neurovascular  Dorsalis pedis: 2+  Posterior tibial: 2+      Left Foot/Ankle      Inspection and Palpation  Skin Exam: no blister, no maceration, no ulcer and no erythema     Neurovascular  Dorsalis pedis: 2+  Posterior tibial: 2+

## 2022-08-18 ENCOUNTER — OFFICE VISIT (OUTPATIENT)
Dept: PODIATRY | Facility: CLINIC | Age: 57
End: 2022-08-18

## 2022-08-18 VITALS
DIASTOLIC BLOOD PRESSURE: 82 MMHG | HEIGHT: 70 IN | RESPIRATION RATE: 17 BRPM | WEIGHT: 260 LBS | BODY MASS INDEX: 37.22 KG/M2 | SYSTOLIC BLOOD PRESSURE: 146 MMHG | HEART RATE: 82 BPM

## 2022-08-18 DIAGNOSIS — M79.671 PAIN IN BOTH FEET: ICD-10-CM

## 2022-08-18 DIAGNOSIS — L60.0 INGROWN TOENAIL: Primary | ICD-10-CM

## 2022-08-18 DIAGNOSIS — M79.672 PAIN IN BOTH FEET: ICD-10-CM

## 2022-08-18 DIAGNOSIS — B35.1 ONYCHOMYCOSIS: ICD-10-CM

## 2022-09-15 NOTE — PROGRESS NOTES
Assessment/Plan:    Educated patient on the etiology of his lesion, patient use OTC onychomycotic management, patient opted for soaking his foot daily , all nails reduced, to patient tolerance, debulked, using a bur, patient return p r n   - discussed the biomechanical etiology of the lesions including the effect of shoe gear, aseptic debridement and paring of a hyperkeratotic lesions at the location described ,  utilizing a sterile instruments was performed removing hyperkeratotic tissue to viable intact skin, patient tolerated the procedure well, patient educated and the use of proper shoe gear and orthotics to alleviate pressure and application of daily moisturizers    Diagnoses and all orders for this visit:    Onychomycosis    Pain in both feet    Ingrown toenail    Hammer toes of both feet    Corns          Subjective:      Patient ID: Abel Boothe is a 61 y o  male  Patient returns to the office for discolored right hallucal toenail, with ingrown toenails and thickening of the skin at the bottom of the foot      The following portions of the patient's history were reviewed and updated as appropriate: allergies, current medications, past family history, past medical history, past social history, past surgical history and problem list     Review of Systems   Constitutional: Negative  Respiratory: Negative  Cardiovascular: Negative  Musculoskeletal: Negative  Skin: Negative  Historical Information   Past Medical History:   Diagnosis Date    BPH (benign prostatic hyperplasia)     Hypertension      Past Surgical History:   Procedure Laterality Date    COLONOSCOPY N/A 10/18/2017    Procedure: COLONOSCOPY;  Surgeon: Gael Haro MD;  Location: [place] [address] GI LAB; Service: Gastroenterology    EPIDURAL BLOCK INJECTION Left 9/16/2021    Procedure: L3 L4 transforaminal epidural steroid injection ( [id number]);   Surgeon: Leonardo Handley MD;  Location: [facility] MAIN OR;  Service: Pain Management     HEMORRHOID SURGERY       Social History   Social History     Substance and Sexual Activity   Alcohol Use Yes    Comment: occasionally      Social History     Substance and Sexual Activity   Drug Use No     Social History     Tobacco Use   Smoking Status Never Smoker   Smokeless Tobacco Never Used     Family History:   Family History   Problem Relation Age of Onset    Heart disease Mother     Cancer Father         stomach    Hypertension Father     Stomach cancer Father     Colon cancer Maternal Aunt        Meds/Allergies   all medications and allergies reviewed  No Known Allergies    Objective:      /82   Pulse 82   Resp 17   Ht 5' 10" (1 778 m)   Wt 118 kg (260 lb)   BMI 37 31 kg/m²          Physical Exam  Constitutional:       General: He is not in acute distress  Appearance: He is well-developed  He is not ill-appearing, toxic-appearing or diaphoretic  HENT:      Head: Normocephalic and atraumatic  Cardiovascular:      Pulses: Normal pulses  Dorsalis pedis pulses are 2+ on the right side and 2+ on the left side  Posterior tibial pulses are 2+ on the right side and 2+ on the left side  Pulmonary:      Effort: No respiratory distress  Musculoskeletal:         General: Normal range of motion  Comments: Pain on palpation along plantar fascia bilateral foot and the medial calcaneal tuberosity  Gastroc equinus bilateral   Feet:      Right foot:      Protective Sensation: 10 sites tested  10 sites sensed  Skin integrity: No ulcer, blister, skin breakdown or erythema  Toenail Condition: Right toenails are abnormally thick, long and ingrown  Fungal disease present  Left foot:      Protective Sensation: 10 sites tested  10 sites sensed  Skin integrity: No ulcer, blister, skin breakdown or erythema  Toenail Condition: Left toenails are abnormally thick, long and ingrown  Fungal disease present    Skin:     Capillary Refill: Capillary refill takes 2 to 3 seconds  Coloration: Skin is not pale  Comments:     Right hallucal toenail with area of discoloration, likely due to mycotic changes    Mycosis noted to all digits with subungual debris   Neurological:      Mental Status: He is alert and oriented to person, place, and time        Deep Tendon Reflexes: Reflexes abnormal       Foot Exam    Right Foot/Ankle     Inspection and Palpation  Skin Exam: no blister, no maceration, no ulcer and no erythema     Neurovascular  Dorsalis pedis: 2+  Posterior tibial: 2+      Left Foot/Ankle      Inspection and Palpation  Skin Exam: no blister, no maceration, no ulcer and no erythema     Neurovascular  Dorsalis pedis: 2+  Posterior tibial: 2+

## 2022-09-16 ENCOUNTER — OFFICE VISIT (OUTPATIENT)
Dept: GASTROENTEROLOGY | Facility: CLINIC | Age: 57
End: 2022-09-16
Payer: COMMERCIAL

## 2022-09-16 VITALS
BODY MASS INDEX: 36.36 KG/M2 | DIASTOLIC BLOOD PRESSURE: 89 MMHG | OXYGEN SATURATION: 97 % | HEART RATE: 89 BPM | WEIGHT: 254 LBS | HEIGHT: 70 IN | SYSTOLIC BLOOD PRESSURE: 140 MMHG

## 2022-09-16 DIAGNOSIS — K64.1 GRADE II HEMORRHOIDS: ICD-10-CM

## 2022-09-16 DIAGNOSIS — K92.1 BLOOD IN STOOL: Primary | ICD-10-CM

## 2022-09-16 PROCEDURE — 99204 OFFICE O/P NEW MOD 45 MIN: CPT | Performed by: INTERNAL MEDICINE

## 2022-09-16 RX ORDER — AMOXICILLIN 500 MG/1
CAPSULE ORAL
COMMUNITY
Start: 2022-08-31 | End: 2022-10-18 | Stop reason: ALTCHOICE

## 2022-09-16 RX ORDER — POLYETHYLENE GLYCOL 3350, SODIUM SULFATE ANHYDROUS, SODIUM BICARBONATE, SODIUM CHLORIDE, POTASSIUM CHLORIDE 236; 22.74; 6.74; 5.86; 2.97 G/4L; G/4L; G/4L; G/4L; G/4L
4 POWDER, FOR SOLUTION ORAL ONCE
Qty: 4000 ML | Refills: 0 | Status: SHIPPED | OUTPATIENT
Start: 2022-09-16 | End: 2022-10-11

## 2022-09-16 RX ORDER — ACETAMINOPHEN AND CODEINE PHOSPHATE 300; 30 MG/1; MG/1
TABLET ORAL
COMMUNITY
Start: 2022-08-31 | End: 2022-10-18 | Stop reason: ALTCHOICE

## 2022-09-16 NOTE — H&P (VIEW-ONLY)
[facility] [address] Consultation  Wellington Job 61 y o  male MRN: [id number]  Encounter: [id number]          ASSESSMENT AND PLAN:      1  Blood in stool  -     Colonoscopy; Future; Expected date: 09/16/2022  -     polyethylene glycol (Golytely) 4000 mL solution; Take 4,000 mL by mouth once for 1 dose Take according to instructions given by the office for colonoscopy bowel prep  2  Grade II hemorrhoids      History of blood in stools hemorrhoids, last colonoscopy more than 5 years ago, due for 5 year follow-up colonoscopy per patient  Procedure prep discussed at length, schedule in next few weeks  Many questions answered  ______________________________________________________________________    HPI:       Patient came in for evaluation of his history of hemorrhoids, occasional blood in stools, had banding done by Dr Kyle Chacon a month ago  Personal history of colon polyps many years ago, denies any abdominal pain nausea vomiting dysphagia coughing choking spells chest pain shortness of breath nocturnal reflux regurgitation bronchitis pneumonias psych ENT  problems  Diet medications more than 10 systems reviewed, lives alone  Denies any family history of colorectal cancer  REVIEW OF SYSTEMS:    CONSTITUTIONAL: Denies any fever, chills, rigors, and weight loss  HEENT: No earache or tinnitus  CARDIOVASCULAR: No chest pain or palpitations  RESPIRATORY: Denies any cough, hemoptysis, shortness of breath or dyspnea on exertion  GASTROINTESTINAL: As noted in the History of Present Illness  GENITOURINARY: Denies any hematuria or dysuria  NEUROLOGIC: No dizziness or vertigo  MUSCULOSKELETAL: Denies any joint swellings  SKIN: Denies skin rashes or itching  ENDOCRINE: Denies excessive thirst  Denies intolerance to heat or cold  PSYCHOSOCIAL: Denies depression or anxiety  Denies any recent memory loss         Historical Information   Past Medical History: Diagnosis Date   • BPH (benign prostatic hyperplasia)    • Hypertension      Past Surgical History:   Procedure Laterality Date   • COLONOSCOPY N/A 10/18/2017    Procedure: COLONOSCOPY;  Surgeon: Martha Dailey MD;  Location: [facility] GI LAB; Service: Gastroenterology   • EPIDURAL BLOCK INJECTION Left 9/16/2021    Procedure: L3 L4 transforaminal epidural steroid injection ( [id number]);   Surgeon: Tricia Joyner MD;  Location: [facility] MAIN OR;  Service: Pain Management    • HEMORRHOID SURGERY       Social History   Social History     Substance and Sexual Activity   Alcohol Use Not Currently     Social History     Substance and Sexual Activity   Drug Use No     Social History     Tobacco Use   Smoking Status Never Smoker   Smokeless Tobacco Never Used     Family History   Problem Relation Age of Onset   • Heart disease Mother    • Cancer Father         stomach   • Hypertension Father    • Stomach cancer Father    • Colon cancer Maternal Aunt        Meds/Allergies       Current Outpatient Medications:   •  acetaminophen-codeine (TYLENOL with CODEINE #3) 300-30 MG per tablet  •  atorvastatin (LIPITOR) 10 mg tablet  •  cholecalciferol (VITAMIN D3) 1,000 units tablet  •  doxazosin (CARDURA) 4 mg tablet  •  polyethylene glycol (Golytely) 4000 mL solution  •  tamsulosin (FLOMAX) 0 4 mg  •  amoxicillin (AMOXIL) 500 mg capsule  •  azithromycin (ZITHROMAX) 250 mg tablet  •  Cholecalciferol (VITAMIN D3) 40529 units CAPS  •  cyclobenzaprine (FLEXERIL) 10 mg tablet  •  diclofenac sodium (VOLTAREN) 1 %  •  Flurandrenolide (Cordran) 4 MCG/SQCM TAPE  •  HYDROcodone-acetaminophen (NORCO) 5-325 mg per tablet  •  hydrOXYzine HCL (ATARAX) 25 mg tablet  •  ketoconazole (NIZORAL) 2 % cream  •  meloxicam (MOBIC) 7 5 mg tablet  •  traMADol (ULTRAM) 50 mg tablet  •  triamcinolone (KENALOG) 0 5 % cream  •  Wound Dressings (Medihoney Wound/Burn Dressing) GEL  •  Wound Dressings (Medihoney Wound/Burn Dressing) GEL    No Known Allergies        Objective     Blood pressure 140/89, pulse 89, height 5' 10" (1 778 m), weight 115 kg (254 lb), SpO2 97 %  Body mass index is 36 45 kg/m²  PHYSICAL EXAM:      General Appearance:   Alert, cooperative, no distress   HEENT:   Normocephalic, atraumatic, anicteric  Neck:  Supple, symmetrical, trachea midline   Lungs:   Clear to auscultation bilaterally; no rales, rhonchi or wheezing; respirations unlabored    Heart[de-identified]   Regular rate and rhythm; no murmur  Abdomen:   Soft, non-tender, non-distended; normal bowel sounds; no masses, no organomegaly    Genitalia:   Deferred    Rectal:   Deferred    Extremities:  No cyanosis, clubbing or edema    Skin:  No jaundice, rashes, or lesions    Lymph nodes:  No palpable cervical lymphadenopathy        Lab Results:   No visits with results within 1 Day(s) from this visit  Latest known visit with results is:   No results found for any previous visit  Radiology Results:   No results found

## 2022-09-16 NOTE — PROGRESS NOTES
[facility] [address] Consultation  Idelle Lesch 61 y o  male MRN: [id number]  Encounter: [id number]          ASSESSMENT AND PLAN:      1  Blood in stool  -     Colonoscopy; Future; Expected date: 09/16/2022  -     polyethylene glycol (Golytely) 4000 mL solution; Take 4,000 mL by mouth once for 1 dose Take according to instructions given by the office for colonoscopy bowel prep  2  Grade II hemorrhoids      History of blood in stools hemorrhoids, last colonoscopy more than 5 years ago, due for 5 year follow-up colonoscopy per patient  Procedure prep discussed at length, schedule in next few weeks  Many questions answered  ______________________________________________________________________    HPI:       Patient came in for evaluation of his history of hemorrhoids, occasional blood in stools, had banding done by Dr Marina Jones a month ago  Personal history of colon polyps many years ago, denies any abdominal pain nausea vomiting dysphagia coughing choking spells chest pain shortness of breath nocturnal reflux regurgitation bronchitis pneumonias psych ENT  problems  Diet medications more than 10 systems reviewed, lives alone  Denies any family history of colorectal cancer  REVIEW OF SYSTEMS:    CONSTITUTIONAL: Denies any fever, chills, rigors, and weight loss  HEENT: No earache or tinnitus  CARDIOVASCULAR: No chest pain or palpitations  RESPIRATORY: Denies any cough, hemoptysis, shortness of breath or dyspnea on exertion  GASTROINTESTINAL: As noted in the History of Present Illness  GENITOURINARY: Denies any hematuria or dysuria  NEUROLOGIC: No dizziness or vertigo  MUSCULOSKELETAL: Denies any joint swellings  SKIN: Denies skin rashes or itching  ENDOCRINE: Denies excessive thirst  Denies intolerance to heat or cold  PSYCHOSOCIAL: Denies depression or anxiety  Denies any recent memory loss         Historical Information   Past Medical History: Diagnosis Date    BPH (benign prostatic hyperplasia)     Hypertension      Past Surgical History:   Procedure Laterality Date    COLONOSCOPY N/A 10/18/2017    Procedure: COLONOSCOPY;  Surgeon: Elia Waller MD;  Location: [place] [address] GI LAB; Service: Gastroenterology    EPIDURAL BLOCK INJECTION Left 9/16/2021    Procedure: L3 L4 transforaminal epidural steroid injection ( [id number]);   Surgeon: Jinny Dupree MD;  Location: [facility] MAIN OR;  Service: Pain Management     HEMORRHOID SURGERY       Social History   Social History     Substance and Sexual Activity   Alcohol Use Not Currently     Social History     Substance and Sexual Activity   Drug Use No     Social History     Tobacco Use   Smoking Status Never Smoker   Smokeless Tobacco Never Used     Family History   Problem Relation Age of Onset    Heart disease Mother     Cancer Father         stomach    Hypertension Father     Stomach cancer Father     Colon cancer Maternal Aunt        Meds/Allergies       Current Outpatient Medications:     acetaminophen-codeine (TYLENOL with CODEINE #3) 300-30 MG per tablet    atorvastatin (LIPITOR) 10 mg tablet    cholecalciferol (VITAMIN D3) 1,000 units tablet    doxazosin (CARDURA) 4 mg tablet    polyethylene glycol (Golytely) 4000 mL solution    tamsulosin (FLOMAX) 0 4 mg    amoxicillin (AMOXIL) 500 mg capsule    azithromycin (ZITHROMAX) 250 mg tablet    Cholecalciferol (VITAMIN D3) 63120 units CAPS    cyclobenzaprine (FLEXERIL) 10 mg tablet    diclofenac sodium (VOLTAREN) 1 %    Flurandrenolide (Cordran) 4 MCG/SQCM TAPE    HYDROcodone-acetaminophen (NORCO) 5-325 mg per tablet    hydrOXYzine HCL (ATARAX) 25 mg tablet    ketoconazole (NIZORAL) 2 % cream    meloxicam (MOBIC) 7 5 mg tablet    traMADol (ULTRAM) 50 mg tablet    triamcinolone (KENALOG) 0 5 % cream    Wound Dressings (Medihoney Wound/Burn Dressing) GEL    Wound Dressings (Medihoney Wound/Burn Dressing) GEL    No Known Allergies        Objective     Blood pressure 140/89, pulse 89, height 5' 10" (1 778 m), weight 115 kg (254 lb), SpO2 97 %  Body mass index is 36 45 kg/m²  PHYSICAL EXAM:      General Appearance:   Alert, cooperative, no distress   HEENT:   Normocephalic, atraumatic, anicteric  Neck:  Supple, symmetrical, trachea midline   Lungs:   Clear to auscultation bilaterally; no rales, rhonchi or wheezing; respirations unlabored    Heart[de-identified]   Regular rate and rhythm; no murmur  Abdomen:   Soft, non-tender, non-distended; normal bowel sounds; no masses, no organomegaly    Genitalia:   Deferred    Rectal:   Deferred    Extremities:  No cyanosis, clubbing or edema    Skin:  No jaundice, rashes, or lesions    Lymph nodes:  No palpable cervical lymphadenopathy        Lab Results:   No visits with results within 1 Day(s) from this visit  Latest known visit with results is:   No results found for any previous visit  Radiology Results:   No results found

## 2022-10-11 ENCOUNTER — ANESTHESIA (OUTPATIENT)
Dept: GASTROENTEROLOGY | Facility: AMBULARY SURGERY CENTER | Age: 57
End: 2022-10-11

## 2022-10-11 ENCOUNTER — ANESTHESIA EVENT (OUTPATIENT)
Dept: GASTROENTEROLOGY | Facility: AMBULARY SURGERY CENTER | Age: 57
End: 2022-10-11

## 2022-10-11 ENCOUNTER — HOSPITAL ENCOUNTER (OUTPATIENT)
Dept: GASTROENTEROLOGY | Facility: AMBULARY SURGERY CENTER | Age: 57
Setting detail: OUTPATIENT SURGERY
Discharge: HOME | End: 2022-10-11
Attending: INTERNAL MEDICINE
Payer: COMMERCIAL

## 2022-10-11 VITALS
RESPIRATION RATE: 18 BRPM | HEIGHT: 70 IN | DIASTOLIC BLOOD PRESSURE: 87 MMHG | OXYGEN SATURATION: 98 % | TEMPERATURE: 98.4 F | SYSTOLIC BLOOD PRESSURE: 152 MMHG | WEIGHT: 254 LBS | BODY MASS INDEX: 36.36 KG/M2 | HEART RATE: 61 BPM

## 2022-10-11 DIAGNOSIS — K92.1 BLOOD IN STOOL: ICD-10-CM

## 2022-10-11 PROBLEM — I10 HTN (HYPERTENSION): Status: ACTIVE | Noted: 2022-10-11

## 2022-10-11 PROCEDURE — 45380 COLONOSCOPY AND BIOPSY: CPT | Performed by: INTERNAL MEDICINE

## 2022-10-11 PROCEDURE — 88305 TISSUE EXAM BY PATHOLOGIST: CPT | Performed by: PATHOLOGY

## 2022-10-11 PROCEDURE — 45385 COLONOSCOPY W/LESION REMOVAL: CPT | Performed by: INTERNAL MEDICINE

## 2022-10-11 RX ORDER — PROPOFOL 10 MG/ML
INJECTION, EMULSION INTRAVENOUS AS NEEDED
Status: DISCONTINUED | OUTPATIENT
Start: 2022-10-11 | End: 2022-10-11

## 2022-10-11 RX ORDER — SODIUM CHLORIDE, SODIUM LACTATE, POTASSIUM CHLORIDE, CALCIUM CHLORIDE 600; 310; 30; 20 MG/100ML; MG/100ML; MG/100ML; MG/100ML
125 INJECTION, SOLUTION INTRAVENOUS CONTINUOUS
Status: DISCONTINUED | OUTPATIENT
Start: 2022-10-11 | End: 2022-10-15 | Stop reason: HOSPADM

## 2022-10-11 RX ORDER — SODIUM CHLORIDE, SODIUM LACTATE, POTASSIUM CHLORIDE, CALCIUM CHLORIDE 600; 310; 30; 20 MG/100ML; MG/100ML; MG/100ML; MG/100ML
INJECTION, SOLUTION INTRAVENOUS CONTINUOUS PRN
Status: DISCONTINUED | OUTPATIENT
Start: 2022-10-11 | End: 2022-10-11

## 2022-10-11 RX ORDER — LIDOCAINE HYDROCHLORIDE 10 MG/ML
INJECTION, SOLUTION EPIDURAL; INFILTRATION; INTRACAUDAL; PERINEURAL AS NEEDED
Status: DISCONTINUED | OUTPATIENT
Start: 2022-10-11 | End: 2022-10-11

## 2022-10-11 RX ORDER — PROPOFOL 10 MG/ML
INJECTION, EMULSION INTRAVENOUS CONTINUOUS PRN
Status: DISCONTINUED | OUTPATIENT
Start: 2022-10-11 | End: 2022-10-11

## 2022-10-11 RX ADMIN — PROPOFOL 50 MG: 10 INJECTION, EMULSION INTRAVENOUS at 11:27

## 2022-10-11 RX ADMIN — SODIUM CHLORIDE, SODIUM LACTATE, POTASSIUM CHLORIDE, AND CALCIUM CHLORIDE: .6; .31; .03; .02 INJECTION, SOLUTION INTRAVENOUS at 10:04

## 2022-10-11 RX ADMIN — PROPOFOL 50 MG: 10 INJECTION, EMULSION INTRAVENOUS at 11:23

## 2022-10-11 RX ADMIN — PROPOFOL 50 MG: 10 INJECTION, EMULSION INTRAVENOUS at 11:25

## 2022-10-11 RX ADMIN — PROPOFOL 100 MG: 10 INJECTION, EMULSION INTRAVENOUS at 11:20

## 2022-10-11 RX ADMIN — PROPOFOL 50 MG: 10 INJECTION, EMULSION INTRAVENOUS at 11:34

## 2022-10-11 RX ADMIN — PROPOFOL 30 MG: 10 INJECTION, EMULSION INTRAVENOUS at 11:21

## 2022-10-11 RX ADMIN — SODIUM CHLORIDE, SODIUM LACTATE, POTASSIUM CHLORIDE, AND CALCIUM CHLORIDE 125 ML/HR: .6; .31; .03; .02 INJECTION, SOLUTION INTRAVENOUS at 10:44

## 2022-10-11 RX ADMIN — LIDOCAINE HYDROCHLORIDE 50 MG: 10 INJECTION, SOLUTION EPIDURAL; INFILTRATION; INTRACAUDAL; PERINEURAL at 11:20

## 2022-10-11 NOTE — ANESTHESIA PREPROCEDURE EVALUATION
Procedure:  COLONOSCOPY    Relevant Problems   CARDIO   (+) HTN (hypertension)        Physical Exam    Airway    Mallampati score: II  TM Distance: >3 FB  Neck ROM: full     Dental   No notable dental hx     Cardiovascular  Cardiovascular exam normal    Pulmonary  Pulmonary exam normal     Other Findings        Anesthesia Plan  ASA Score- 2     Anesthesia Type- IV sedation with anesthesia with ASA Monitors  Additional Monitors:   Airway Plan:           Plan Factors-Exercise tolerance (METS): >4 METS  Chart reviewed  Imaging results reviewed  Existing labs reviewed  Patient summary reviewed  Patient is not a current smoker  Obstructive sleep apnea risk education given perioperatively  Induction-     Postoperative Plan-     Informed Consent- Anesthetic plan and risks discussed with patient  I personally reviewed this patient with the CRNA  Discussed and agreed on the Anesthesia Plan with the CRNA  Prateek Olson

## 2022-10-11 NOTE — INTERVAL H&P NOTE
H&P reviewed  After examining the patient I find no changes in the patients condition since the H&P had been written      Vitals:    10/11/22 1016   BP: 164/88   Pulse: 71   Resp: 18   Temp: 98 4 °F (36 9 °C)   SpO2: 98%

## 2022-10-12 PROBLEM — H61.23 BILATERAL IMPACTED CERUMEN: Status: RESOLVED | Noted: 2021-05-26 | Resolved: 2022-10-12

## 2022-10-18 ENCOUNTER — OFFICE VISIT (OUTPATIENT)
Dept: FAMILY MEDICINE CLINIC | Facility: CLINIC | Age: 57
End: 2022-10-18
Payer: COMMERCIAL

## 2022-10-18 VITALS
WEIGHT: 251 LBS | BODY MASS INDEX: 35.93 KG/M2 | SYSTOLIC BLOOD PRESSURE: 124 MMHG | HEIGHT: 70 IN | OXYGEN SATURATION: 97 % | HEART RATE: 85 BPM | DIASTOLIC BLOOD PRESSURE: 78 MMHG

## 2022-10-18 DIAGNOSIS — E66.09 CLASS 2 OBESITY DUE TO EXCESS CALORIES WITHOUT SERIOUS COMORBIDITY WITH BODY MASS INDEX (BMI) OF 36.0 TO 36.9 IN ADULT: ICD-10-CM

## 2022-10-18 DIAGNOSIS — Z13.29 SCREENING FOR THYROID DISORDER: ICD-10-CM

## 2022-10-18 DIAGNOSIS — E78.5 DYSLIPIDEMIA: ICD-10-CM

## 2022-10-18 DIAGNOSIS — Z13.0 SCREENING FOR DEFICIENCY ANEMIA: ICD-10-CM

## 2022-10-18 DIAGNOSIS — Z23 ENCOUNTER FOR IMMUNIZATION: Primary | ICD-10-CM

## 2022-10-18 DIAGNOSIS — N40.0 BENIGN PROSTATIC HYPERPLASIA WITHOUT LOWER URINARY TRACT SYMPTOMS: ICD-10-CM

## 2022-10-18 DIAGNOSIS — R73.03 PRE-DIABETES: ICD-10-CM

## 2022-10-18 PROBLEM — E66.812 CLASS 2 OBESITY IN ADULT: Status: ACTIVE | Noted: 2022-10-18

## 2022-10-18 PROBLEM — E66.9 CLASS 2 OBESITY IN ADULT: Status: ACTIVE | Noted: 2022-10-18

## 2022-10-18 PROCEDURE — 99214 OFFICE O/P EST MOD 30 MIN: CPT | Performed by: INTERNAL MEDICINE

## 2022-10-18 PROCEDURE — 90471 IMMUNIZATION ADMIN: CPT | Performed by: INTERNAL MEDICINE

## 2022-10-18 PROCEDURE — 90682 RIV4 VACC RECOMBINANT DNA IM: CPT | Performed by: INTERNAL MEDICINE

## 2022-10-18 NOTE — ASSESSMENT & PLAN NOTE
Patient with dyslipidemia last cholesterol level is at 154 head HDL is 39 LDL is 83 currently taking atorvastatin 10 mg at bedtime will continue with the same and follow up with repeat lab

## 2022-10-18 NOTE — ASSESSMENT & PLAN NOTE
Patient with a history of BPH currently taking Cardura and tamsulosin with good results  Will continue with the same

## 2022-10-18 NOTE — PROGRESS NOTES
Office Visit Note  10/18/22     Bri Olson 61 y o  male MRN: [id number]  : 1965    Assessment:     1  Encounter for immunization  -     influenza vaccine, quadrivalent, recombinant, PF, 0 5 mL    2  Dyslipidemia  Assessment & Plan:  Patient with dyslipidemia last cholesterol level is at 154 head HDL is 39 LDL is 83 currently taking atorvastatin 10 mg at bedtime will continue with the same and follow up with repeat lab  3  Benign prostatic hyperplasia without lower urinary tract symptoms  Assessment & Plan:  Patient with a history of BPH currently taking Cardura and tamsulosin with good results  Will continue with the same  4  Class 2 obesity due to excess calories without serious comorbidity with body mass index (BMI) of 36 0 to 36 9 in adult  Assessment & Plan:  Patient's BMI is 36 01   Recommend patient to watch the diet with calorie intake and lose weight  Discussion Summary and Plan: Today's care plan and medications were reviewed with patient in detail and all their questions answered to their satisfaction  Chief Complaint   Patient presents with   • Follow-up     F/U and flu shot      Subjective:  Patient has come in here for evaluation regarding his hypercholesterolemia  He recently had a colonoscopy on 1 small polyp was removed  He also has a history of BPH and is being followed by the urologist currently taking Cardura and tamsulosin  Medications reviewed  The following portions of the patient's history were reviewed and updated as appropriate: allergies, current medications, past family history, past medical history, past social history, past surgical history and problem list     Review of Systems   Constitutional: Negative for chills and fever  HENT: Negative for ear pain and sore throat  Eyes: Negative for pain and visual disturbance  Respiratory: Negative for cough and shortness of breath  Cardiovascular: Negative for chest pain and palpitations  Gastrointestinal: Negative for abdominal pain and vomiting  Genitourinary: Negative for dysuria and hematuria  Musculoskeletal: Negative for arthralgias and back pain  Skin: Negative for color change and rash  Neurological: Negative for seizures and syncope  All other systems reviewed and are negative  Historical Information   Patient Active Problem List   Diagnosis   • Plantar fasciitis   • Pain in both feet   • Congenital pes planus of right foot   • Congenital pes planus of left foot   • Tinea pedis of both feet   • Dark stools   • Grade II hemorrhoids   • HTN (hypertension)   • Dyslipidemia   • Benign prostatic hyperplasia without lower urinary tract symptoms   • Class 2 obesity in adult     Past Medical History:   Diagnosis Date   • BPH (benign prostatic hyperplasia)    • Hypertension      Past Surgical History:   Procedure Laterality Date   • COLONOSCOPY N/A 10/18/2017    Procedure: COLONOSCOPY;  Surgeon: Karissa Hayden MD;  Location: [facility] GI LAB; Service: Gastroenterology   • EPIDURAL BLOCK INJECTION Left 9/16/2021    Procedure: L3 L4 transforaminal epidural steroid injection ( [id number]);   Surgeon: Vishal Wagner MD;  Location: [facility] MAIN OR;  Service: Pain Management    • HEMORRHOID SURGERY       Social History     Substance and Sexual Activity   Alcohol Use Not Currently     Social History     Substance and Sexual Activity   Drug Use No     Social History     Tobacco Use   Smoking Status Never Smoker   Smokeless Tobacco Never Used     Family History   Problem Relation Age of Onset   • Heart disease Mother    • Cancer Father         stomach   • Hypertension Father    • Stomach cancer Father    • Colon cancer Maternal Aunt      Health Maintenance Due   Topic   • Hepatitis C Screening    • Depression Screening    • HIV Screening    • BMI: Followup Plan    • Annual Physical    • DTaP,Tdap,and Td Vaccines (1 - Tdap)   • COVID-19 Vaccine (3 - Booster for Boswell Peter series) Meds/Allergies       Current Outpatient Medications:   •  atorvastatin (LIPITOR) 10 mg tablet, TAKE ONE TABLET DAILY AT BEDTIME AS DIRECTED, Disp: 30 tablet, Rfl: 1  •  cholecalciferol (VITAMIN D3) 1,000 units tablet, Take 1,000 Units by mouth daily, Disp: , Rfl:   •  doxazosin (CARDURA) 4 mg tablet, Take 4 mg by mouth daily at bedtime, Disp: , Rfl:   •  tamsulosin (FLOMAX) 0 4 mg, Take 0 4 mg by mouth daily with dinner, Disp: , Rfl:       Objective:    Vitals:   /78 (BP Location: Right arm, Patient Position: Sitting, Cuff Size: Standard)   Pulse 85   Ht 5' 10" (1 778 m)   Wt 114 kg (251 lb)   SpO2 97%   BMI 36 01 kg/m²   Body mass index is 36 01 kg/m²  Vitals:    10/18/22 1612   Weight: 114 kg (251 lb)       Physical Exam  Vitals and nursing note reviewed  Constitutional:       Appearance: Normal appearance  He is obese  Cardiovascular:      Rate and Rhythm: Normal rate and regular rhythm  Heart sounds: Normal heart sounds  Pulmonary:      Effort: Pulmonary effort is normal       Breath sounds: Normal breath sounds  Abdominal:      General: Abdomen is flat  Palpations: Abdomen is soft  Musculoskeletal:      Right lower leg: No edema  Left lower leg: No edema  Neurological:      Mental Status: He is alert and oriented to person, place, and time  Lab Review   No visits with results within 2 Month(s) from this visit  Latest known visit with results is:   No results found for any previous visit  Yao Zepeda        "This note has been constructed using a voice recognition system  Therefore there may be syntax, spelling, and/or grammatical errors   Please call if you have any questions  "

## 2022-10-20 ENCOUNTER — OFFICE VISIT (OUTPATIENT)
Dept: PODIATRY | Facility: CLINIC | Age: 57
End: 2022-10-20
Payer: COMMERCIAL

## 2022-10-20 VITALS
RESPIRATION RATE: 17 BRPM | SYSTOLIC BLOOD PRESSURE: 124 MMHG | HEIGHT: 70 IN | DIASTOLIC BLOOD PRESSURE: 78 MMHG | WEIGHT: 251 LBS | BODY MASS INDEX: 35.93 KG/M2

## 2022-10-20 DIAGNOSIS — M21.42 ACQUIRED FLAT FOOT, LEFT: ICD-10-CM

## 2022-10-20 DIAGNOSIS — B35.1 ONYCHOMYCOSIS: ICD-10-CM

## 2022-10-20 DIAGNOSIS — M20.41 HAMMER TOES OF BOTH FEET: ICD-10-CM

## 2022-10-20 DIAGNOSIS — M21.961 ACQUIRED DEFORMITY OF RIGHT FOOT: Primary | ICD-10-CM

## 2022-10-20 DIAGNOSIS — M20.42 HAMMER TOES OF BOTH FEET: ICD-10-CM

## 2022-10-20 DIAGNOSIS — M21.41 ACQUIRED FLAT FOOT, RIGHT: ICD-10-CM

## 2022-10-20 DIAGNOSIS — M21.962 ACQUIRED DEFORMITY OF LEFT FOOT: ICD-10-CM

## 2022-10-20 LAB
ALBUMIN SERPL-MCNC: 4.8 G/DL (ref 3.8–4.9)
ALBUMIN/GLOB SERPL: 2.3 {RATIO} (ref 1.2–2.2)
ALP SERPL-CCNC: 99 IU/L (ref 44–121)
ALT SERPL-CCNC: 20 IU/L (ref 0–44)
AST SERPL-CCNC: 13 IU/L (ref 0–40)
BASOPHILS # BLD AUTO: 0.1 X10E3/UL (ref 0–0.2)
BASOPHILS NFR BLD AUTO: 1 %
BILIRUB SERPL-MCNC: 0.6 MG/DL (ref 0–1.2)
BUN SERPL-MCNC: 14 MG/DL (ref 6–24)
BUN/CREAT SERPL: 13 (ref 9–20)
CALCIUM SERPL-MCNC: 9.3 MG/DL (ref 8.7–10.2)
CHLORIDE SERPL-SCNC: 105 MMOL/L (ref 96–106)
CHOLEST SERPL-MCNC: 142 MG/DL (ref 100–199)
CHOLEST/HDLC SERPL: 3.9 RATIO (ref 0–5)
CO2 SERPL-SCNC: 23 MMOL/L (ref 20–29)
CREAT SERPL-MCNC: 1.06 MG/DL (ref 0.76–1.27)
EGFR: 81 ML/MIN/1.73
EOSINOPHIL # BLD AUTO: 0.2 X10E3/UL (ref 0–0.4)
EOSINOPHIL NFR BLD AUTO: 3 %
ERYTHROCYTE [DISTWIDTH] IN BLOOD BY AUTOMATED COUNT: 12.7 % (ref 11.6–15.4)
EST. AVERAGE GLUCOSE BLD GHB EST-MCNC: 108 MG/DL
GLOBULIN SER-MCNC: 2.1 G/DL (ref 1.5–4.5)
GLUCOSE SERPL-MCNC: 96 MG/DL (ref 70–99)
HBA1C MFR BLD: 5.4 % (ref 4.8–5.6)
HCT VFR BLD AUTO: 42.5 % (ref 37.5–51)
HDLC SERPL-MCNC: 36 MG/DL
HGB BLD-MCNC: 15.1 G/DL (ref 13–17.7)
IMM GRANULOCYTES # BLD: 0 X10E3/UL (ref 0–0.1)
IMM GRANULOCYTES NFR BLD: 0 %
LDLC SERPL CALC-MCNC: 90 MG/DL (ref 0–99)
LYMPHOCYTES # BLD AUTO: 1.5 X10E3/UL (ref 0.7–3.1)
LYMPHOCYTES NFR BLD AUTO: 21 %
MCH RBC QN AUTO: 30.6 PG (ref 26.6–33)
MCHC RBC AUTO-ENTMCNC: 35.5 G/DL (ref 31.5–35.7)
MCV RBC AUTO: 86 FL (ref 79–97)
MONOCYTES # BLD AUTO: 0.5 X10E3/UL (ref 0.1–0.9)
MONOCYTES NFR BLD AUTO: 7 %
NEUTROPHILS # BLD AUTO: 4.7 X10E3/UL (ref 1.4–7)
NEUTROPHILS NFR BLD AUTO: 68 %
PLATELET # BLD AUTO: 188 X10E3/UL (ref 150–450)
POTASSIUM SERPL-SCNC: 4 MMOL/L (ref 3.5–5.2)
PROT SERPL-MCNC: 6.9 G/DL (ref 6–8.5)
RBC # BLD AUTO: 4.94 X10E6/UL (ref 4.14–5.8)
SL AMB VLDL CHOLESTEROL CALC: 16 MG/DL (ref 5–40)
SODIUM SERPL-SCNC: 142 MMOL/L (ref 134–144)
TRIGL SERPL-MCNC: 80 MG/DL (ref 0–149)
TSH SERPL DL<=0.005 MIU/L-ACNC: 1.9 UIU/ML (ref 0.45–4.5)
WBC # BLD AUTO: 6.9 X10E3/UL (ref 3.4–10.8)

## 2022-10-20 PROCEDURE — L3020 FOOT LONGITUD/METATARSAL SUP: HCPCS | Performed by: PODIATRIST

## 2022-10-20 PROCEDURE — 99213 OFFICE O/P EST LOW 20 MIN: CPT | Performed by: PODIATRIST

## 2022-10-20 NOTE — PROGRESS NOTES
Assessment/Plan:  Pain upon ambulation   Hammertoe formation   Pes planus   Need for orthotics   Ingrown toenail   Paronychia  Acquired deformity foot bilateral   Acquired pes planus bilateral     Plan   Foot exam performed   Patient educated on condition  Keven Zabrina will use orthotics daily  Patient needs new orthotics  His feet have been casted for these today  Ivone Arenas will stretch   All nails debrided without pain or complication             Diagnoses and all orders for this visit:     Pain in both feet     Onychomycosis     Hammer toes of both feet     Ingrown toenail     Hammer toe of right foot    Acquired deformity foot bilateral      Acquired pes planus             Subjective:  Patient has pain upon ambulation   He has pain in his toes with ambulation   He has painful hammertoes   He uses orthotics daily   No history of trauma  Patient believes his orthotics are no longer working    He is requesting new orthotics     No Known Allergies        Current Outpatient Medications:   •  cholecalciferol (VITAMIN D3) 1,000 units tablet, Take 1,000 Units by mouth daily, Disp: , Rfl:   •  Cholecalciferol (VITAMIN D3) 15830 units CAPS, Take by mouth once a week, Disp: , Rfl:   •  diclofenac sodium (VOLTAREN) 1 %, Apply 4 g topically 4 (four) times a day (Patient taking differently: Apply 4 g topically 4 (four) times a day ), Disp: 4 Tube, Rfl: 1  •  doxazosin (CARDURA) 4 mg tablet, Take 4 mg by mouth daily at bedtime, Disp: , Rfl:   •  meloxicam (MOBIC) 7 5 mg tablet, Take 1 tablet (7 5 mg total) by mouth daily for 10 days (Patient not taking: Reported on 11/20/2019), Disp: 10 tablet, Rfl: 0  •  tamsulosin (FLOMAX) 0 4 mg, Take 0 4 mg by mouth daily with dinner, Disp: , Rfl:            Patient Active Problem List   Diagnosis   • Plantar fasciitis   • Pain in both feet   • Congenital pes planus of right foot   • Congenital pes planus of left foot   • Tinea pedis of both feet   • Dark stools             Patient ID: Jose JEWELL Wesley is a 61 y  o  male      HPI     The following portions of the patient's history were reviewed and updated as appropriate:      family history includes Cancer in his father; Colon cancer in his maternal aunt; Heart disease in his mother; Hypertension in his father; Stomach cancer in his father        reports that he has never smoked  He has never used smokeless tobacco  He reports that he drinks alcohol   He reports that he does not use drugs            Vitals:     03/12/20 0927   BP: 132/82   Pulse: 89   Resp: 16         Review of Systems       Objective:  Patient's shoes and socks removed    Foot ExamPhysical Exam          Pulses palpable bilateral PT DP and PT pulses 2/4 bilateral  Significant pes planus bilateral, there is decreased  of the arch on weight-bearing  Right 2nd digit there is medial deviation of the PIPJ mild pain on palpation of 2nd MPJ mild swelling  Severe hallux limitus bilateral left worse than right less than 15° of dorsiflexion 1st ray  No hypermobility of the 1st ray  Muscle strength 5/5 all groups bilateral feet and ankles  Moderate equinus bilateral 5° dorsiflexion knee flexed and extended  Negative edema negative erythema no signs of infection  Nails are thickened discolored dystrophic mild onychomycosis  Ingrown nail both borders bilateral hallux     Ingrown right hallux medial border   Mild hyperkeratotic lesion on the medial border but no sign of infection  Mild pain on palpation plantar medial heel bilateral  Some rigid hammertoes 2nd digit bilateral

## 2022-10-21 PROCEDURE — 88305 TISSUE EXAM BY PATHOLOGIST: CPT | Performed by: PATHOLOGY

## 2022-10-21 NOTE — RESULT ENCOUNTER NOTE
Please call the patient regarding his abnormal result  Polyps were benign  Repeat colonoscopy in 10 years, if any GI symptoms call our office for evaluation    Follow up in my office as needed

## 2022-10-26 ENCOUNTER — HOSPITAL ENCOUNTER (OUTPATIENT)
Dept: RADIOLOGY | Facility: HOSPITAL | Age: 57
Discharge: HOME | End: 2022-10-26
Payer: COMMERCIAL

## 2022-10-26 ENCOUNTER — HOSPITAL ENCOUNTER (OUTPATIENT)
Dept: RADIOLOGY | Facility: HOSPITAL | Age: 57
Discharge: HOME | End: 2022-10-26
Attending: SPECIALIST
Payer: COMMERCIAL

## 2022-10-26 DIAGNOSIS — N20.0 RENAL CALCULUS: ICD-10-CM

## 2022-10-26 PROCEDURE — 76775 US EXAM ABDO BACK WALL LIM: CPT

## 2022-10-26 PROCEDURE — 74018 RADEX ABDOMEN 1 VIEW: CPT

## 2022-11-16 NOTE — PROGRESS NOTES
Assessment/Plan:    Educated patient on the etiology of his lesion, patient use OTC onychomycotic management, patient opted for soaking his foot daily , all nails reduced, to patient tolerance, debulked, using a bur, patient return p r n   - discussed the biomechanical etiology of the lesions including the effect of shoe gear, aseptic debridement and paring of a hyperkeratotic lesions at the location described ,  utilizing a sterile instruments was performed removing hyperkeratotic tissue to viable intact skin, patient tolerated the procedure well, patient educated and the use of proper shoe gear and orthotics to alleviate pressure and application of daily moisturizers    Diagnoses and all orders for this visit:    Ingrown toenail    Onychomycosis    Pain in both feet          Subjective:      Patient ID: Leonel Moise is a 61 y o  male  Patient returns to the office for discolored right hallucal toenail, with ingrown toenails and thickening of the skin at the bottom of the foot      The following portions of the patient's history were reviewed and updated as appropriate: allergies, current medications, past family history, past medical history, past social history, past surgical history and problem list     Review of Systems   Constitutional: Negative  Respiratory: Negative  Cardiovascular: Negative  Musculoskeletal: Negative  Skin: Negative  Historical Information   Past Medical History:   Diagnosis Date   • BPH (benign prostatic hyperplasia)    • Hypertension      Past Surgical History:   Procedure Laterality Date   • COLONOSCOPY N/A 10/18/2017    Procedure: COLONOSCOPY;  Surgeon: Denisse Magaña MD;  Location: [facility] GI LAB; Service: Gastroenterology   • EPIDURAL BLOCK INJECTION Left 9/16/2021    Procedure: L3 L4 transforaminal epidural steroid injection ( [id number]);   Surgeon: Lizz Argueta MD;  Location: [facility] MAIN OR;  Service: Pain Management    • HEMORRHOID SURGERY Social History   Social History     Substance and Sexual Activity   Alcohol Use Not Currently     Social History     Substance and Sexual Activity   Drug Use No     Social History     Tobacco Use   Smoking Status Never   Smokeless Tobacco Never     Family History:   Family History   Problem Relation Age of Onset   • Heart disease Mother    • Cancer Father         stomach   • Hypertension Father    • Stomach cancer Father    • Colon cancer Maternal Aunt        Meds/Allergies   all medications and allergies reviewed  No Known Allergies    Objective:      /82   Pulse 82   Resp 17   Ht 5' 10" (1 778 m)   Wt 118 kg (260 lb)   BMI 37 31 kg/m²          Physical Exam  Constitutional:       General: He is not in acute distress  Appearance: He is well-developed  He is not ill-appearing, toxic-appearing or diaphoretic  HENT:      Head: Normocephalic and atraumatic  Cardiovascular:      Pulses: Normal pulses  Dorsalis pedis pulses are 2+ on the right side and 2+ on the left side  Posterior tibial pulses are 2+ on the right side and 2+ on the left side  Pulmonary:      Effort: No respiratory distress  Musculoskeletal:         General: Normal range of motion  Comments: Pain on palpation along plantar fascia bilateral foot and the medial calcaneal tuberosity  Gastroc equinus bilateral   Feet:      Right foot:      Protective Sensation: 10 sites tested  10 sites sensed  Skin integrity: No ulcer, blister, skin breakdown or erythema  Toenail Condition: Right toenails are abnormally thick, long and ingrown  Fungal disease present  Left foot:      Protective Sensation: 10 sites tested  10 sites sensed  Skin integrity: No ulcer, blister, skin breakdown or erythema  Toenail Condition: Left toenails are abnormally thick, long and ingrown  Fungal disease present  Skin:     Capillary Refill: Capillary refill takes 2 to 3 seconds  Coloration: Skin is not pale  Comments:     Right hallucal toenail with area of discoloration, likely due to mycotic changes    Mycosis noted to all digits with subungual debris   Neurological:      Mental Status: He is alert and oriented to person, place, and time        Deep Tendon Reflexes: Reflexes abnormal       Foot Exam    Right Foot/Ankle     Inspection and Palpation  Skin Exam: no blister, no maceration, no ulcer and no erythema     Neurovascular  Dorsalis pedis: 2+  Posterior tibial: 2+      Left Foot/Ankle      Inspection and Palpation  Skin Exam: no blister, no maceration, no ulcer and no erythema     Neurovascular  Dorsalis pedis: 2+  Posterior tibial: 2+

## 2023-01-04 ENCOUNTER — OFFICE VISIT (OUTPATIENT)
Dept: PODIATRY | Facility: CLINIC | Age: 58
End: 2023-01-04

## 2023-01-04 VITALS
SYSTOLIC BLOOD PRESSURE: 124 MMHG | RESPIRATION RATE: 16 BRPM | BODY MASS INDEX: 35.93 KG/M2 | DIASTOLIC BLOOD PRESSURE: 78 MMHG | HEIGHT: 70 IN | WEIGHT: 251 LBS

## 2023-01-04 DIAGNOSIS — M20.42 HAMMER TOES OF BOTH FEET: ICD-10-CM

## 2023-01-04 DIAGNOSIS — M21.961 ACQUIRED DEFORMITY OF RIGHT FOOT: Primary | ICD-10-CM

## 2023-01-04 DIAGNOSIS — M20.41 HAMMER TOES OF BOTH FEET: ICD-10-CM

## 2023-01-04 DIAGNOSIS — M21.962 ACQUIRED DEFORMITY OF LEFT FOOT: ICD-10-CM

## 2023-01-04 DIAGNOSIS — L60.0 INGROWN TOENAIL: ICD-10-CM

## 2023-01-04 DIAGNOSIS — B35.1 ONYCHOMYCOSIS: ICD-10-CM

## 2023-01-04 NOTE — PROGRESS NOTES
Assessment/Plan:  Pain upon ambulation   Hammertoe formation   Pes planus   Need for orthotics   Ingrown toenail   Paronychia  Acquired deformity foot bilateral   Acquired pes planus bilateral     Plan   Foot exam performed   Patient educated on condition  Alexander Rae will use orthotics daily  Patient needs new orthotics  His feet have been casted for these today  Cecilia Foreman will stretch   All nails debrided without pain or complication             Diagnoses and all orders for this visit:     Pain in both feet     Onychomycosis     Hammer toes of both feet     Ingrown toenail     Hammer toe of right foot     Acquired deformity foot bilateral        Acquired pes planus             Subjective:  Patient has pain upon ambulation   He has pain in his toes with ambulation   He has painful hammertoes   He uses orthotics daily   No history of trauma  Patient believes his orthotics are no longer working    He is requesting new orthotics     No Known Allergies        Current Outpatient Medications:   •  cholecalciferol (VITAMIN D3) 1,000 units tablet, Take 1,000 Units by mouth daily, Disp: , Rfl:   •  Cholecalciferol (VITAMIN D3) 30568 units CAPS, Take by mouth once a week, Disp: , Rfl:   •  diclofenac sodium (VOLTAREN) 1 %, Apply 4 g topically 4 (four) times a day (Patient taking differently: Apply 4 g topically 4 (four) times a day ), Disp: 4 Tube, Rfl: 1  •  doxazosin (CARDURA) 4 mg tablet, Take 4 mg by mouth daily at bedtime, Disp: , Rfl:   •  meloxicam (MOBIC) 7 5 mg tablet, Take 1 tablet (7 5 mg total) by mouth daily for 10 days (Patient not taking: Reported on 11/20/2019), Disp: 10 tablet, Rfl: 0  •  tamsulosin (FLOMAX) 0 4 mg, Take 0 4 mg by mouth daily with dinner, Disp: , Rfl:            Patient Active Problem List   Diagnosis   • Plantar fasciitis   • Pain in both feet   • Congenital pes planus of right foot   • Congenital pes planus of left foot   • Tinea pedis of both feet   • Dark stools             Patient ID: Jose Consuelo Thomas a 61 y  o  male      HPI     The following portions of the patient's history were reviewed and updated as appropriate:      family history includes Cancer in his father; Colon cancer in his maternal aunt; Heart disease in his mother; Hypertension in his father; Stomach cancer in his father        reports that he has never smoked  He has never used smokeless tobacco  He reports that he drinks alcohol   He reports that he does not use drugs            Vitals:     03/12/20 0927   BP: 132/82   Pulse: 89   Resp: 16         Review of Systems       Objective:  Patient's shoes and socks removed    Foot ExamPhysical Exam          Pulses palpable bilateral PT DP and PT pulses 2/4 bilateral  Significant pes planus bilateral, there is decreased  of the arch on weight-bearing  Right 2nd digit there is medial deviation of the PIPJ mild pain on palpation of 2nd MPJ mild swelling  Severe hallux limitus bilateral left worse than right less than 15° of dorsiflexion 1st ray  No hypermobility of the 1st ray  Muscle strength 5/5 all groups bilateral feet and ankles  Moderate equinus bilateral 5° dorsiflexion knee flexed and extended  Negative edema negative erythema no signs of infection  Nails are thickened discolored dystrophic mild onychomycosis  Ingrown nail both borders bilateral hallux     Ingrown right hallux medial border   Mild hyperkeratotic lesion on the medial border but no sign of infection  Mild pain on palpation plantar medial heel bilateral  Some rigid hammertoes 2nd digit bilateral

## 2023-01-18 ENCOUNTER — OFFICE VISIT (OUTPATIENT)
Dept: FAMILY MEDICINE CLINIC | Facility: CLINIC | Age: 58
End: 2023-01-18

## 2023-01-18 VITALS
SYSTOLIC BLOOD PRESSURE: 130 MMHG | HEIGHT: 70 IN | WEIGHT: 254 LBS | BODY MASS INDEX: 36.36 KG/M2 | OXYGEN SATURATION: 97 % | DIASTOLIC BLOOD PRESSURE: 80 MMHG | HEART RATE: 83 BPM

## 2023-01-18 DIAGNOSIS — E78.5 DYSLIPIDEMIA: ICD-10-CM

## 2023-01-18 DIAGNOSIS — J06.9 UPPER RESPIRATORY TRACT INFECTION, UNSPECIFIED TYPE: Primary | ICD-10-CM

## 2023-01-18 DIAGNOSIS — N40.0 BENIGN PROSTATIC HYPERPLASIA WITHOUT LOWER URINARY TRACT SYMPTOMS: ICD-10-CM

## 2023-01-18 DIAGNOSIS — E66.09 CLASS 2 OBESITY DUE TO EXCESS CALORIES WITHOUT SERIOUS COMORBIDITY WITH BODY MASS INDEX (BMI) OF 36.0 TO 36.9 IN ADULT: ICD-10-CM

## 2023-01-18 RX ORDER — ATORVASTATIN CALCIUM 10 MG/1
10 TABLET, FILM COATED ORAL
Qty: 90 TABLET | Refills: 0 | Status: SHIPPED | OUTPATIENT
Start: 2023-01-18

## 2023-01-18 RX ORDER — AZITHROMYCIN 250 MG/1
TABLET, FILM COATED ORAL
Qty: 6 TABLET | Refills: 0 | Status: SHIPPED | OUTPATIENT
Start: 2023-01-18 | End: 2023-01-23

## 2023-01-18 RX ORDER — FINASTERIDE 5 MG/1
TABLET, FILM COATED ORAL
COMMUNITY
Start: 2023-01-03

## 2023-01-18 NOTE — PROGRESS NOTES
Office Visit Note  23     Robbie Olson 61 y o  male MRN: [id number]  : 1965    Assessment:     1  Benign prostatic hyperplasia without lower urinary tract symptoms  Assessment & Plan:  Patient with BPH he recently had an infection according to him was given antibiotics by the urologist currently is taking Cardura Proscar and Flomax  According to him the urine flow is much better now compared to before  2  Class 2 obesity due to excess calories without serious comorbidity with body mass index (BMI) of 36 0 to 36 9 in adult  Assessment & Plan:  BMI is 36 4 we again emphasized regarding diet exercise losing weight lifestyle modification cutting back on carbohydrate intake calorie intake      3  Dyslipidemia  Assessment & Plan:  Patient's cholesterol level is 142 triglycerides 80 HDL is 36 and LDL is 90 currently is taking atorvastatin 10 mg at bedtime we will continue the same again emphasized regarding strict diet exercise losing weight  4  Upper respiratory tract infection, unspecified type  Assessment & Plan:  Patient with upper respiratory symptoms scratchy throat feeling no difficulty swallowing throat exam shows redness no exudate noted  Recommend patient to do salt water gargles symptomatic treatment          Discussion Summary and Plan: Today's care plan and medications were reviewed with patient in detail and all their questions answered to their satisfaction  Chief Complaint   Patient presents with   • Follow-up      Subjective:  Patient is coming here for evaluation regarding symptoms of upper respiratory tract with scratchy throat  No difficulty swallowing no fever no nausea vomiting diarrhea abdominal pain  No cough no shortness of breath    No body aches      The following portions of the patient's history were reviewed and updated as appropriate: allergies, current medications, past family history, past medical history, past social history, past surgical history and problem list     Review of Systems   Constitutional: Negative for chills and fever  HENT: Positive for sore throat  Negative for ear pain  Eyes: Negative for pain and visual disturbance  Respiratory: Negative for cough and shortness of breath  Cardiovascular: Negative for chest pain and palpitations  Gastrointestinal: Negative for abdominal pain and vomiting  Genitourinary: Negative for dysuria and hematuria  Musculoskeletal: Positive for arthralgias  Negative for back pain  Skin: Negative for color change and rash  Neurological: Negative for seizures and syncope  All other systems reviewed and are negative  Historical Information   Patient Active Problem List   Diagnosis   • Plantar fasciitis   • Pain in both feet   • Congenital pes planus of right foot   • Congenital pes planus of left foot   • Tinea pedis of both feet   • Dark stools   • Grade II hemorrhoids   • HTN (hypertension)   • Dyslipidemia   • Benign prostatic hyperplasia without lower urinary tract symptoms   • Class 2 obesity in adult   • URI (upper respiratory infection)     Past Medical History:   Diagnosis Date   • BPH (benign prostatic hyperplasia)    • Hypertension      Past Surgical History:   Procedure Laterality Date   • COLONOSCOPY N/A 10/18/2017    Procedure: COLONOSCOPY;  Surgeon: Kristy Benton MD;  Location: [facility] GI LAB; Service: Gastroenterology   • EPIDURAL BLOCK INJECTION Left 9/16/2021    Procedure: L3 L4 transforaminal epidural steroid injection ( [id number] [id number]);   Surgeon: Peter Garland MD;  [place] [facility] [place];  Service: Pain Management    • HEMORRHOID SURGERY       Social History     Substance and Sexual Activity   Alcohol Use Not Currently     Social History     Substance and Sexual Activity   Drug Use No     Social History     Tobacco Use   Smoking Status Never   Smokeless Tobacco Never     Family History   Problem Relation Age of Onset   • Heart disease Mother    • Cancer Father         stomach • Hypertension Father    • Stomach cancer Father    • Colon cancer Maternal Aunt      Health Maintenance Due   Topic   • Hepatitis C Screening    • HIV Screening    • BMI: Followup Plan    • Annual Physical    • DTaP,Tdap,and Td Vaccines (1 - Tdap)   • COVID-19 Vaccine (3 - Booster for [place] Peter series)      Meds/Allergies       Current Outpatient Medications:   •  atorvastatin (LIPITOR) 10 mg tablet, TAKE ONE TABLET DAILY AT BEDTIME AS DIRECTED, Disp: 30 tablet, Rfl: 2  •  cholecalciferol (VITAMIN D3) 1,000 units tablet, Take 1,000 Units by mouth daily, Disp: , Rfl:   •  doxazosin (CARDURA) 4 mg tablet, Take 4 mg by mouth daily at bedtime, Disp: , Rfl:   •  finasteride (PROSCAR) 5 mg tablet, , Disp: , Rfl:   •  tamsulosin (FLOMAX) 0 4 mg, Take 0 4 mg by mouth daily with dinner, Disp: , Rfl:       Objective:    Vitals:   /80 (BP Location: Right arm, Patient Position: Sitting, Cuff Size: Large)   Pulse 83   Ht 5' 10" (1 778 m)   Wt 115 kg (254 lb)   SpO2 97%   BMI 36 45 kg/m²   Body mass index is 36 45 kg/m²  Vitals:    01/18/23 0916   Weight: 115 kg (254 lb)       Physical Exam  Vitals and nursing note reviewed  Constitutional:       Appearance: Normal appearance  He is obese  HENT:      Mouth/Throat:      Comments: Throat is congested  Cardiovascular:      Rate and Rhythm: Normal rate and regular rhythm  Heart sounds: Normal heart sounds  Pulmonary:      Effort: Pulmonary effort is normal       Breath sounds: Normal breath sounds  Musculoskeletal:      Right lower leg: No edema  Left lower leg: No edema  Neurological:      Mental Status: He is alert  Lab Review   No visits with results within 2 Month(s) from this visit     Latest known visit with results is:   Office Visit on 10/18/2022   Component Date Value Ref Range Status   • White Blood Cell Count 10/19/2022 6 9  3 4 - 10 8 x10E3/uL Final   • Red Blood Cell Count 10/19/2022 4 94  4 14 - 5 80 x10E6/uL Final   • Hemoglobin 10/19/2022 15 1  13 0 - 17 7 g/dL Final   • HCT 10/19/2022 42 5  37 5 - 51 0 % Final   • MCV 10/19/2022 86  79 - 97 fL Final   • MCH 10/19/2022 30 6  26 6 - 33 0 pg Final   • MCHC 10/19/2022 35 5  31 5 - 35 7 g/dL Final   • RDW 10/19/2022 12 7  11 6 - 15 4 % Final   • Platelet Count 51/96/5712 188  150 - 450 x10E3/uL Final   • Neutrophils 10/19/2022 68  Not Estab  % Final   • Lymphocytes 10/19/2022 21  Not Estab  % Final   • Monocytes 10/19/2022 7  Not Estab  % Final   • Eosinophils 10/19/2022 3  Not Estab  % Final   • Basophils PCT 10/19/2022 1  Not Estab  % Final   • Neutrophils (Absolute) 10/19/2022 4 7  1 4 - 7 0 x10E3/uL Final   • Lymphocytes (Absolute) 10/19/2022 1 5  0 7 - 3 1 x10E3/uL Final   • Monocytes (Absolute) 10/19/2022 0 5  0 1 - 0 9 x10E3/uL Final   • Eosinophils (Absolute) 10/19/2022 0 2  0 0 - 0 4 x10E3/uL Final   • Basophils ABS 10/19/2022 0 1  0 0 - 0 2 x10E3/uL Final   • Immature Granulocytes 10/19/2022 0  Not Estab  % Final   • Immature Granulocytes (Absolute) 10/19/2022 0 0  0 0 - 0 1 x10E3/uL Final   • Glucose, Random 10/19/2022 96  70 - 99 mg/dL Final   • BUN 10/19/2022 14  6 - 24 mg/dL Final   • Creatinine 10/19/2022 1 06  0 76 - 1 27 mg/dL Final   • eGFR 10/19/2022 81  >59 mL/min/1 73 Final   • SL AMB BUN/CREATININE RATIO 10/19/2022 13  9 - 20 Final   • Sodium 10/19/2022 142  134 - 144 mmol/L Final   • Potassium 10/19/2022 4 0  3 5 - 5 2 mmol/L Final   • Chloride 10/19/2022 105  96 - 106 mmol/L Final   • CO2 10/19/2022 23  20 - 29 mmol/L Final   • CALCIUM 10/19/2022 9 3  8 7 - 10 2 mg/dL Final   • Protein, Total 10/19/2022 6 9  6 0 - 8 5 g/dL Final   • Albumin 10/19/2022 4 8  3 8 - 4 9 g/dL Final   • Globulin, Total 10/19/2022 2 1  1 5 - 4 5 g/dL Final   • Albumin/Globulin Ratio 10/19/2022 2 3 (H)  1 2 - 2 2 Final   • TOTAL BILIRUBIN 10/19/2022 0 6  0 0 - 1 2 mg/dL Final   • Alk Phos Isoenzymes 10/19/2022 99  44 - 121 IU/L Final   • AST 10/19/2022 13  0 - 40 IU/L Final   • ALT 10/19/2022 20  0 - 44 IU/L Final   • Hemoglobin A1C 10/19/2022 5 4  4 8 - 5 6 % Final    Comment:          Prediabetes: 5 7 - 6 4           Diabetes: >6 4           Glycemic control for adults with diabetes: <7 0     • Estimated Average Glucose 10/19/2022 108  mg/dL Final   • Cholesterol, Total 10/19/2022 142  100 - 199 mg/dL Final   • Triglycerides 10/19/2022 80  0 - 149 mg/dL Final   • HDL 10/19/2022 36 (L)  >39 mg/dL Final   • VLDL Cholesterol Calculated 10/19/2022 16  5 - 40 mg/dL Final   • LDL Calculated 10/19/2022 90  0 - 99 mg/dL Final   • T  Chol/HDL Ratio 10/19/2022 3 9  0 0 - 5 0 ratio Final    Comment:                                   T  Chol/HDL Ratio                                              Men  Women                                1/2 Avg  Risk  3 4    3 3                                    Avg Risk  5 0    4 4                                 2X Avg  Risk  9 6    7 1                                 3X Avg  Risk 23 4   11 0     • TSH 10/19/2022 1 900  0 450 - 4 500 uIU/mL Final         Cipriano Serrato MD        "This note has been constructed using a voice recognition system  Therefore there may be syntax, spelling, and/or grammatical errors   Please call if you have any questions  "

## 2023-01-18 NOTE — ASSESSMENT & PLAN NOTE
Patient's cholesterol level is 142 triglycerides 80 HDL is 36 and LDL is 90 currently is taking atorvastatin 10 mg at bedtime we will continue the same again emphasized regarding strict diet exercise losing weight

## 2023-01-18 NOTE — ASSESSMENT & PLAN NOTE
BMI is 36 4 we again emphasized regarding diet exercise losing weight lifestyle modification cutting back on carbohydrate intake calorie intake

## 2023-01-18 NOTE — ASSESSMENT & PLAN NOTE
Patient with BPH he recently had an infection according to him was given antibiotics by the urologist currently is taking Cardura Proscar and Flomax  According to him the urine flow is much better now compared to before

## 2023-01-18 NOTE — ASSESSMENT & PLAN NOTE
Patient with upper respiratory symptoms scratchy throat feeling no difficulty swallowing throat exam shows redness no exudate noted    Recommend patient to do salt water gargles symptomatic treatment

## 2023-02-28 ENCOUNTER — TELEPHONE (OUTPATIENT)
Dept: PAIN MEDICINE | Facility: CLINIC | Age: 58
End: 2023-02-28

## 2023-02-28 ENCOUNTER — OFFICE VISIT (OUTPATIENT)
Dept: PAIN MEDICINE | Facility: CLINIC | Age: 58
End: 2023-02-28

## 2023-02-28 VITALS
BODY MASS INDEX: 36.59 KG/M2 | WEIGHT: 255 LBS | DIASTOLIC BLOOD PRESSURE: 61 MMHG | SYSTOLIC BLOOD PRESSURE: 151 MMHG | HEART RATE: 78 BPM | TEMPERATURE: 98.2 F

## 2023-02-28 DIAGNOSIS — M51.16 LUMBAR DISC DISEASE WITH RADICULOPATHY: ICD-10-CM

## 2023-02-28 DIAGNOSIS — G89.4 CHRONIC PAIN SYNDROME: Primary | ICD-10-CM

## 2023-02-28 DIAGNOSIS — M51.26 PROTRUSION OF LUMBAR INTERVERTEBRAL DISC: ICD-10-CM

## 2023-02-28 DIAGNOSIS — M48.061 LUMBAR FORAMINAL STENOSIS: ICD-10-CM

## 2023-02-28 RX ORDER — DULOXETIN HYDROCHLORIDE 30 MG/1
30 CAPSULE, DELAYED RELEASE ORAL DAILY
Qty: 30 CAPSULE | Refills: 1 | Status: SHIPPED | OUTPATIENT
Start: 2023-02-28

## 2023-02-28 NOTE — TELEPHONE ENCOUNTER
Scheduled patient for LESI on 4/19/23  Patient denies RX blood thinners/ NSAIDS  Nothing to eat or drink 1 hour prior to procedure  Needs to arrange transportation  Proper clothing for procedure  No vaccines 2 weeks prior or after procedure  If ill or place on antibiotics, please call to reschedule

## 2023-02-28 NOTE — PROGRESS NOTES
Assessment:  1  Chronic pain syndrome    2  Lumbar disc disease with radiculopathy    3  Protrusion of lumbar intervertebral disc    4  Lumbar foraminal stenosis        Plan:    Mr Zakiya Mendosa is a pleasant 58-year-old male who presents to [address] [facility] for follow-up and reevaluation  Previously saw Dr Jannie reid in 2021 and had a left-sided transforaminal epidural steroid injection with minimal relief in his low back pain  Since then he reports worsening low back pain with radiating symptoms into bilateral lower extremities and continues to demonstrate clinical and diagnostic evidence of lumbar radiculopathy likely in relation to the multilevel disc protrusions and foraminal narrowing most notable at L3-L4 and L4-L5  At this time further interventional approaches from a different approach will be beneficial and warranted  As such we will plan for an LESI L3-L4  We will also start the patient on Cymbalta 30 mg daily for both neuropathic pain control and mood stabilization  All questions answered, patient is agreeable plan  Complete risks and benefits including bleeding, infection, tissue reaction, nerve injury and allergic reaction were discussed  The approach was demonstrated using models and literature was provided  Verbal and written consent was obtained  History of Present Illness:    Mari Zamora is a 61 y o  male who presents to [facility] for initial evaluation of the above stated pain complaints  The patient has a past medical and chronic pain history as outlined in the assessment section  Low back pain with radiating symptoms into bilateral lower extremities currently rated 9 out of 10 and interfering with activities  Pain is described as a constant burning, dull aching, sharp, throbbing, shooting, numbness sensation that is worse in the morning in the evening  Presents today for follow-up and reevaluation    Review of Systems:    Review of Systems   Constitutional: Negative for unexpected weight change  HENT: Negative for ear pain  Eyes: Negative for visual disturbance  Respiratory: Negative for shortness of breath and wheezing  Gastrointestinal: Negative for abdominal pain  Musculoskeletal: Positive for back pain and gait problem  Joint stiffness, pain in back and hips decreased ROM   Neurological: Positive for numbness  Negative for weakness  Psychiatric/Behavioral: Negative for decreased concentration  Past Medical History:   Diagnosis Date   • BPH (benign prostatic hyperplasia)    • Hypertension        Past Surgical History:   Procedure Laterality Date   • COLONOSCOPY N/A 10/18/2017    Procedure: COLONOSCOPY;  Surgeon: Hoang Shepard MD;  Location: [facility] GI LAB; Service: Gastroenterology   • EPIDURAL BLOCK INJECTION Left 9/16/2021    Procedure: L3 L4 transforaminal epidural steroid injection ( [id number]);   Surgeon: Fantasma Crum MD;  Location: [facility] MAIN OR;  Service: Pain Management    • HEMORRHOID SURGERY         Family History   Problem Relation Age of Onset   • Heart disease Mother    • Cancer Father         stomach   • Hypertension Father    • Stomach cancer Father    • Colon cancer Maternal Aunt        Social History     Occupational History   • Not on file   Tobacco Use   • Smoking status: Never   • Smokeless tobacco: Never   Vaping Use   • Vaping Use: Never used   Substance and Sexual Activity   • Alcohol use: Not Currently   • Drug use: No   • Sexual activity: Not on file         Current Outpatient Medications:   •  atorvastatin (LIPITOR) 10 mg tablet, Take 1 tablet (10 mg total) by mouth daily at bedtime, Disp: 90 tablet, Rfl: 0  •  cholecalciferol (VITAMIN D3) 1,000 units tablet, Take 1,000 Units by mouth daily, Disp: , Rfl:   •  doxazosin (CARDURA) 4 mg tablet, Take 4 mg by mouth daily at bedtime, Disp: , Rfl:   •  DULoxetine (CYMBALTA) 30 mg delayed release capsule, Take 1 capsule (30 mg total) by mouth daily, Disp: 30 capsule, Rfl: 1  •  finasteride (PROSCAR) 5 mg tablet, , Disp: , Rfl:   •  tamsulosin (FLOMAX) 0 4 mg, Take 0 4 mg by mouth daily with dinner, Disp: , Rfl:     No Known Allergies    Physical Exam:    /61   Pulse 78   Temp 98 2 °F (36 8 °C)   Wt 116 kg (255 lb)   BMI 36 59 kg/m²     Constitutional: normal, well developed, well nourished, alert, in no distress and non-toxic and no overt pain behavior  Eyes: anicteric  HEENT: grossly intact  Neck: supple, symmetric, trachea midline and no masses   Pulmonary:even and unlabored  Cardiovascular:No edema or pitting edema present  Skin:Normal without rashes or lesions and well hydrated  Psychiatric:Mood and affect appropriate  Neurologic:Cranial Nerves II-XII grossly intact  Musculoskeletal:antalgic    Imaging  No orders to display       No orders of the defined types were placed in this encounter

## 2023-03-08 ENCOUNTER — OFFICE VISIT (OUTPATIENT)
Dept: PODIATRY | Facility: CLINIC | Age: 58
End: 2023-03-08

## 2023-03-08 VITALS — WEIGHT: 255 LBS | RESPIRATION RATE: 17 BRPM | HEIGHT: 70 IN | BODY MASS INDEX: 36.51 KG/M2

## 2023-03-08 DIAGNOSIS — L60.0 INGROWN TOENAIL: ICD-10-CM

## 2023-03-08 DIAGNOSIS — M21.962 ACQUIRED DEFORMITY OF LEFT FOOT: ICD-10-CM

## 2023-03-08 DIAGNOSIS — M21.961 ACQUIRED DEFORMITY OF RIGHT FOOT: ICD-10-CM

## 2023-03-08 DIAGNOSIS — B35.1 ONYCHOMYCOSIS: ICD-10-CM

## 2023-03-08 DIAGNOSIS — M79.672 PAIN IN BOTH FEET: Primary | ICD-10-CM

## 2023-03-08 DIAGNOSIS — M20.41 HAMMER TOES OF BOTH FEET: ICD-10-CM

## 2023-03-08 DIAGNOSIS — M20.42 HAMMER TOES OF BOTH FEET: ICD-10-CM

## 2023-03-08 DIAGNOSIS — M79.671 PAIN IN BOTH FEET: Primary | ICD-10-CM

## 2023-03-08 NOTE — PROGRESS NOTES
Assessment/Plan:  Pain upon ambulation   Hammertoe formation   Pes planus   Need for orthotics   Ingrown toenail   Paronychia   Acquired deformity foot bilateral   Acquired pes planus bilateral     Plan   Foot exam performed   Patient educated on condition  Santibarbara Prather will use orthotics daily   Patient needs new orthotics   His feet have been casted for these today  Johnathankristal Garcia will stretch   All nails debrided without pain or complication             Diagnoses and all orders for this visit:     Pain in both feet     Onychomycosis     Hammer toes of both feet     Ingrown toenail     Hammer toe of right foot     Acquired deformity foot bilateral        Acquired pes planus             Subjective:  Patient has pain upon ambulation   He has pain in his toes with ambulation   He has painful hammertoes   He uses orthotics daily   No history of trauma   Patient believes his orthotics are no longer working  Yaya Garcia is requesting new orthotics     No Known Allergies        Current Outpatient Medications:   •  cholecalciferol (VITAMIN D3) 1,000 units tablet, Take 1,000 Units by mouth daily, Disp: , Rfl:   •  Cholecalciferol (VITAMIN D3) 87380 units CAPS, Take by mouth once a week, Disp: , Rfl:   •  diclofenac sodium (VOLTAREN) 1 %, Apply 4 g topically 4 (four) times a day (Patient taking differently: Apply 4 g topically 4 (four) times a day ), Disp: 4 Tube, Rfl: 1  •  doxazosin (CARDURA) 4 mg tablet, Take 4 mg by mouth daily at bedtime, Disp: , Rfl:   •  meloxicam (MOBIC) 7 5 mg tablet, Take 1 tablet (7 5 mg total) by mouth daily for 10 days (Patient not taking: Reported on 11/20/2019), Disp: 10 tablet, Rfl: 0  •  tamsulosin (FLOMAX) 0 4 mg, Take 0 4 mg by mouth daily with dinner, Disp: , Rfl:            Patient Active Problem List   Diagnosis   • Plantar fasciitis   • Pain in both feet   • Congenital pes planus of right foot   • Congenital pes planus of left foot   • Tinea pedis of both feet   • Dark stools             Patient ID: Jose Eva Mendez a 57 y  o  male      HPI     The following portions of the patient's history were reviewed and updated as appropriate:      family history includes Cancer in his father; Colon cancer in his maternal aunt; Heart disease in his mother; Hypertension in his father; Stomach cancer in his father        reports that he has never smoked  He has never used smokeless tobacco  He reports that he drinks alcohol   He reports that he does not use drugs       Objective:  Patient's shoes and socks removed    Foot ExamPhysical Exam          Pulses palpable bilateral PT DP and PT pulses 2/4 bilateral  Significant pes planus bilateral, there is decreased  of the arch on weight-bearing  Right 2nd digit there is medial deviation of the PIPJ mild pain on palpation of 2nd MPJ mild swelling  Severe hallux limitus bilateral left worse than right less than 15° of dorsiflexion 1st ray  No hypermobility of the 1st ray  Muscle strength 5/5 all groups bilateral feet and ankles  Moderate equinus bilateral 5° dorsiflexion knee flexed and extended  Negative edema negative erythema no signs of infection  Nails are thickened discolored dystrophic mild onychomycosis  Ingrown nail both borders bilateral hallux     Ingrown right hallux medial border   Mild hyperkeratotic lesion on the medial border but no sign of infection  Mild pain on palpation plantar medial heel bilateral  Some rigid hammertoes 2nd digit bilateral

## 2023-03-29 DIAGNOSIS — M51.16 LUMBAR DISC DISEASE WITH RADICULOPATHY: ICD-10-CM

## 2023-03-29 DIAGNOSIS — M48.061 LUMBAR FORAMINAL STENOSIS: ICD-10-CM

## 2023-03-29 DIAGNOSIS — G89.4 CHRONIC PAIN SYNDROME: ICD-10-CM

## 2023-03-29 DIAGNOSIS — M51.26 PROTRUSION OF LUMBAR INTERVERTEBRAL DISC: ICD-10-CM

## 2023-03-29 RX ORDER — DULOXETIN HYDROCHLORIDE 30 MG/1
CAPSULE, DELAYED RELEASE ORAL
Qty: 30 CAPSULE | Refills: 1 | Status: SHIPPED | OUTPATIENT
Start: 2023-03-29

## 2023-04-26 ENCOUNTER — TELEPHONE (OUTPATIENT)
Dept: RADIOLOGY | Facility: MEDICAL CENTER | Age: 58
End: 2023-04-26

## 2023-04-26 NOTE — TELEPHONE ENCOUNTER
Patient Reports      40   %     improvement post injection    Pain Level  varies   /10    Patient is aware we will call back next week for an update

## 2023-05-05 DIAGNOSIS — L60.0 INGROWN TOENAIL: Primary | ICD-10-CM

## 2023-05-05 RX ORDER — LEVOFLOXACIN 500 MG/1
500 TABLET, FILM COATED ORAL EVERY 24 HOURS
Qty: 7 TABLET | Refills: 0 | Status: SHIPPED | OUTPATIENT
Start: 2023-05-05 | End: 2023-05-12

## 2023-05-05 NOTE — PROGRESS NOTES
Patient with ingrown toenail  He cannot present for office visit however we will start him on oral antibiotic  Patient advised on aftercare

## 2023-05-10 ENCOUNTER — OFFICE VISIT (OUTPATIENT)
Dept: FAMILY MEDICINE CLINIC | Facility: CLINIC | Age: 58
End: 2023-05-10

## 2023-05-10 VITALS
HEART RATE: 89 BPM | SYSTOLIC BLOOD PRESSURE: 150 MMHG | DIASTOLIC BLOOD PRESSURE: 90 MMHG | BODY MASS INDEX: 36.54 KG/M2 | WEIGHT: 255.2 LBS | OXYGEN SATURATION: 96 % | HEIGHT: 70 IN

## 2023-05-10 DIAGNOSIS — J06.9 UPPER RESPIRATORY TRACT INFECTION, UNSPECIFIED TYPE: Primary | ICD-10-CM

## 2023-05-10 DIAGNOSIS — M54.16 LUMBAR RADICULOPATHY: ICD-10-CM

## 2023-05-10 DIAGNOSIS — I10 PRIMARY HYPERTENSION: ICD-10-CM

## 2023-05-10 DIAGNOSIS — R73.03 PRE-DIABETES: ICD-10-CM

## 2023-05-10 DIAGNOSIS — Z13.29 SCREENING FOR THYROID DISORDER: ICD-10-CM

## 2023-05-10 DIAGNOSIS — N40.0 BENIGN PROSTATIC HYPERPLASIA WITHOUT LOWER URINARY TRACT SYMPTOMS: ICD-10-CM

## 2023-05-10 DIAGNOSIS — Z13.0 SCREENING FOR DEFICIENCY ANEMIA: ICD-10-CM

## 2023-05-10 DIAGNOSIS — E78.5 DYSLIPIDEMIA: ICD-10-CM

## 2023-05-10 DIAGNOSIS — E66.09 CLASS 2 OBESITY DUE TO EXCESS CALORIES WITHOUT SERIOUS COMORBIDITY WITH BODY MASS INDEX (BMI) OF 36.0 TO 36.9 IN ADULT: ICD-10-CM

## 2023-05-10 PROBLEM — R19.5 DARK STOOLS: Status: RESOLVED | Noted: 2019-11-20 | Resolved: 2023-05-10

## 2023-05-10 RX ORDER — AZITHROMYCIN 250 MG/1
TABLET, FILM COATED ORAL
Qty: 6 TABLET | Refills: 0 | Status: SHIPPED | OUTPATIENT
Start: 2023-05-10 | End: 2023-05-15

## 2023-05-10 RX ORDER — ATORVASTATIN CALCIUM 10 MG/1
10 TABLET, FILM COATED ORAL
Qty: 90 TABLET | Refills: 0 | Status: SHIPPED | OUTPATIENT
Start: 2023-05-10

## 2023-05-10 NOTE — ASSESSMENT & PLAN NOTE
Patient is currently taking Cardura and Flomax he is not taking the Proscar urine flow is much better    He is being followed by the urologist

## 2023-05-10 NOTE — PROGRESS NOTES
Name: Dipti Wong      : 1965      MRN: [id number]  Encounter Provider: Brody Marinelli MD  Encounter Date: 5/10/2023   Encounter department: [place] [address]     1  Upper respiratory tract infection, unspecified type  Assessment & Plan:  Patient with upper respiratory tract infection symptoms exam shows mild redness in the throat area question mild exudate  Will monitor if necessary start him on Z-Alex       2  Benign prostatic hyperplasia without lower urinary tract symptoms  Assessment & Plan:  Patient is currently taking Cardura and Flomax he is not taking the Proscar urine flow is much better  He is being followed by the urologist       3  Class 2 obesity due to excess calories without serious comorbidity with body mass index (BMI) of 36 0 to 36 9 in adult  Assessment & Plan:  Emphasize regarding diet exercise cutting back carbohydrate intake lifestyle modification lose weight  4  Dyslipidemia  Assessment & Plan:  Patient is currently taking atorvastatin 10 mg Labs noted we will get a repeat level done in next couple of months time  5  Primary hypertension  Assessment & Plan:  Patient blood pressure today is slightly high at 150/90 but he feels he is under stress which might of triggered it but I would like to monitor it closely recheck in few weeks time meanwhile recommend patient to cut back on salt intake lose weight      6  Lumbar radiculopathy  Assessment & Plan:  Patient is being managed by the pain management regarding low back pain for which she had received injections also  BMI Counseling: Body mass index is 36 62 kg/m²  The BMI is above normal  Nutrition recommendations include decreasing portion sizes, encouraging healthy choices of fruits and vegetables, limiting drinks that contain sugar, moderation in carbohydrate intake and reducing intake of cholesterol  Exercise recommendations include strength training exercises   No pharmacotherapy was ordered  Rationale for BMI follow-up plan is due to patient being overweight or obese  Subjective     Patient is coming here for evaluation regarding symptoms of sore throat which has been persistent for the past few days time he is also having cough but not able to bring up much phlegm sometimes clear phlegm is coming out  Patient denies any postnasal dripping does not recall having any type of allergies during this time and does not recall having any kind of reflux symptoms  No fever no shortness of breath or any other associated symptoms  In the past when he gets the sore throat which last for a while he responds to the Z-Alex according to him  Medications reviewed labs reviewed  Patient also had black big toenail he does not recall any injury  He was seen by the podiatrist felt it really go away as there is  blood underneath the nail but it is still present and he received a course of antibiotic and still it is present  Review of Systems   Constitutional: Negative for chills, fatigue and fever  HENT: Negative for ear pain, sore throat and trouble swallowing  Eyes: Negative for pain and visual disturbance  Respiratory: Negative for cough and shortness of breath  Cardiovascular: Negative for chest pain and palpitations  Gastrointestinal: Negative for abdominal pain, constipation, diarrhea and vomiting  Endocrine: Negative for polydipsia  Genitourinary: Negative for difficulty urinating, dysuria, hematuria and testicular pain  Musculoskeletal: Negative for arthralgias, back pain and myalgias  Skin: Negative for color change and rash  Neurological: Negative for dizziness, seizures, syncope, weakness and headaches  Psychiatric/Behavioral: Negative for agitation, confusion and hallucinations  All other systems reviewed and are negative        Past Medical History:   Diagnosis Date   • BPH (benign prostatic hyperplasia)    • Hypertension      Past Surgical History:   Procedure Laterality Date   • COLONOSCOPY N/A 10/18/2017    Procedure: COLONOSCOPY;  Surgeon: Miguel Angel David MD;  Location: [place] [address] GI LAB; Service: Gastroenterology   • EPIDURAL BLOCK INJECTION Left 9/16/2021    Procedure: L3 L4 transforaminal epidural steroid injection ( [id number]);   Surgeon: Miguelangel Narayan MD;  Location: [facility] MAIN OR;  Service: Pain Management    • HEMORRHOID SURGERY     • LUMBAR EPIDURAL INJECTION N/A 4/19/2023    Procedure: L3 L4 LUMBAR epidural steroid injection [id number];  Surgeon: Liam Alvarez DO;  Location: [place] [address] MAIN OR;  Service: Pain Management      Family History   Problem Relation Age of Onset   • Heart disease Mother    • Cancer Father         stomach   • Hypertension Father    • Stomach cancer Father    • Colon cancer Maternal Aunt      Social History     Socioeconomic History   • Marital status: Single     Spouse name: None   • Number of children: None   • Years of education: None   • Highest education level: None   Occupational History   • None   Tobacco Use   • Smoking status: Never   • Smokeless tobacco: Never   Vaping Use   • Vaping Use: Never used   Substance and Sexual Activity   • Alcohol use: Not Currently   • Drug use: No   • Sexual activity: None   Other Topics Concern   • None   Social History Narrative   • None     Social Determinants of Health     Financial Resource Strain: Not on file   Food Insecurity: Not on file   Transportation Needs: Not on file   Physical Activity: Not on file   Stress: Not on file   Social Connections: Not on file   Intimate Partner Violence: Not on file   Housing Stability: Not on file     Current Outpatient Medications on File Prior to Visit   Medication Sig   • atorvastatin (LIPITOR) 10 mg tablet Take 1 tablet (10 mg total) by mouth daily at bedtime   • cholecalciferol (VITAMIN D3) 1,000 units tablet Take 1,000 Units by mouth daily   • doxazosin (CARDURA) 4 mg tablet Take 4 mg by mouth daily at bedtime   • DULoxetine "(CYMBALTA) 30 mg delayed release capsule TAKE 1 CAPSULE DAILY   • finasteride (PROSCAR) 5 mg tablet    • tamsulosin (FLOMAX) 0 4 mg Take 0 4 mg by mouth daily with dinner   • [DISCONTINUED] levofloxacin (LEVAQUIN) 500 mg tablet Take 1 tablet (500 mg total) by mouth every 24 hours for 7 days     No Known Allergies  Immunization History   Administered Date(s) Administered   • COVID-19 PFIZER VACCINE 0 3 ML IM 04/04/2021, 04/28/2021   • INFLUENZA 10/18/2022   • Influenza, recombinant, quadrivalent,injectable, preservative free 10/18/2022       Objective     /90   Pulse 89   Ht 5' 10\" (1 778 m)   Wt 116 kg (255 lb 3 2 oz)   SpO2 96%   BMI 36 62 kg/m²     Physical Exam  Vitals and nursing note reviewed  Constitutional:       Appearance: Normal appearance  He is obese  He is not ill-appearing  HENT:      Right Ear: External ear normal       Left Ear: External ear normal       Nose: Nose normal  No congestion or rhinorrhea  Mouth/Throat:      Pharynx: Oropharynx is clear  No oropharyngeal exudate  Comments: Throat is congested  Eyes:      Conjunctiva/sclera: Conjunctivae normal    Cardiovascular:      Rate and Rhythm: Normal rate and regular rhythm  Pulses: Normal pulses  Heart sounds: Normal heart sounds  No murmur heard  Pulmonary:      Effort: Pulmonary effort is normal       Breath sounds: Normal breath sounds  No wheezing or rales  Abdominal:      General: Bowel sounds are normal  There is no distension  Tenderness: There is no abdominal tenderness  Musculoskeletal:         General: No deformity  Normal range of motion  Cervical back: Normal range of motion  Right lower leg: No edema  Left lower leg: No edema  Feet:      Right foot:      Toenail Condition: Fungal disease present  Skin:     Coloration: Skin is not pale  Findings: No erythema or rash  Neurological:      Mental Status: He is alert and oriented to person, place, and time        Motor: No " weakness        Gait: Gait normal    Psychiatric:         Mood and Affect: Mood normal          Judgment: Judgment normal        Ekta Mckinley MD

## 2023-05-10 NOTE — ASSESSMENT & PLAN NOTE
Patient blood pressure today is slightly high at 150/90 but he feels he is under stress which might of triggered it but I would like to monitor it closely recheck in few weeks time meanwhile recommend patient to cut back on salt intake lose weight

## 2023-05-10 NOTE — ASSESSMENT & PLAN NOTE
Patient is being managed by the pain management regarding low back pain for which she had received injections also

## 2023-05-10 NOTE — ASSESSMENT & PLAN NOTE
Patient with upper respiratory tract infection symptoms exam shows mild redness in the throat area question mild exudate  Will monitor if necessary start him on Z-Alex

## 2023-05-10 NOTE — ASSESSMENT & PLAN NOTE
Emphasize regarding diet exercise cutting back carbohydrate intake lifestyle modification lose weight

## 2023-05-10 NOTE — ASSESSMENT & PLAN NOTE
Patient is currently taking atorvastatin 10 mg Labs noted we will get a repeat level done in next couple of months time

## 2023-05-16 ENCOUNTER — OFFICE VISIT (OUTPATIENT)
Dept: PAIN MEDICINE | Facility: CLINIC | Age: 58
End: 2023-05-16

## 2023-05-16 ENCOUNTER — TELEPHONE (OUTPATIENT)
Dept: PAIN MEDICINE | Facility: CLINIC | Age: 58
End: 2023-05-16

## 2023-05-16 VITALS
SYSTOLIC BLOOD PRESSURE: 144 MMHG | HEART RATE: 91 BPM | BODY MASS INDEX: 36.51 KG/M2 | WEIGHT: 255 LBS | HEIGHT: 70 IN | DIASTOLIC BLOOD PRESSURE: 78 MMHG

## 2023-05-16 DIAGNOSIS — M79.672 PAIN IN BOTH FEET: ICD-10-CM

## 2023-05-16 DIAGNOSIS — M51.16 LUMBAR DISC DISEASE WITH RADICULOPATHY: Primary | ICD-10-CM

## 2023-05-16 DIAGNOSIS — M79.671 PAIN IN BOTH FEET: ICD-10-CM

## 2023-05-16 NOTE — PROGRESS NOTES
Pain Medicine Follow-Up Note    Assessment:  1  Lumbar disc disease with radiculopathy    2  Pain in both feet        Plan:  Mr Uriel Adam is a pleasant 50-year-old male who presents for follow-up and reevaluation regarding low back pain with radiating symptoms into bilateral lower extremities  We recently performed an L3-L4 LESI which provided greater than 50% relief in his pain for nearly a month and he continues to report relief  He is concerned with the symptoms starting to return  I advised patient that we can consider a repeat injection greater than 3 months out and he wishes to proceed  At this time we will plan for a repeat L3-L4 LESI under fluoroscopy guidance to be scheduled in July 2023 as well as currently placed the patient in chiropractic therapy to assist with management of the ongoing low back pain  All questions answered, patient is agreeable with plan  History of Present Illness:    Javed Herring is a 61 y o  male who presents to [facility] for interval re-evaluation of the above stated pain complaints  The patient has a past medical and chronic pain history as outlined in the assessment section  Patient presents for follow-up and reevaluation regarding low back pain with radiating symptoms into bilateral lower extremities  We previously performed an L3-L4 LESI April 19, 2023  Today reports 3 out of 10 pain that is an intermittent burning, numbness, pins-and-needles sensation  Overall reports 50% relief from the previous epidural     Other than as stated above, the patient denies any interval changes in medications, medical condition, mental condition, symptoms, or allergies since the last office visit  Review of Systems:    Review of Systems   Respiratory: Negative for shortness of breath  Cardiovascular: Negative for chest pain  Gastrointestinal: Negative for constipation, diarrhea, nausea and vomiting     Musculoskeletal: Positive for back pain  Negative for arthralgias, gait problem, joint swelling and myalgias  Skin: Negative for rash  Neurological: Negative for dizziness, seizures and weakness  All other systems reviewed and are negative  Past Medical History:   Diagnosis Date   • BPH (benign prostatic hyperplasia)    • Hypertension        Past Surgical History:   Procedure Laterality Date   • COLONOSCOPY N/A 10/18/2017    Procedure: COLONOSCOPY;  Surgeon: Davonte Fontenot MD;  Location: [place] [address] GI LAB; Service: Gastroenterology   • EPIDURAL BLOCK INJECTION Left 9/16/2021    Procedure: L3 L4 transforaminal epidural steroid injection ( [id number]);   Surgeon: Sindi Jacobson MD;  Location: [facility] MAIN OR;  Service: Pain Management    • HEMORRHOID SURGERY     • LUMBAR EPIDURAL INJECTION N/A 4/19/2023    Procedure: L3 L4 LUMBAR epidural steroid injection [id number];  Surgeon: Toya Olsen DO;  Location: [place] [address] MAIN OR;  Service: Pain Management        Family History   Problem Relation Age of Onset   • Heart disease Mother    • Cancer Father         stomach   • Hypertension Father    • Stomach cancer Father    • Colon cancer Maternal Aunt        Social History     Occupational History   • Not on file   Tobacco Use   • Smoking status: Never   • Smokeless tobacco: Never   Vaping Use   • Vaping Use: Never used   Substance and Sexual Activity   • Alcohol use: Not Currently   • Drug use: No   • Sexual activity: Not on file         Current Outpatient Medications:   •  atorvastatin (LIPITOR) 10 mg tablet, Take 1 tablet (10 mg total) by mouth daily at bedtime, Disp: 90 tablet, Rfl: 0  •  cholecalciferol (VITAMIN D3) 1,000 units tablet, Take 1,000 Units by mouth daily, Disp: , Rfl:   •  doxazosin (CARDURA) 4 mg tablet, Take 4 mg by mouth daily at bedtime, Disp: , Rfl:   •  DULoxetine (CYMBALTA) 30 mg delayed release capsule, TAKE 1 CAPSULE DAILY, Disp: 30 capsule, Rfl: 1  •  finasteride (PROSCAR) 5 mg tablet, , Disp: , Rfl:   •  tamsulosin (FLOMAX) "0 4 mg, Take 0 4 mg by mouth daily with dinner, Disp: , Rfl:     No Known Allergies    Physical Exam:    /78   Pulse 91   Ht 5' 10\" (1 778 m)   Wt 116 kg (255 lb)   BMI 36 59 kg/m²     Constitutional:normal, well developed, well nourished, alert, in no distress and non-toxic and no overt pain behavior  Eyes:anicteric  HEENT:grossly intact  Neck:supple, symmetric, trachea midline and no masses   Pulmonary:even and unlabored  Cardiovascular:No edema or pitting edema present  Skin:Normal without rashes or lesions and well hydrated  Psychiatric:Mood and affect appropriate  Neurologic:Cranial Nerves II-XII grossly intact  Musculoskeletal:normal      Imaging  No orders to display         No orders of the defined types were placed in this encounter      "

## 2023-05-16 NOTE — TELEPHONE ENCOUNTER
Scheduled patient for LESI 7/12/23  Patient denies RX blood thinners/ NSAIDS  Nothing to eat or drink 1 hour prior to procedure  Needs to arrange transportation  Proper clothing for procedure  No vaccines 2 weeks prior or after procedure  If ill or place on antibiotics, please call to reschedule

## 2023-05-17 ENCOUNTER — OFFICE VISIT (OUTPATIENT)
Dept: PODIATRY | Facility: CLINIC | Age: 58
End: 2023-05-17

## 2023-05-17 VITALS
HEIGHT: 70 IN | SYSTOLIC BLOOD PRESSURE: 144 MMHG | BODY MASS INDEX: 36.51 KG/M2 | RESPIRATION RATE: 16 BRPM | WEIGHT: 255 LBS | DIASTOLIC BLOOD PRESSURE: 78 MMHG

## 2023-05-17 DIAGNOSIS — M20.42 HAMMER TOES OF BOTH FEET: ICD-10-CM

## 2023-05-17 DIAGNOSIS — S90.211A CONTUSION OF RIGHT GREAT TOE WITH DAMAGE TO NAIL, INITIAL ENCOUNTER: ICD-10-CM

## 2023-05-17 DIAGNOSIS — M79.671 PAIN IN BOTH FEET: Primary | ICD-10-CM

## 2023-05-17 DIAGNOSIS — M79.672 PAIN IN BOTH FEET: Primary | ICD-10-CM

## 2023-05-17 DIAGNOSIS — M20.41 HAMMER TOES OF BOTH FEET: ICD-10-CM

## 2023-05-17 DIAGNOSIS — B35.1 ONYCHOMYCOSIS: ICD-10-CM

## 2023-05-17 DIAGNOSIS — L60.0 INGROWN TOENAIL: ICD-10-CM

## 2023-05-17 NOTE — PROGRESS NOTES
Assessment/Plan:  Pain upon ambulation   Hammertoe formation   Pes planus   Need for orthotics   Ingrown toenail   Paronychia   Acquired deformity foot bilateral   Acquired pes planus bilateral   History of contusion right big toe     Plan    Chart reviewed   Foot exam performed   Patient educated on condition  Roc Lowery will use orthotics daily   Patient needs new orthotics   His feet have been casted for these today  [facility] will stretch   All nails debrided without pain or complication             Diagnoses and all orders for this visit:     Pain in both feet     Onychomycosis     Hammer toes of both feet     Ingrown toenail     Hammer toe of right foot     Acquired deformity foot bilateral        Acquired pes planus             Subjective:  Patient has pain upon ambulation   He has pain in his toes with ambulation   He has painful hammertoes   He uses orthotics daily   No history of trauma   Patient believes his orthotics are no longer working  [facility] is requesting new orthotics     No Known Allergies        Current Outpatient Medications:   •  cholecalciferol (VITAMIN D3) 1,000 units tablet, Take 1,000 Units by mouth daily, Disp: , Rfl:   •  Cholecalciferol (VITAMIN D3) 08470 units CAPS, Take by mouth once a week, Disp: , Rfl:   •  diclofenac sodium (VOLTAREN) 1 %, Apply 4 g topically 4 (four) times a day (Patient taking differently: Apply 4 g topically 4 (four) times a day ), Disp: 4 Tube, Rfl: 1  •  doxazosin (CARDURA) 4 mg tablet, Take 4 mg by mouth daily at bedtime, Disp: , Rfl:   •  meloxicam (MOBIC) 7 5 mg tablet, Take 1 tablet (7 5 mg total) by mouth daily for 10 days (Patient not taking: Reported on 11/20/2019), Disp: 10 tablet, Rfl: 0  •  tamsulosin (FLOMAX) 0 4 mg, Take 0 4 mg by mouth daily with dinner, Disp: , Rfl:            Patient Active Problem List   Diagnosis   • Plantar fasciitis   • Pain in both feet   • Congenital pes planus of right foot   • Congenital pes planus of left foot   • Tinea pedis of both feet   • Dark stools             Patient ID: Jose Olson is a 57 y  o  male      HPI     The following portions of the patient's history were reviewed and updated as appropriate:      family history includes Cancer in his father; Colon cancer in his maternal aunt; Heart disease in his mother; Hypertension in his father; Stomach cancer in his father        reports that he has never smoked  He has never used smokeless tobacco  He reports that he drinks alcohol  He reports that he does not use drugs       Objective:  Patient's shoes and socks removed    Foot ExamPhysical Exam          Pulses palpable bilateral PT DP and PT pulses 2/4 bilateral  Significant pes planus bilateral, there is decreased  of the arch on weight-bearing  Right 2nd digit there is medial deviation of the PIPJ mild pain on palpation of 2nd MPJ mild swelling  Severe hallux limitus bilateral left worse than right less than 15° of dorsiflexion 1st ray  No hypermobility of the 1st ray  Muscle strength 5/5 all groups bilateral feet and ankles  Moderate equinus bilateral 5° dorsiflexion knee flexed and extended  Negative edema negative erythema no signs of infection  Nails are thickened discolored dystrophic mild onychomycosis  Ingrown nail both borders bilateral hallux     Ingrown right hallux medial border   Mild hyperkeratotic lesion on the medial border but no sign of infection  Mild pain on palpation plantar medial heel bilateral  Some rigid hammertoes 2nd digit bilateral    Right hallux demonstrates dried subungual hematoma    No evidence of lysis or cellulitis

## 2023-05-24 DIAGNOSIS — G89.4 CHRONIC PAIN SYNDROME: ICD-10-CM

## 2023-05-24 DIAGNOSIS — M51.16 LUMBAR DISC DISEASE WITH RADICULOPATHY: ICD-10-CM

## 2023-05-24 DIAGNOSIS — M51.26 PROTRUSION OF LUMBAR INTERVERTEBRAL DISC: ICD-10-CM

## 2023-05-24 DIAGNOSIS — M48.061 LUMBAR FORAMINAL STENOSIS: ICD-10-CM

## 2023-05-25 RX ORDER — DULOXETIN HYDROCHLORIDE 30 MG/1
CAPSULE, DELAYED RELEASE ORAL
Qty: 30 CAPSULE | Refills: 1 | Status: SHIPPED | OUTPATIENT
Start: 2023-05-25

## 2023-06-06 LAB
ALBUMIN SERPL-MCNC: 4.5 G/DL (ref 3.8–4.9)
ALBUMIN/GLOB SERPL: 2 {RATIO} (ref 1.2–2.2)
ALP SERPL-CCNC: 92 IU/L (ref 44–121)
ALT SERPL-CCNC: 19 IU/L (ref 0–44)
AST SERPL-CCNC: 12 IU/L (ref 0–40)
BASOPHILS # BLD AUTO: 0.1 X10E3/UL (ref 0–0.2)
BASOPHILS NFR BLD AUTO: 1 %
BILIRUB SERPL-MCNC: 0.7 MG/DL (ref 0–1.2)
BUN SERPL-MCNC: 13 MG/DL (ref 6–24)
BUN/CREAT SERPL: 12 (ref 9–20)
CALCIUM SERPL-MCNC: 9 MG/DL (ref 8.7–10.2)
CHLORIDE SERPL-SCNC: 106 MMOL/L (ref 96–106)
CHOLEST SERPL-MCNC: 131 MG/DL (ref 100–199)
CHOLEST/HDLC SERPL: 3.5 RATIO (ref 0–5)
CO2 SERPL-SCNC: 21 MMOL/L (ref 20–29)
CREAT SERPL-MCNC: 1.08 MG/DL (ref 0.76–1.27)
EGFR: 80 ML/MIN/1.73
EOSINOPHIL # BLD AUTO: 0.2 X10E3/UL (ref 0–0.4)
EOSINOPHIL NFR BLD AUTO: 4 %
ERYTHROCYTE [DISTWIDTH] IN BLOOD BY AUTOMATED COUNT: 13.3 % (ref 11.6–15.4)
EST. AVERAGE GLUCOSE BLD GHB EST-MCNC: 105 MG/DL
GLOBULIN SER-MCNC: 2.2 G/DL (ref 1.5–4.5)
GLUCOSE SERPL-MCNC: 90 MG/DL (ref 70–99)
HBA1C MFR BLD: 5.3 % (ref 4.8–5.6)
HCT VFR BLD AUTO: 43 % (ref 37.5–51)
HDLC SERPL-MCNC: 37 MG/DL
HGB BLD-MCNC: 15 G/DL (ref 13–17.7)
IMM GRANULOCYTES # BLD: 0 X10E3/UL (ref 0–0.1)
IMM GRANULOCYTES NFR BLD: 0 %
LDLC SERPL CALC-MCNC: 79 MG/DL (ref 0–99)
LYMPHOCYTES # BLD AUTO: 2.1 X10E3/UL (ref 0.7–3.1)
LYMPHOCYTES NFR BLD AUTO: 35 %
MCH RBC QN AUTO: 29.9 PG (ref 26.6–33)
MCHC RBC AUTO-ENTMCNC: 34.9 G/DL (ref 31.5–35.7)
MCV RBC AUTO: 86 FL (ref 79–97)
MONOCYTES # BLD AUTO: 0.4 X10E3/UL (ref 0.1–0.9)
MONOCYTES NFR BLD AUTO: 7 %
NEUTROPHILS # BLD AUTO: 3.2 X10E3/UL (ref 1.4–7)
NEUTROPHILS NFR BLD AUTO: 53 %
PLATELET # BLD AUTO: 153 X10E3/UL (ref 150–450)
POTASSIUM SERPL-SCNC: 3.8 MMOL/L (ref 3.5–5.2)
PROT SERPL-MCNC: 6.7 G/DL (ref 6–8.5)
RBC # BLD AUTO: 5.02 X10E6/UL (ref 4.14–5.8)
SL AMB VLDL CHOLESTEROL CALC: 15 MG/DL (ref 5–40)
SODIUM SERPL-SCNC: 142 MMOL/L (ref 134–144)
TRIGL SERPL-MCNC: 74 MG/DL (ref 0–149)
TSH SERPL DL<=0.005 MIU/L-ACNC: 1.88 UIU/ML (ref 0.45–4.5)
WBC # BLD AUTO: 6 X10E3/UL (ref 3.4–10.8)

## 2023-07-12 ENCOUNTER — HOSPITAL ENCOUNTER (OUTPATIENT)
Facility: AMBULARY SURGERY CENTER | Age: 58
Setting detail: OUTPATIENT SURGERY
Discharge: HOME | End: 2023-07-12
Attending: PHYSICAL MEDICINE & REHABILITATION | Admitting: PHYSICAL MEDICINE & REHABILITATION
Payer: COMMERCIAL

## 2023-07-12 ENCOUNTER — HOSPITAL ENCOUNTER (OUTPATIENT)
Dept: RADIOLOGY | Facility: HOSPITAL | Age: 58
Setting detail: OUTPATIENT SURGERY
Discharge: HOME | End: 2023-07-12
Payer: COMMERCIAL

## 2023-07-12 VITALS
HEART RATE: 75 BPM | RESPIRATION RATE: 18 BRPM | SYSTOLIC BLOOD PRESSURE: 145 MMHG | TEMPERATURE: 97.9 F | DIASTOLIC BLOOD PRESSURE: 87 MMHG | OXYGEN SATURATION: 94 %

## 2023-07-12 DIAGNOSIS — Z92.241 S/P EPIDURAL STEROID INJECTION: ICD-10-CM

## 2023-07-12 PROCEDURE — 62323 NJX INTERLAMINAR LMBR/SAC: CPT | Performed by: PHYSICAL MEDICINE & REHABILITATION

## 2023-07-12 RX ORDER — METHYLPREDNISOLONE ACETATE 40 MG/ML
INJECTION, SUSPENSION INTRA-ARTICULAR; INTRALESIONAL; INTRAMUSCULAR; SOFT TISSUE AS NEEDED
Status: DISCONTINUED | OUTPATIENT
Start: 2023-07-12 | End: 2023-07-12 | Stop reason: HOSPADM

## 2023-07-12 RX ORDER — LIDOCAINE HYDROCHLORIDE 10 MG/ML
INJECTION, SOLUTION EPIDURAL; INFILTRATION; INTRACAUDAL; PERINEURAL AS NEEDED
Status: DISCONTINUED | OUTPATIENT
Start: 2023-07-12 | End: 2023-07-12 | Stop reason: HOSPADM

## 2023-07-12 NOTE — OP NOTE
OPERATIVE REPORT  PATIENT NAME: Flavia Olson    :  1965  MRN: [id number]  Pt Location: [facility] MINOR/PAIN ROOM 01    SURGERY DATE: 2023    Surgeon(s) and Role:     * Lu Garland DO - Primary    Preop Diagnosis:  Lumbar disc disease with radiculopathy [M51.16]    Post-Op Diagnosis Codes:     * Lumbar disc disease with radiculopathy [M51.16]    Procedure(s):  L3 L4  LUMBAR epidural steroid injection ([id number])    Lumbar epidural  Indication:  Back and radiating leg pain  Preoperative diagnosis:  Lumbar radiculitis  Postoperative diagnosis:  Lumbar radiculitis    Procedure: Fluoroscopically-guided L3-L4 interlaminar epidural steroid injection under fluoroscopy    EBL:  none  Specimens:  not applicable    After discussing the risks, benefits, and alternatives to the procedure, the patient expressed understanding and wished to proceed. The patient was brought to the fluoroscopy suite and placed in the prone position. A procedural pause was conducted to verify:  correct patient identity, procedure to be performed and as applicable, correct side and site, correct patient position, and availability of implants, special equipment and special requirements. After identifying the L3-L4 space fluoroscopically, the skin was sterilely prepped and draped in the usual fashion using Chloraprep skin prep. The skin and subcutaneous tissues were anesthetized with 1% lidocaine. Utilizing a loss of resistance technique and intermittent fluoroscopic guidance, a 3.5 inch 20-gauge Tuohy needle was advanced into the epidural space. Proper needle positioning was confirmed using multiple fluoroscopic views. After negative aspiration, Omnipaque 240 contrast was injected confirming epidural spread without evidence of intravascular or intrathecal spread. A 4 ml solution consisting of 80 mg Depo-Medrol in sterile saline was injected slowly and incrementally into the epidural space.   Following the injection the needle was withdrawn slightly and flushed with 1% buffered lidocaine as it was fully extracted. The patient tolerated the procedure well and there were no apparent complications. After appropriate observation, the patient was dismissed from the clinic in good condition under their own power.         SIGNATURE: Jered Lewis,   DATE: July 12, 2023  TIME: 9:06 AM

## 2023-07-12 NOTE — H&P
History of Present Illness: The patient is a 61 y.o. male who presents with complaints of low back pain    Past Medical History:   Diagnosis Date   • BPH (benign prostatic hyperplasia)    • Hypertension        Past Surgical History:   Procedure Laterality Date   • COLONOSCOPY N/A 10/18/2017    Procedure: COLONOSCOPY;  Surgeon: Huy Yip MD;  Location: [address] [place] [address] GI LAB; Service: Gastroenterology   • EPIDURAL BLOCK INJECTION Left 9/16/2021    Procedure: L3 L4 transforaminal epidural steroid injection ( [id number]); Surgeon: June Vasques MD;  Location: [facility] MAIN OR;  Service: Pain Management    • HEMORRHOID SURGERY     • LUMBAR EPIDURAL INJECTION N/A 4/19/2023    Procedure: L3 L4 LUMBAR epidural steroid injection [id number];  Surgeon: Armando Mahmood DO;  Location: [facility] MAIN OR;  Service: Pain Management        No current facility-administered medications for this encounter.     No Known Allergies    Physical Exam:   Vitals:    07/12/23 0834   BP: 134/80   Pulse: 86   Resp: 18   Temp: 97.9 °F (36.6 °C)     General: Awake, Alert, Oriented x 3, Mood and affect appropriate  Respiratory: Respirations even and unlabored  Cardiovascular: Peripheral pulses intact; no edema  Musculoskeletal Exam: Tenderness palpation bilateral lumbar paraspinals    ASA Score: 2    Patient/Chart Verification  Patient ID Verified: Verbal, Armband  ID Band Applied: Yes  Consents Confirmed: Procedural  H&P( within 30 days) Verified: Yes  Interval H&P(within 24 hr) Complete (required for Outpatients and Surgery Admit only): Yes  Beta Blocker given : No  Pre-op Lab/Test Results Available: N/A  Does Patient Have a Prosthetic Device/Implant: No    Assessment: Lumbar radiculopathy  Plan: L3-L4 LESI

## 2023-07-12 NOTE — DISCHARGE INSTRUCTIONS
Epidural Steroid Injection   WHAT YOU NEED TO KNOW:   An epidural steroid injection (KESHAV) is a procedure to inject steroid medicine into the epidural space. The epidural space is between your spinal cord and vertebrae. Steroids reduce inflammation and fluid buildup in your spine that may be causing pain. You may be given pain medicine along with the steroids. ACTIVITY  Do not drive or operate machinery today. No strenuous activity today - bending, lifting, etc.  You may resume normal activites starting tomorrow - start slowly and as tolerated. You may shower today, but no tub baths or hot tubs. You may have numbness for several hours from the local anesthetic. Please use caution and common sense, especially with weight-bearing activities. CARE OF THE INJECTION SITE  If you have soreness or pain, apply ice to the area today (20 minutes on/20 minutes off). Starting tomorrow, you may use warm, moist heat or ice if needed. You may have an increase or change in your discomfort for 36-48 hours after your treatment. Apply ice and continue with any pain medication you have been prescribed. Notify the [facility] if you have any of the following: redness, drainage, swelling, headache, stiff neck or fever above 100°F.    SPECIAL INSTRUCTIONS  Our office will contact you in approximately 7 days for a progress report. MEDICATIONS  Continue to take all routine medications. Our office may have instructed you to hold some medications. As no general anesthesia was used in today's procedure, you should not experience any side effects related to anesthesia. If you are diabetic, the steroids used in today's injection may temporarily increase your blood sugar levels after the first few days after your injection. Please keep a close eye on your sugars and alert the doctor who manages your diabetes if your sugars are significantly high from your baseline or you are symptomatic.      If you have a problem specifically related to your procedure, please call our office at [phone]. Problems not related to your procedure should be directed to your primary care physician.

## 2023-07-16 DIAGNOSIS — E78.5 DYSLIPIDEMIA: ICD-10-CM

## 2023-07-17 DIAGNOSIS — M51.26 PROTRUSION OF LUMBAR INTERVERTEBRAL DISC: ICD-10-CM

## 2023-07-17 DIAGNOSIS — G89.4 CHRONIC PAIN SYNDROME: ICD-10-CM

## 2023-07-17 DIAGNOSIS — M51.16 LUMBAR DISC DISEASE WITH RADICULOPATHY: ICD-10-CM

## 2023-07-17 DIAGNOSIS — M48.061 LUMBAR FORAMINAL STENOSIS: ICD-10-CM

## 2023-07-17 RX ORDER — ATORVASTATIN CALCIUM 10 MG/1
TABLET, FILM COATED ORAL
Qty: 30 TABLET | Refills: 6 | Status: SHIPPED | OUTPATIENT
Start: 2023-07-17

## 2023-07-17 RX ORDER — DULOXETIN HYDROCHLORIDE 30 MG/1
CAPSULE, DELAYED RELEASE ORAL
Qty: 30 CAPSULE | Refills: 1 | Status: SHIPPED | OUTPATIENT
Start: 2023-07-17

## 2023-07-19 ENCOUNTER — OFFICE VISIT (OUTPATIENT)
Dept: PODIATRY | Facility: CLINIC | Age: 58
End: 2023-07-19
Payer: COMMERCIAL

## 2023-07-19 ENCOUNTER — TELEPHONE (OUTPATIENT)
Dept: RADIOLOGY | Facility: MEDICAL CENTER | Age: 58
End: 2023-07-19

## 2023-07-19 VITALS
WEIGHT: 255 LBS | DIASTOLIC BLOOD PRESSURE: 87 MMHG | RESPIRATION RATE: 16 BRPM | BODY MASS INDEX: 36.51 KG/M2 | SYSTOLIC BLOOD PRESSURE: 145 MMHG | HEIGHT: 70 IN

## 2023-07-19 DIAGNOSIS — M20.41 HAMMER TOES OF BOTH FEET: ICD-10-CM

## 2023-07-19 DIAGNOSIS — L60.0 INGROWN TOENAIL: ICD-10-CM

## 2023-07-19 DIAGNOSIS — M20.42 HAMMER TOES OF BOTH FEET: ICD-10-CM

## 2023-07-19 DIAGNOSIS — S90.211D CONTUSION OF RIGHT GREAT TOE WITH DAMAGE TO NAIL, SUBSEQUENT ENCOUNTER: ICD-10-CM

## 2023-07-19 DIAGNOSIS — B35.1 ONYCHOMYCOSIS: ICD-10-CM

## 2023-07-19 DIAGNOSIS — M79.672 PAIN IN BOTH FEET: Primary | ICD-10-CM

## 2023-07-19 DIAGNOSIS — M79.671 PAIN IN BOTH FEET: Primary | ICD-10-CM

## 2023-07-19 PROCEDURE — 99213 OFFICE O/P EST LOW 20 MIN: CPT | Performed by: PODIATRIST

## 2023-07-19 NOTE — PROGRESS NOTES
Assessment/Plan:  Pain upon ambulation.  Hammertoe formation.  Pes planus.  Need for orthotics.  Ingrown toenail.  Paronychia.  Acquired deformity foot bilateral.  Acquired pes planus bilateral.  History of contusion right big toe     Plan. Chart reviewed.  Foot exam performed.  Patient educated on condition. [place] Members will use orthotics daily.  Hermann Child will stretch.  All nails debrided without pain or complication.   Patient advised on aftercare.            Diagnoses and all orders for this visit:     Pain in both feet     Onychomycosis     Hammer toes of both feet     Ingrown toenail     Hammer toe of right foot     Acquired deformity foot bilateral        Acquired pes planus.            Subjective:  Patient has pain upon ambulation.  He has pain in his toes with ambulation.  He has painful hammertoes.  He uses orthotics daily.  No history of trauma.  Patient believes his orthotics are no longer working. Hermann Child is requesting new orthotics     No Known Allergies        Current Outpatient Medications:   •  cholecalciferol (VITAMIN D3) 1,000 units tablet, Take 1,000 Units by mouth daily, Disp: , Rfl:   •  Cholecalciferol (VITAMIN D3) 03038 units CAPS, Take by mouth once a week, Disp: , Rfl:   •  diclofenac sodium (VOLTAREN) 1 %, Apply 4 g topically 4 (four) times a day (Patient taking differently: Apply 4 g topically 4 (four) times a day ), Disp: 4 Tube, Rfl: 1  •  doxazosin (CARDURA) 4 mg tablet, Take 4 mg by mouth daily at bedtime, Disp: , Rfl:   •  meloxicam (MOBIC) 7.5 mg tablet, Take 1 tablet (7.5 mg total) by mouth daily for 10 days (Patient not taking: Reported on 11/20/2019), Disp: 10 tablet, Rfl: 0  •  tamsulosin (FLOMAX) 0.4 mg, Take 0.4 mg by mouth daily with dinner, Disp: , Rfl:            Patient Active Problem List   Diagnosis   • Plantar fasciitis   • Pain in both feet   • Congenital pes planus of right foot   • Congenital pes planus of left foot   • Tinea pedis of both feet   • Dark stools             Patient ID: Jose Olson is a 57 y. o. male.     HPI     The following portions of the patient's history were reviewed and updated as appropriate:      family history includes Cancer in his father; Colon cancer in his maternal aunt; Heart disease in his mother; Hypertension in his father; Stomach cancer in his father.       reports that he has never smoked. He has never used smokeless tobacco. He reports that he drinks alcohol. He reports that he does not use drugs.      Objective:  Patient's shoes and socks removed.   Foot ExamPhysical Exam          Pulses palpable bilateral PT DP and PT pulses 2/4 bilateral  Significant pes planus bilateral, there is decreased  of the arch on weight-bearing  Right 2nd digit there is medial deviation of the PIPJ mild pain on palpation of 2nd MPJ mild swelling  Severe hallux limitus bilateral left worse than right less than 15° of dorsiflexion 1st ray  No hypermobility of the 1st ray  Muscle strength 5/5 all groups bilateral feet and ankles  Moderate equinus bilateral 5° dorsiflexion knee flexed and extended  Negative edema negative erythema no signs of infection  Nails are thickened discolored dystrophic mild onychomycosis  Ingrown nail both borders bilateral hallux     Ingrown right hallux medial border.  Mild hyperkeratotic lesion on the medial border but no sign of infection  Mild pain on palpation plantar medial heel bilateral  Some rigid hammertoes 2nd digit bilateral     Right hallux demonstrates dried subungual hematoma.   No evidence of lysis or cellulitis

## 2023-08-11 NOTE — PROGRESS NOTES
ANNUAL    Name: Becky Souza      : 1965      MRN: [id number]  Encounter Provider: Avila Ann MD  Encounter Date: 2023   Encounter department: [address] [place] [address] Road     1. Shortness of breath  Assessment & Plan:  Patient has been experiencing shortness of breath for past 1 year. It is mostly with exertion. Denies any symptoms of chest pains palpitations sweating. We attributed mainly a think to the weight but there is a family history several members with heart problems according to the patient we will refer the patient to the cardiologist for further evaluation and treatment. Exam is unremarkable no edema no hepatojugular reflux. Patient has no paroxysmal nocturnal dyspnea. Orders:  -     Ambulatory referral to Cardiology; Future    2. Benign prostatic hyperplasia without lower urinary tract symptoms  Assessment & Plan:  Patient with BPH currently taking Cardura and Flomax. Patient was also prescribed finasteride but at present time is not taking it. According to the patient the urologist was also concerned with a PSA level which I do not see report of the same we will try to get it. 3. Class 2 obesity due to excess calories without serious comorbidity with body mass index (BMI) of 36.0 to 36.9 in adult  Assessment & Plan:  Patient with a BMI of 36.93 recommend very strongly with lifestyle modification cutting back calorie intake carbohydrate intake. Because of his back pain patient has been having difficulty with with exercises. 4. Dyslipidemia  Assessment & Plan:  Labs shows his cholesterol is at 131 triglycerides 74 HDL 37 LDL is 79 currently patient taking atorvastatin 10 mg we will continue with the same dose.       5. Primary hypertension  Assessment & Plan:  Patient with a history of hypertension currently taking Cardura 4 mg at bedtime which appears to be also helping with his prostate we will continue to with the same monitor today's blood pressure 116/74. 6. Lumbar radiculopathy  Assessment & Plan:  Patient with lumbar radiculopathy received 2 injections in the past for the same scheduled for the third 1 in December he has been also started on Cymbalta 30 mg daily        Depression Screening and Follow-up Plan: Patient was screened for depression during today's encounter. They screened negative with a PHQ-2 score of 1. Subjective     Patient is coming for evaluation regarding symptoms of shortness of breath he has been noticing for the past 1 year. No paroxysmal nocturnal dyspnea. Simple tasks does not bother him but he cannot walk long distance but he also has a history of low back pain radiating down both lower extremities into the thigh area being followed by the pain management physician. Patient had received 2 injections so far in the back past for about good second 1 he was not sure and patient is scheduled for the third injection in December. He has been also started on Cymbalta 30 mg daily which appears to have helped but he ran out of the medication he is seeing the pain management physician tomorrow. Medications reviewed. Review of Systems   Constitutional: Negative for chills and fever. HENT: Negative for ear pain and sore throat. Eyes: Negative for pain and visual disturbance. Respiratory: Positive for shortness of breath. Negative for cough. Cardiovascular: Negative for chest pain and palpitations. Gastrointestinal: Negative for abdominal pain and vomiting. Genitourinary: Negative for dysuria and hematuria. Musculoskeletal: Positive for arthralgias and back pain. Skin: Negative for color change and rash. Neurological: Negative for seizures and syncope. All other systems reviewed and are negative.       Past Medical History:   Diagnosis Date   • BPH (benign prostatic hyperplasia)    • Hypertension      Past Surgical History:   Procedure Laterality Date   • COLONOSCOPY N/A 10/18/2017 Procedure: COLONOSCOPY;  Surgeon: Huy Yip MD;  Location: [address] [place] [address] GI LAB; Service: Gastroenterology   • EPIDURAL BLOCK INJECTION Left 9/16/2021    Procedure: L3 L4 transforaminal epidural steroid injection ( [id number]); Surgeon: June Vasques MD;  Location: [facility] MAIN OR;  Service: Pain Management    • EPIDURAL BLOCK INJECTION N/A 7/12/2023    Procedure: L3 L4  LUMBAR epidural steroid injection ([id number]);   Surgeon: Armando Mahmood DO;  Location: [facility] MAIN OR;  Service: Pain Management    • HEMORRHOID SURGERY     • LUMBAR EPIDURAL INJECTION N/A 4/19/2023    Procedure: L3 L4 LUMBAR epidural steroid injection [id number];  Surgeon: Armando Mahmood DO;  Location: [facility] MAIN OR;  Service: Pain Management      Family History   Problem Relation Age of Onset   • Heart disease Mother    • Cancer Father         stomach   • Hypertension Father    • Stomach cancer Father    • Colon cancer Maternal Aunt      Social History     Socioeconomic History   • Marital status: Single     Spouse name: None   • Number of children: None   • Years of education: None   • Highest education level: None   Occupational History   • None   Tobacco Use   • Smoking status: Never   • Smokeless tobacco: Never   Vaping Use   • Vaping Use: Never used   Substance and Sexual Activity   • Alcohol use: Not Currently   • Drug use: No   • Sexual activity: None   Other Topics Concern   • None   Social History Narrative   • None     Social Determinants of Health     Financial Resource Strain: Not on file   Food Insecurity: Not on file   Transportation Needs: Not on file   Physical Activity: Not on file   Stress: Not on file   Social Connections: Not on file   Intimate Partner Violence: Not on file   Housing Stability: Not on file     Current Outpatient Medications on File Prior to Visit   Medication Sig   • atorvastatin (LIPITOR) 10 mg tablet TAKE 1 TABLET DAILY AT BEDTIME   • cholecalciferol (VITAMIN D3) 1,000 units tablet Take 1,000 Units by mouth daily   • doxazosin (CARDURA) 4 mg tablet Take 4 mg by mouth daily at bedtime   • DULoxetine (CYMBALTA) 30 mg delayed release capsule TAKE 1 CAPSULE DAILY   • finasteride (PROSCAR) 5 mg tablet    • tamsulosin (FLOMAX) 0.4 mg Take 0.4 mg by mouth daily with dinner     No Known Allergies  Immunization History   Administered Date(s) Administered   • COVID-19 PFIZER VACCINE 0.3 ML IM 04/04/2021, 04/28/2021   • INFLUENZA 10/18/2022   • Influenza, recombinant, quadrivalent,injectable, preservative free 10/18/2022       Objective     /74 (BP Location: Right arm, Patient Position: Sitting, Cuff Size: Large)   Pulse 92   Ht 5' 10" (1.778 m)   Wt 117 kg (257 lb 6.4 oz)   SpO2 96%   BMI 36.93 kg/m²     Physical Exam  Vitals and nursing note reviewed. Constitutional:       Appearance: Normal appearance. He is obese. Cardiovascular:      Rate and Rhythm: Normal rate and regular rhythm. Heart sounds: Normal heart sounds. Pulmonary:      Effort: Pulmonary effort is normal.      Breath sounds: Normal breath sounds. Abdominal:      General: Abdomen is flat. Palpations: Abdomen is soft. Musculoskeletal:      Right lower leg: No edema. Left lower leg: No edema. Comments: SLR about 40 degrees both side   Skin:     General: Skin is warm and dry. Neurological:      Mental Status: He is alert and oriented to person, place, and time.        Celestina Wiggins MD

## 2023-08-14 ENCOUNTER — OFFICE VISIT (OUTPATIENT)
Dept: FAMILY MEDICINE CLINIC | Facility: CLINIC | Age: 58
End: 2023-08-14
Payer: COMMERCIAL

## 2023-08-14 VITALS
HEIGHT: 70 IN | DIASTOLIC BLOOD PRESSURE: 74 MMHG | WEIGHT: 257.4 LBS | SYSTOLIC BLOOD PRESSURE: 116 MMHG | BODY MASS INDEX: 36.85 KG/M2 | OXYGEN SATURATION: 96 % | HEART RATE: 92 BPM

## 2023-08-14 DIAGNOSIS — R06.02 SHORTNESS OF BREATH: Primary | ICD-10-CM

## 2023-08-14 DIAGNOSIS — E78.5 DYSLIPIDEMIA: ICD-10-CM

## 2023-08-14 DIAGNOSIS — N40.0 BENIGN PROSTATIC HYPERPLASIA WITHOUT LOWER URINARY TRACT SYMPTOMS: ICD-10-CM

## 2023-08-14 DIAGNOSIS — M54.16 LUMBAR RADICULOPATHY: ICD-10-CM

## 2023-08-14 DIAGNOSIS — I10 PRIMARY HYPERTENSION: ICD-10-CM

## 2023-08-14 DIAGNOSIS — E66.09 CLASS 2 OBESITY DUE TO EXCESS CALORIES WITHOUT SERIOUS COMORBIDITY WITH BODY MASS INDEX (BMI) OF 36.0 TO 36.9 IN ADULT: ICD-10-CM

## 2023-08-14 PROCEDURE — 99214 OFFICE O/P EST MOD 30 MIN: CPT | Performed by: INTERNAL MEDICINE

## 2023-08-14 NOTE — ASSESSMENT & PLAN NOTE
Patient has been experiencing shortness of breath for past 1 year. It is mostly with exertion. Denies any symptoms of chest pains palpitations sweating. We attributed mainly a think to the weight but there is a family history several members with heart problems according to the patient we will refer the patient to the cardiologist for further evaluation and treatment. Exam is unremarkable no edema no hepatojugular reflux. Patient has no paroxysmal nocturnal dyspnea.

## 2023-08-14 NOTE — ASSESSMENT & PLAN NOTE
Patient with BPH currently taking Cardura and Flomax. Patient was also prescribed finasteride but at present time is not taking it. According to the patient the urologist was also concerned with a PSA level which I do not see report of the same we will try to get it.

## 2023-08-14 NOTE — ASSESSMENT & PLAN NOTE
Labs shows his cholesterol is at 131 triglycerides 74 HDL 37 LDL is 79 currently patient taking atorvastatin 10 mg we will continue with the same dose.

## 2023-08-14 NOTE — ASSESSMENT & PLAN NOTE
Patient with a BMI of 36.93 recommend very strongly with lifestyle modification cutting back calorie intake carbohydrate intake. Because of his back pain patient has been having difficulty with with exercises.

## 2023-08-14 NOTE — ASSESSMENT & PLAN NOTE
Patient with a history of hypertension currently taking Cardura 4 mg at bedtime which appears to be also helping with his prostate we will continue to with the same monitor today's blood pressure 116/74.

## 2023-08-14 NOTE — ASSESSMENT & PLAN NOTE
Patient with lumbar radiculopathy received 2 injections in the past for the same scheduled for the third 1 in December he has been also started on Cymbalta 30 mg daily

## 2023-08-15 ENCOUNTER — OFFICE VISIT (OUTPATIENT)
Dept: PAIN MEDICINE | Facility: CLINIC | Age: 58
End: 2023-08-15
Payer: COMMERCIAL

## 2023-08-15 ENCOUNTER — APPOINTMENT (OUTPATIENT)
Dept: RADIOLOGY | Facility: CLINIC | Age: 58
End: 2023-08-15
Payer: COMMERCIAL

## 2023-08-15 VITALS
WEIGHT: 259 LBS | SYSTOLIC BLOOD PRESSURE: 129 MMHG | BODY MASS INDEX: 37.16 KG/M2 | TEMPERATURE: 98.6 F | DIASTOLIC BLOOD PRESSURE: 68 MMHG | HEART RATE: 66 BPM

## 2023-08-15 DIAGNOSIS — M48.061 LUMBAR FORAMINAL STENOSIS: ICD-10-CM

## 2023-08-15 DIAGNOSIS — M54.16 LUMBAR RADICULOPATHY: ICD-10-CM

## 2023-08-15 DIAGNOSIS — M54.16 LUMBAR RADICULOPATHY: Primary | ICD-10-CM

## 2023-08-15 PROCEDURE — 72100 X-RAY EXAM L-S SPINE 2/3 VWS: CPT

## 2023-08-15 PROCEDURE — 99214 OFFICE O/P EST MOD 30 MIN: CPT | Performed by: PHYSICAL MEDICINE & REHABILITATION

## 2023-08-15 RX ORDER — DULOXETIN HYDROCHLORIDE 60 MG/1
60 CAPSULE, DELAYED RELEASE ORAL DAILY
Qty: 30 CAPSULE | Refills: 1 | Status: SHIPPED | OUTPATIENT
Start: 2023-08-15

## 2023-08-15 NOTE — PROGRESS NOTES
Pain Medicine Follow-Up Note    Assessment:  1. Lumbar radiculopathy    2. Lumbar foraminal stenosis        Plan:  Orders Placed This Encounter   Procedures   • X-ray lumbar spine 2 or 3 views     Standing Status:   Future     Standing Expiration Date:   8/15/2027     Scheduling Instructions:      Bring along any outside films relating to this procedure. • Ambulatory referral to Physical Therapy     Standing Status:   Future     Standing Expiration Date:   8/15/2024     Referral Priority:   Routine     Referral Type:   Physical Therapy     Referral Reason:   Specialty Services Required     Requested Specialty:   Physical Therapy     Number of Visits Requested:   1     Expiration Date:   8/15/2024       New Medications Ordered This Visit   Medications   • DULoxetine (CYMBALTA) 60 mg delayed release capsule     Sig: Take 1 capsule (60 mg total) by mouth daily     Dispense:  30 capsule     Refill:  1     Mr. Nichole Sanchez is a pleasant 14-year-old male who presents for follow-up and reevaluation regarding low back pain with rating symptoms into the right worse than left lower extremity. We recently performed an L3-L4 LESI which has not provided the same amount of relief as he previously received from April 2023. On further review of MRI which was done in 2021 he did demonstrate multilevel central and foraminal narrowing and I question if the degenerative changes and narrowing has gotten progressively worse. Unfortunately he has not participated in any conservative measures in the last 6 months and at this time we will place the patient in a formal physical therapy program, order updated lumbar x-ray and increase his Cymbalta to 60 mg daily. All questions answered, patient is agreeable with plan. History of Present Illness:    Clayton Mace is a 61 y.o. male who presents to [facility] for interval re-evaluation of the above stated pain complaints.  The patient has a past medical and chronic pain history as outlined in the assessment section. Patient presents for follow-up and reevaluation regarding low back pain with radiating symptoms into the right worse than left lower extremity currently rated 2-4 out of 10 and described as a intermittent burning, throbbing, shooting sensation. We previously performed a L3-L4 lumbar pretarsal injection which she reports only approximately 10% relief in his pain. The first injection we did provided 50% relief but he reports minimal relief this time. Presents for follow-up    Other than as stated above, the patient denies any interval changes in medications, medical condition, mental condition, symptoms, or allergies since the last office visit. Review of Systems:    Review of Systems   Constitutional: Negative for unexpected weight change. HENT: Negative for ear pain. Eyes: Negative for visual disturbance. Respiratory: Negative for shortness of breath and wheezing. Gastrointestinal: Negative for abdominal pain. Musculoskeletal: Positive for back pain. Neurological: Positive for numbness. Negative for weakness. Psychiatric/Behavioral: Negative for decreased concentration. The patient is nervous/anxious. Past Medical History:   Diagnosis Date   • BPH (benign prostatic hyperplasia)    • Hypertension        Past Surgical History:   Procedure Laterality Date   • COLONOSCOPY N/A 10/18/2017    Procedure: COLONOSCOPY;  Surgeon: Kenan Baumann MD;  Location: [address] [place] [address] GI LAB; Service: Gastroenterology   • EPIDURAL BLOCK INJECTION Left 9/16/2021    Procedure: L3 L4 transforaminal epidural steroid injection ( [id number]); Surgeon: Mady Roland MD;  Location: [facility] OR;  Service: Pain Management    • EPIDURAL BLOCK INJECTION N/A 7/12/2023    Procedure: L3 L4  LUMBAR epidural steroid injection ([id number]);   Surgeon: Viky Jane DO;  Location: [facility] OR;  Service: Pain Management    • HEMORRHOID SURGERY     • LUMBAR EPIDURAL INJECTION N/A 4/19/2023    Procedure: L3 L4 LUMBAR epidural steroid injection [id number];  Surgeon: Elisabet Villalta DO;  Location: [facility] MAIN OR;  Service: Pain Management        Family History   Problem Relation Age of Onset   • Heart disease Mother    • Cancer Father         stomach   • Hypertension Father    • Stomach cancer Father    • Colon cancer Maternal Aunt        Social History     Occupational History   • Not on file   Tobacco Use   • Smoking status: Never   • Smokeless tobacco: Never   Vaping Use   • Vaping Use: Never used   Substance and Sexual Activity   • Alcohol use: Not Currently   • Drug use: No   • Sexual activity: Not on file         Current Outpatient Medications:   •  atorvastatin (LIPITOR) 10 mg tablet, TAKE 1 TABLET DAILY AT BEDTIME, Disp: 30 tablet, Rfl: 6  •  cholecalciferol (VITAMIN D3) 1,000 units tablet, Take 1,000 Units by mouth daily, Disp: , Rfl:   •  doxazosin (CARDURA) 4 mg tablet, Take 4 mg by mouth daily at bedtime, Disp: , Rfl:   •  DULoxetine (CYMBALTA) 60 mg delayed release capsule, Take 1 capsule (60 mg total) by mouth daily, Disp: 30 capsule, Rfl: 1  •  tamsulosin (FLOMAX) 0.4 mg, Take 0.4 mg by mouth daily with dinner, Disp: , Rfl:   •  finasteride (PROSCAR) 5 mg tablet, , Disp: , Rfl:     No Known Allergies    Physical Exam:    /68   Pulse 66   Temp 98.6 °F (37 °C)   Wt 117 kg (259 lb)   BMI 37.16 kg/m²     Constitutional:normal, well developed, well nourished, alert, in no distress and non-toxic and no overt pain behavior.   Eyes:anicteric  HEENT:grossly intact  Neck:supple, symmetric, trachea midline and no masses   Pulmonary:even and unlabored  Cardiovascular:No edema or pitting edema present  Skin:Normal without rashes or lesions and well hydrated  Psychiatric:Mood and affect appropriate  Neurologic:Cranial Nerves II-XII grossly intact  Musculoskeletal:normal      Imaging  X-ray lumbar spine 2 or 3 views    (Results Pending)         Orders Placed This Encounter   Procedures   • X-ray lumbar spine 2 or 3 views   • Ambulatory referral to Physical Therapy

## 2023-08-22 ENCOUNTER — EVALUATION (OUTPATIENT)
Dept: PHYSICAL THERAPY | Facility: CLINIC | Age: 58
End: 2023-08-22
Payer: COMMERCIAL

## 2023-08-22 DIAGNOSIS — M54.16 LUMBAR RADICULOPATHY: ICD-10-CM

## 2023-08-22 DIAGNOSIS — M48.061 LUMBAR FORAMINAL STENOSIS: ICD-10-CM

## 2023-08-22 PROCEDURE — 97162 PT EVAL MOD COMPLEX 30 MIN: CPT

## 2023-08-22 NOTE — PROGRESS NOTES
PT Evaluation     Today's date: 2023  Patient name: Lou García  : 1965  MRN: [id number]  Referring provider: Jenny Phelps DO  Dx:   Encounter Diagnosis     ICD-10-CM    1. Lumbar radiculopathy  M54.16 Ambulatory referral to Physical Therapy      2. Lumbar foraminal stenosis  M48.061 Ambulatory referral to Physical Therapy            Assessment  Assessment details: Patient is a 61 y.o. Male who presents to skilled outpatient PT with referring diagnosis of lumbar radiculopathy and lumbar foraminal stenosis. Patient presents with low back pain and abnormal sensation in b/l upper thighs. The aforementioned impairments have limited the patient's ability to ambulate and stand for long periods of time. Patient's feels sxs down to right knee and left lateral thigh. Patient responded well to prone press ups with some relief in sxs. Patient education performed during today's session included: HEP as noted on flow sheet, nature of lumbar radiculopathy, and postural education. Patient verbalized understanding of POC.     Impairments: Activity intolerance, Lacks appropriate HEP and Poor posture  Understanding of Dx/Px/POC: Excellent  Prognosis: Good      Plan  Patient would benefit from: Skilled PT  Planned modality interventions: Biofeedback, Cryotherapy, TENS and Thermotherapy: Hydrocollator Packs  Planned therapy interventions: Abdominal trunk stabilization, Breathing training, Body mechanics training, Functional ROM exercises, HEP, Joint mobilization, Manual therapy, Barrera taping, Neuromuscular re-education, Patient education, Postural training, Strengthening, Stretching, Therapeutic activities and Therapeutic exercises  Frequency: 2x/wk  Duration in weeks: 8  Plan of Care beginning date: 23  Plan of Care expiration date: 8 weeks - 10/17/2023  Treatment plan discussed with: Patient       Goals  Short Term Goals (4 weeks):    - Patient will be independent in basic HEP 2-3 weeks  - Patient will have 0/10 pain at rest  - Patient will demonstrate >1/3 improvement in MMT grade as applicable  - Patient will be able to sit and stand for longer than 20 minutes without increase of sxs    Long Term Goals (8 weeks):  - Patient will be independent in a comprehensive home exercise program  - Patient FOTO score will improve by 10 points. - Patient will self-report >75% improvement in function  - Patient will be able to work for 4 hours with no increase in sxs      Subjective    History of Present Illness  - Mechanism of injury: Patient started new job in January which required more walking. Works at [employer] for [employer]. Has numbness and tingling in anterior thigh. Has long history of back pain for which he has injections. Has recently had two injections in spine. Recent sxs include pain down right glute into leg. Gets easily discouraged, has been doing stretching exercises.  Feels a tinge on anterior right knee when he taps thigh, left thigh has lateral numbness    - Functional limitations: isn't too limiting because he doesn't really want to do much more than he is doing    - Patient goals: get rid of numbness in thighs    Red Flag Screening    Positive for: age >47 years old   Negative for: history of cancer, fever, chills, night sweats, weight loss, recent infection, immunosuppression, rest/night pain, saddle anesthesia, bladder dysfunction and LE neurological deficits  Pain  - Current pain rating: 3/10  - At best pain rating: 3/10  - At worst pain rating: 3/10  - Location: b/l thighs R>L  - Alleviating factors: possibly symbalta    Social Support  - Steps to enter house: 1  - Stairs in house: 13   - Bedroom/bathroom set up: 2nd floor  - Lives in: house  - Lives with: alone      Objective   LE MMT    L Hip Flexion: 5/5  R Hip Flexion: 5/5   L Hip Extension: 5/5 R Hip Extension: 5/5   L Hip Abduction: 5/5 R Hip Abduction: 5/5   L Hip Adduction: 5/5 R Hip Adduction: 5/5   L Knee Extension: 5/5 R Knee Extension: 5/5   L Knee Flexion: 5/5 R Knee Flexion: 5/5   L Ankle DF: 5/5 R Ankle DF: 5/5   L Ankle PF: 5/5  R Ankle PF:  5/5   L Ankle IV: 4/5  L Ankle IV: 4/5   L Ankle EV: 5/5  L Ankle EV: 5/5         Movement Loss % Symptoms   Flexion 75 10x no change,   Extension 100 10x- stretch felt good, 20x prone press up a little better   Side bending R 100    Side bending L 100    Rotation R 100    Rotatoin L 100       Symptom response Mechanical Response    During testing After testing Effect (?? ROM or key functional test) No effect   Pretest symptoms in standing       FIS       RFIS       EIS       MARIZA              Pretest symptoms lying       BETTE       RFIL       EIL       REIL              Pretest symptoms       R SGIS       R RSGIS       L SGIS       L RSGIS              Other movements             Sensation  - Light touch: intact    Palpation   -wfl      Special Testing L R   FABIR     FADIR     Hip Scour  negative negative   Thigh thrust negative negative   Standing flexion test  positive  negative   Prone knee flexion test positive  negative   Supine to long sit test positive  negative   Neurodynamic assessment             Precautions: standard  Past Medical History:   Diagnosis Date   • BPH (benign prostatic hyperplasia)    • Hypertension        Insurance:  A/CMS Eval/ Re-eval POC expires Beth Codding #/ Referral # Total   Visits  Start date  Expiration date Extension  Visit limitation? PT only or  PT+OT?  Co-Insurance   CMS 8/22/23 10/31/23                                                                          AUTH #:  Date               Visits  Authed:  Used                Remaining                   Date 8/22/2023        Visit Number IE                 Manual         P-A mobs Gr V left posterior innominate thrust with pt permission                                   Neuro Re-ed         TrA progression         Hip add squeeze         Clamshells          Bridge          Sciatic nerve sliders                  Thera Ex         MARIZA/REIL Prone press up HEP        TB shoulder ext         TB shoulder row         Paloff press          Anti-rotation press                                    Thera Activity         Patient education         Lifting mechanics         Posture education                                                      Modalities

## 2023-08-28 ENCOUNTER — OFFICE VISIT (OUTPATIENT)
Dept: PHYSICAL THERAPY | Facility: CLINIC | Age: 58
End: 2023-08-28
Payer: COMMERCIAL

## 2023-08-28 DIAGNOSIS — M54.16 LUMBAR RADICULOPATHY: Primary | ICD-10-CM

## 2023-08-28 DIAGNOSIS — M48.061 LUMBAR FORAMINAL STENOSIS: ICD-10-CM

## 2023-08-28 PROCEDURE — 97110 THERAPEUTIC EXERCISES: CPT

## 2023-08-28 NOTE — PROGRESS NOTES
Daily Note     Today's date: 2023  Patient name: Junior Jane  : 1965  MRN: [id number]  Referring provider: Cesia Santiago DO  Dx:   Encounter Diagnosis     ICD-10-CM    1. Lumbar radiculopathy  M54.16       2. Lumbar foraminal stenosis  M48.061           Start Time: 930  Stop Time: 1015  Total time in clinic (min): 45 minutes    Subjective: No change since last in clinic      Objective: See treatment diary below      Assessment: Tolerated treatment well. Patient would benefit from continued PT in order to strengthen postural and core musculature. Patient did not experience changed in pain levels with KATERINE with repeated changes of HOB. Still experiencing numbness in right thigh when taping with hand. Try "breaking break" L2-L3 next session. Plan: Continue per plan of care. Precautions: standard  Medical History        Past Medical History:   Diagnosis Date   • BPH (benign prostatic hyperplasia)     • Hypertension              Insurance:  A/CMS Eval/ Re-eval POC expires Vernelle Demetrice #/ Referral # Total   Visits  Start date  Expiration date Extension  Visit limitation? PT only or  PT+OT?  Co-Insurance   CMS 8/22/23 10/31/23      12                                                                                                                                AUTH #:  Date                           Visits  Authed:  Used                             Remaining                               Date 2023           Visit Number IE                             Manual               P-A mobs Gr V left posterior innominate thrust with pt permission                                                             Neuro Re-ed               TrA progression    performed           Hip add squeeze               Clamshells                Bridge                Sciatic nerve sliders               KATERINE  3 HOB changes       TA+ march 20x  20x       TA + SLR  20x                                Thera Ex               MARIZA/REIL Prone press up HEP             TB shoulder ext    20x BTB           TB shoulder row    20x BTB           Paloff press     20x b/l BTB           Anti-rotation press                ball roll out    forward, left , right 20x ea                                           Thera Activity               Patient education               Lifting mechanics               Posture education                                                                                               Modalities

## 2023-08-30 ENCOUNTER — OFFICE VISIT (OUTPATIENT)
Dept: PHYSICAL THERAPY | Facility: CLINIC | Age: 58
End: 2023-08-30
Payer: COMMERCIAL

## 2023-08-30 DIAGNOSIS — M54.16 LUMBAR RADICULOPATHY: Primary | ICD-10-CM

## 2023-08-30 DIAGNOSIS — M48.061 LUMBAR FORAMINAL STENOSIS: ICD-10-CM

## 2023-08-30 PROCEDURE — 97112 NEUROMUSCULAR REEDUCATION: CPT

## 2023-08-30 NOTE — PROGRESS NOTES
Daily Note     Today's date: 2023  Patient name: Bibi Anderson  : 1965  MRN: [id number]  Referring provider: Roberta Sarmiento DO  Dx:   Encounter Diagnosis     ICD-10-CM    1. Lumbar radiculopathy  M54.16       2. Lumbar foraminal stenosis  M48.061                      Subjective: Reports lumbar pain of 2/10 today and some numbness when tapping R thigh      Objective: See treatment diary below      Assessment: Tolerated treatment well. Patient would benefit from continued PT in order to manage lumbar sxs. Patient had no change in sxs with any directional preference movements. Continue to work on core strengthening and stabilization. Plan: Continue per plan of care. Precautions: standard  Medical History        Past Medical History:   Diagnosis Date   • BPH (benign prostatic hyperplasia)     • Hypertension              Insurance:  A/CMS Eval/ Re-eval POC expires Bryant Math #/ Referral # Total   Visits  Start date  Expiration date Extension  Visit limitation? PT only or  PT+OT?  Co-Insurance   CMS 8/22/23 10/31/23      12                                                                                                                                AUTH #:  Date                           Visits  Authed:  Used                             Remaining                               Date 2023         Visit Number IE  2/12  3/12                         Manual               P-A mobs Gr V left posterior innominate thrust with pt permission                                                             Neuro Re-ed               TrA progression    performed  20x side glide R         Hip add squeeze      20x side glide L         Clamshells       seated reach to right with flexion 2*10         Bridge                Sciatic nerve sliders      HOC left prone push up 2*10         KATERINE  3 HOB changes Seated rear reach and extension to right      TA+ march 20x  20x       TA + SLR  20x 20x b/l         Bridge with ADD squeeze 20x                      Thera Ex               MARIZA/REIL Prone press up HEP             TB shoulder ext    20x BTB  20x 17.5 # CC        TB shoulder row    20x BTB  20x 17.5#        Paloff press     20x b/l BTB  15x 10# b/l         Anti-rotation press                ball roll out    forward, left , right 20x ea                                           Thera Activity               Patient education               Lifting mechanics               Posture education                                                                                               Modalities

## 2023-09-06 ENCOUNTER — OFFICE VISIT (OUTPATIENT)
Dept: PHYSICAL THERAPY | Facility: CLINIC | Age: 58
End: 2023-09-06
Payer: COMMERCIAL

## 2023-09-06 DIAGNOSIS — M54.16 LUMBAR RADICULOPATHY: Primary | ICD-10-CM

## 2023-09-06 DIAGNOSIS — M48.061 LUMBAR FORAMINAL STENOSIS: ICD-10-CM

## 2023-09-06 PROCEDURE — 97140 MANUAL THERAPY 1/> REGIONS: CPT

## 2023-09-06 PROCEDURE — 97112 NEUROMUSCULAR REEDUCATION: CPT

## 2023-09-06 NOTE — PROGRESS NOTES
Daily Note     Today's date: 2023  Patient name: Mat Manzanares  : 1965  MRN: [id number]  Referring provider: Millie Gutierrez DO  Dx:   Encounter Diagnosis     ICD-10-CM    1. Lumbar radiculopathy  M54.16       2. Lumbar foraminal stenosis  M48.061           Start Time: 1330  Stop Time: 1415  Total time in clinic (min): 45 minutes    Subjective: Nothing much has changed      Objective: See treatment diary below      Assessment: Tolerated treatment well. Patient would benefit from continued PT in order to build core stability and postural strength. Experienced fewer sxs in right lateral thigh after "breaking bread" of L2-L23. Continue to progress with strengthening. Plan: Continue per plan of care. Precautions: standard  Medical History        Past Medical History:   Diagnosis Date   • BPH (benign prostatic hyperplasia)     • Hypertension              Insurance:  A/CMS Eval/ Re-eval POC expires Jannet sAhby #/ Referral # Total   Visits  Start date  Expiration date Extension  Visit limitation? PT only or  PT+OT?  Co-Insurance   CMS 8/22/23 10/31/23      12                                                                                                                                AUTH #:  Date                           Visits  Authed:  Used                             Remaining                               Date 2023       Visit Number IE  2/12  3/12  4/12                       Manual               P-A mobs Gr V left posterior innominate thrust with pt permission      "breaking bread" L2-L3 R, 3 bouts 30, 2x                LAD 4-5 min                                       Neuro Re-ed               TrA progression    performed  20x side glide R         Hip add squeeze      20x side glide L         Clamshells       seated reach to right with flexion 2*10 2*10 with blue loop       Bridge                Sciatic nerve sliders      HOC left prone push up 2*10         KATERINE  3 HOB changes Seated rear reach and extension to right Standing hip ABD/EXT 2*10 GTB     TA+ march 20x  20x  Lateral walks 10 ft 5 laps GTB     TA + SLR  20x 20x b/l 20x b/l        Bridge with ADD squeeze 20x Bridge with ADD squeeze 20x                     Thera Ex               MARIZA/REIL Prone press up HEP             TB shoulder ext    20x BTB  20x 17.5 # CC 20x 17.5 # CC       TB shoulder row    20x BTB  20x 17.5# 20x 22.5 # CC       Paloff press     20x b/l BTB  15x 10# b/l  15x 10#        Anti-rotation press                ball roll out    forward, left , right 20x ea                                           Thera Activity               Patient education               Lifting mechanics               Posture education                                                                                               Modalities

## 2023-09-11 ENCOUNTER — OFFICE VISIT (OUTPATIENT)
Dept: PHYSICAL THERAPY | Facility: CLINIC | Age: 58
End: 2023-09-11
Payer: COMMERCIAL

## 2023-09-11 ENCOUNTER — OFFICE VISIT (OUTPATIENT)
Age: 58
End: 2023-09-11
Payer: COMMERCIAL

## 2023-09-11 VITALS — HEIGHT: 71 IN | BODY MASS INDEX: 35 KG/M2 | TEMPERATURE: 96.7 F | WEIGHT: 250 LBS

## 2023-09-11 DIAGNOSIS — M48.061 LUMBAR FORAMINAL STENOSIS: ICD-10-CM

## 2023-09-11 DIAGNOSIS — M54.16 LUMBAR RADICULOPATHY: Primary | ICD-10-CM

## 2023-09-11 DIAGNOSIS — B07.8 OTHER VIRAL WARTS: Primary | ICD-10-CM

## 2023-09-11 PROCEDURE — 97140 MANUAL THERAPY 1/> REGIONS: CPT

## 2023-09-11 PROCEDURE — 97112 NEUROMUSCULAR REEDUCATION: CPT

## 2023-09-11 PROCEDURE — 17110 DESTRUCTION B9 LES UP TO 14: CPT | Performed by: DERMATOLOGY

## 2023-09-11 NOTE — PROGRESS NOTES
Daily Note     Today's date: 2023  Patient name: Shola Khanna  : 1965  MRN: [id number]  Referring provider: Chacorta Loja DO  Dx:   Encounter Diagnosis     ICD-10-CM    1. Lumbar radiculopathy  M54.16       2. Lumbar foraminal stenosis  M48.061           Start Time: 1500  Stop Time: 1545  Total time in clinic (min): 45 minutes    Subjective: Reports feeling slightly better and that he will be scheduling another shot in the future      Objective: See treatment diary below      Assessment: Tolerated treatment well. Patient would benefit from continued PT  In order to build core and postural strength to support lumbar spine. Introduced yoga based exercises for lumbar mobility. Coninue to progress strengthening. Plan: Continue per plan of care. Precautions: standard  Medical History        Past Medical History:   Diagnosis Date   • BPH (benign prostatic hyperplasia)     • Hypertension              Insurance:  A/CMS Eval/ Re-eval POC expires Yudi Sharma #/ Referral # Total   Visits  Start date  Expiration date Extension  Visit limitation? PT only or  PT+OT?  Co-Insurance   CMS 8/22/23 10/31/23      12                                                                                                                                AUTH #:  Date                           Visits  Authed:  Used                             Remaining                               Date 2023     Visit Number IE  2/12  3/12  4/12                       Manual               P-A mobs Gr V left posterior innominate thrust with pt permission      "breaking bread" L2-L3 R, 3 bouts 30, 2x                LAD 4-5 min                                       Neuro Re-ed               TrA progression    performed  20x side glide R         Hip add squeeze      20x side glide L         Clamshells       seated reach to right with flexion 2*10 2*10 with blue loop  2*10 blue loop     Bridge                Sciatic nerve sliders     [address] left prone push up 2*10         KATERINE  3 HOB changes Seated rear reach and extension to right Standing hip ABD/EXT 2*10 GTB Standing hip ABD/EXT 2*10 GTB    TA+ march 20x  20x  Lateral walks 10 ft 5 laps GTB Lateral walks 10 ft 5 laps GTB, monster walks 15' 6x    TA + SLR  20x 20x b/l 20x b/l LTR 30x       Bridge with ADD squeeze 20x Bridge with ADD squeeze 20x Bridge w add squeeze 20x                    Thera Ex               MARIZA/REIL Prone press up HEP             TB shoulder ext    20x BTB  20x 17.5 # CC 20x 17.5 # CC  20x 17.5# CC     TB shoulder row    20x BTB  20x 17.5# 20x 22.5 # CC  20x 22.5# CC     Paloff press     20x b/l BTB  15x 10# b/l  15x 10#   15x 10#     Anti-rotation press          hip burners 20x b/l      ball roll out    forward, left , right 20x ea                                           Thera Activity               Patient education               Lifting mechanics               Posture education                                                                                               Modalities

## 2023-09-11 NOTE — PROGRESS NOTES
[facility] Note     Patient Name: Portia Benítez  Encounter Date: 9/11/23    Have you been cared for by a [facility]ologist in the last 3 years and, if so, which description applies to you? Yes. I have been here within the last 3 years, and my medical history has NOT changed since that time. I am MALE/not capable of bearing children. REVIEW OF SYSTEMS:  Have you recently had or currently have any of the following? No changes in my recent health. PAST MEDICAL HISTORY:  Have you personally ever had or currently have any of the following? If "YES," then please provide more detail. No changes in my medical history. FAMILY HISTORY:  Any "first degree relatives" (parent, brother, sister, or child) with the following? No changes in my family's known health. PATIENT EXPERIENCE:    Do you want the Dermatologist to perform a COMPLETE skin exam today including a clinical examination under the "bra and underwear" areas? NO  If necessary, do we have your permission to call and leave a detailed message on your Preferred Phone number that includes your specific medical information? Yes      No Known Allergies   Current Outpatient Medications:   •  atorvastatin (LIPITOR) 10 mg tablet, TAKE 1 TABLET DAILY AT BEDTIME, Disp: 30 tablet, Rfl: 6  •  cholecalciferol (VITAMIN D3) 1,000 units tablet, Take 1,000 Units by mouth daily, Disp: , Rfl:   •  doxazosin (CARDURA) 4 mg tablet, Take 4 mg by mouth daily at bedtime, Disp: , Rfl:   •  DULoxetine (CYMBALTA) 60 mg delayed release capsule, Take 1 capsule (60 mg total) by mouth daily, Disp: 30 capsule, Rfl: 1  •  tamsulosin (FLOMAX) 0.4 mg, Take 0.4 mg by mouth daily with dinner, Disp: , Rfl:   •  finasteride (PROSCAR) 5 mg tablet, , Disp: , Rfl:           Whom besides the patient is providing clinical information about today's encounter?    NO ADDITIONAL HISTORIAN (patient alone provided history)    Physical Exam and Assessment/Plan by Diagnosis:    VERRUCA VULGARIS ("Common Warts")    Physical Exam:  Anatomic Location Affected:  Right index finger  Morphological Description:  verruca vulgaris  Pertinent Positives:  Pertinent Negatives: Additional History of Present Condition:   Patient notes that noticed growth on finger a couple weeks ago      Assessment and Plan:  Based on a thorough discussion of this condition and the management approach to it (including a comprehensive discussion of the known risks, side effects and potential benefits of treatment), the patient (family) agrees to implement the following specific plan:    Liquid nitrogen was applied for 10-12 seconds to the skin lesion and the expected blistering or scabbing reaction explained. Do not pick at the area. Patient reminded to expect hypopigmented scars from the procedure. Return if lesion fails to fully resolve. Cantharone  Applied to finger    2 month follow up     Verruca Vulgaris  A verruca is a common growth of the skin caused by infection by human papilloma virus (HPV). There are many strains of the virus that cause different types of warts on the body. The virus infects the most superficial layers of the skin, causing increased production of skin cells and thickening. Warts can be spread through direct contact with infected skin and may spread to other parts of the body if scratched or picked. A verruca is more commonly called a "wart." Warts are particularly common in school-aged children but can arise at any age. Patients who have a history of eczema are especially prone due to impaired skin barrier. Those taking immunosuppressive drugs or with HIV infections may experience prolonged symptoms despite treatment. Warts generally have a rough surface with a tiny black dot sometimes observed in the middle of each scaly spot. They can range in size from a small bump to large scaly growths.   Common warts are often found on the backs of fingers or toes, around the nails, and on the knees. Plantar warts can grow inwardly on the soles of the feet causing pain. There are many possible ways to treat warts and sometimes several different treatments are needed to get the warts to go away completely. There is no single perfect treatment for warts, and successful treatment can take many months. In-office treatments usually require multiple visits, and include:  Cryotherapy. a cold spray with liquid nitrogen will destroy the infected cells but may lead to discomfort and blistering. It may also leave a permanent white rebel or scar. Electrosurgery (curettage and cautery) can be used for large resistant warts which involves shaving the growth down and burning the base. It is performed under local anesthesia and may leave a permanent scar    Candida (“yeast”) antigen injections. These are extracts of the common yeast (Candida) that cannot cause an infection. The medication is injected into/under the wart. It is thought to stimulate the immune system to recognize the wart virus and attack it. Multiple injections are often needed about one month apart. There are also several at-home wart treatments:    Soak the warts in warm water for 5 minutes every night followed by gentle filing with a nail file or pumice stone. Topical salicylic acid or similar compounds work by removing the dead surface skin cells  Apply the medicine directly to the wart, wait for it to dry completely, then cover with duct tape overnight   Repeat until the wart is gone, which can take 2-4 months  Do not use on the face or groin area   If the wart paint makes the skin sore, stop treatment until the discomfort has settled, then recommence as above. Take care to keep the chemical off normal skin. Podophyllin is a cytotoxic agent used in some products and must not be used in pregnancy or women considering pregnancy.     Some prescription medications include   Topical retinoids (adapalene, tretinoin, tazarotene), 5-fluorouracil (Efudex) or imiquimod (Aldara) creams are sometimes used to treat flat warts or warts on the face and other sensitive anatomical areas. They are usually applied directly to the warts once a day for 2-4 months and can be irritating. These treatments should only be used as directed by your health care provider. Systemic treatment with oral cimetidine (Tagamet) may help boost the immune system against the wart virus in patients, some of the time. Initiation of cimetidine therapy should ONLY be done under the supervision of your health care provider, who can discuss possible side effects and drug-to-drug interactions of this specific treatment. PROCEDURE:  DESTRUCTION OF BENIGN LESIONS WITH CRYOTHERAPY  After a thorough discussion of treatment options and risk/benefits/alternatives (including but not limited to local pain, scarring, dyspigmentation, blistering, and possible superinfection), verbal and written consent were obtained and the aforementioned lesions were treated on with cryotherapy using liquid nitrogen x 1 cycle for 5-10 seconds. TOTAL NUMBER of 1 lesions were treated today on the ANATOMIC LOCATION: finger. The patient tolerated the procedure well, and after-care instructions were provided.         Scribe Attestation    I,:  Claudette Hernandez am acting as a scribe while in the presence of the attending physician.:       I,:  Shea Merrill MD personally performed the services described in this documentation    as scribed in my presence.:

## 2023-09-11 NOTE — PATIENT INSTRUCTIONS
VERRUCA VULGARIS ("Common Warts")    Assessment and Plan:  Based on a thorough discussion of this condition and the management approach to it (including a comprehensive discussion of the known risks, side effects and potential benefits of treatment), the patient (family) agrees to implement the following specific plan:    Liquid nitrogen was applied for 10-12 seconds to the skin lesion and the expected blistering or scabbing reaction explained. Do not pick at the area. Patient reminded to expect hypopigmented scars from the procedure. Return if lesion fails to fully resolve. Cantharone applied to finger    2 month follow up     Verruca Vulgaris  A verruca is a common growth of the skin caused by infection by human papilloma virus (HPV). There are many strains of the virus that cause different types of warts on the body. The virus infects the most superficial layers of the skin, causing increased production of skin cells and thickening. Warts can be spread through direct contact with infected skin and may spread to other parts of the body if scratched or picked. A verruca is more commonly called a "wart." Warts are particularly common in school-aged children but can arise at any age. Patients who have a history of eczema are especially prone due to impaired skin barrier. Those taking immunosuppressive drugs or with HIV infections may experience prolonged symptoms despite treatment. Warts generally have a rough surface with a tiny black dot sometimes observed in the middle of each scaly spot. They can range in size from a small bump to large scaly growths. Common warts are often found on the backs of fingers or toes, around the nails, and on the knees. Plantar warts can grow inwardly on the soles of the feet causing pain. There are many possible ways to treat warts and sometimes several different treatments are needed to get the warts to go away completely.  There is no single perfect treatment for warts, and successful treatment can take many months. In-office treatments usually require multiple visits, and include:  Cryotherapy. a cold spray with liquid nitrogen will destroy the infected cells but may lead to discomfort and blistering. It may also leave a permanent white rebel or scar. Electrosurgery (curettage and cautery) can be used for large resistant warts which involves shaving the growth down and burning the base. It is performed under local anesthesia and may leave a permanent scar    Candida (“yeast”) antigen injections. These are extracts of the common yeast (Candida) that cannot cause an infection. The medication is injected into/under the wart. It is thought to stimulate the immune system to recognize the wart virus and attack it. Multiple injections are often needed about one month apart. There are also several at-home wart treatments:    Soak the warts in warm water for 5 minutes every night followed by gentle filing with a nail file or pumice stone. Topical salicylic acid or similar compounds work by removing the dead surface skin cells  Apply the medicine directly to the wart, wait for it to dry completely, then cover with duct tape overnight   Repeat until the wart is gone, which can take 2-4 months  Do not use on the face or groin area   If the wart paint makes the skin sore, stop treatment until the discomfort has settled, then recommence as above. Take care to keep the chemical off normal skin. Podophyllin is a cytotoxic agent used in some products and must not be used in pregnancy or women considering pregnancy. Some prescription medications include   Topical retinoids (adapalene, tretinoin, tazarotene), 5-fluorouracil (Efudex) or imiquimod (Aldara) creams are sometimes used to treat flat warts or warts on the face and other sensitive anatomical areas. They are usually applied directly to the warts once a day for 2-4 months and can be irritating.   These treatments should only be used as directed by your health care provider. Systemic treatment with oral cimetidine (Tagamet) may help boost the immune system against the wart virus in patients, some of the time. Initiation of cimetidine therapy should ONLY be done under the supervision of your health care provider, who can discuss possible side effects and drug-to-drug interactions of this specific treatment.

## 2023-09-13 ENCOUNTER — OFFICE VISIT (OUTPATIENT)
Dept: PHYSICAL THERAPY | Facility: CLINIC | Age: 58
End: 2023-09-13
Payer: COMMERCIAL

## 2023-09-13 DIAGNOSIS — M54.16 LUMBAR RADICULOPATHY: Primary | ICD-10-CM

## 2023-09-13 DIAGNOSIS — M48.061 LUMBAR FORAMINAL STENOSIS: ICD-10-CM

## 2023-09-13 DIAGNOSIS — M54.16 LUMBAR RADICULOPATHY: ICD-10-CM

## 2023-09-13 PROCEDURE — 97110 THERAPEUTIC EXERCISES: CPT

## 2023-09-13 NOTE — TELEPHONE ENCOUNTER
Attempted to contact pt. Per message, call cannot be completed at this time, please try again later.      NOTE: LP 8/15/23 #30 with 1 refill

## 2023-09-13 NOTE — PROGRESS NOTES
Daily Note     Today's date: 2023  Patient name: Diane Dumont  : 1965  MRN: [id number]  Referring provider: Camelia Pedro DO  Dx:   Encounter Diagnosis     ICD-10-CM    1. Lumbar radiculopathy  M54.16       2. Lumbar foraminal stenosis  M48.061           Start Time: 1030  Stop Time: 1115  Total time in clinic (min): 45 minutes    Subjective: My back and numbness in my thigh are a little bit better      Objective: See treatment diary below      Assessment: Tolerated treatment well. Patient would benefit from continued PT in order to build core and postural musculature to support lumbar spine. Did well with the addition of squats and leg press. Continue to build strengthening program.       Plan: Continue per plan of care. Precautions: standard  Medical History        Past Medical History:   Diagnosis Date   • BPH (benign prostatic hyperplasia)     • Hypertension              Insurance:  A/CMS Eval/ Re-eval POC expires Tracey Albarran #/ Referral # Total   Visits  Start date  Expiration date Extension  Visit limitation? PT only or  PT+OT?  Co-Insurance   CMS 8/22/23 10/31/23      12                                                                                                                                AUTH #:  Date                           Visits  Authed:  Used                             Remaining                               Date 2023   Visit Number IE  2/12  3/12  4/12  5/12  6/12                   Manual               P-A mobs Gr V left posterior innominate thrust with pt permission      "breaking bread" L2-L3 R, 3 bouts 30, 2x                LAD 4-5 min                                       Neuro Re-ed               TrA progression    performed  20x side glide R         Hip add squeeze      20x side glide L         Clamshells       seated reach to right with flexion 2*10 2*10 with blue loop  2*10 blue loop  2*10 blue loop   Bridge            2*10 with add squeeze   Sciatic nerve sliders      HOC left prone push up 2*10      leg press 95# 3*10   KATERINE  3 HOB changes Seated rear reach and extension to right Standing hip ABD/EXT 2*10 GTB Standing hip ABD/EXT 2*10 GTB Standing hip ABD/EXT 2*10 GTB   TA+ march 20x  20x  Lateral walks 10 ft 5 laps GTB Lateral walks 10 ft 5 laps GTB, monster walks 15' 6x    TA + SLR  20x 20x b/l 20x b/l LTR 30x       Bridge with ADD squeeze 20x Bridge with ADD squeeze 20x Bridge w add squeeze 20x Bridge w ADD squeeze 20x                   Thera Ex               MARIZA/REIL Prone press up HEP             TB shoulder ext    20x BTB  20x 17.5 # CC 20x 17.5 # CC  20x 17.5# CC  20x 17.5# CC   TB shoulder row    20x BTB  20x 17.5# 20x 22.5 # CC  20x 22.5# CC  20x 22.5# CC   Paloff press     20x b/l BTB  15x 10# b/l  15x 10#   15x 10#  15x 10#   Anti-rotation press          hip burners 20x b/l      ball roll out    forward, left , right 20x ea                                           Thera Activity               Patient education               Lifting mechanics               Posture education                                                                                               Modalities

## 2023-09-14 RX ORDER — DULOXETIN HYDROCHLORIDE 60 MG/1
60 CAPSULE, DELAYED RELEASE ORAL DAILY
Qty: 30 CAPSULE | Refills: 1 | OUTPATIENT
Start: 2023-09-14

## 2023-09-18 ENCOUNTER — OFFICE VISIT (OUTPATIENT)
Dept: PHYSICAL THERAPY | Facility: CLINIC | Age: 58
End: 2023-09-18
Payer: COMMERCIAL

## 2023-09-18 DIAGNOSIS — M54.16 LUMBAR RADICULOPATHY: Primary | ICD-10-CM

## 2023-09-18 DIAGNOSIS — M48.061 LUMBAR FORAMINAL STENOSIS: ICD-10-CM

## 2023-09-18 PROCEDURE — 97110 THERAPEUTIC EXERCISES: CPT

## 2023-09-18 PROCEDURE — 97112 NEUROMUSCULAR REEDUCATION: CPT

## 2023-09-18 NOTE — PROGRESS NOTES
Daily Note     Today's date: 2023  Patient name: Azul Townsend  : 1965  MRN: [id number]  Referring provider: Ed Mortensen DO  Dx:   Encounter Diagnosis     ICD-10-CM    1. Lumbar radiculopathy  M54.16       2. Lumbar foraminal stenosis  M48.061                      Subjective: Pt reports no new complaints. Has similar level of back soreness, notes that numbness in B anterior thigh and hips is improving. Encouraged by progress. Has visit for injection next week. Objective: requires cueing w/ squat/pallof press to promote proper posture throughout duration of press - corrects and is able to maintain despite fatigue. Assessment: Tolerated treatment well. Patient demonstrated fatigue post treatment and would benefit from continued PT. Does well w/ exercise progressions today, notes normal level of soreness to finish. Has another visit for later this week, will benefit from further dynamic strength progressions as sx allow. Pt in agreement w/ plan. Plan: Continue per plan of care. carries, hip hinge vs squat     Precautions: standard  Medical History        Past Medical History:   Diagnosis Date   • BPH (benign prostatic hyperplasia)     • Hypertension              Insurance:  AMA/CMS Eval/ Re-eval POC expires Billye Mile #/ Referral # Total   Visits  Start date  Expiration date Extension  Visit limitation? PT only or  PT+OT?  Co-Insurance   CMS 8/22/23 10/31/23      12                                                                                                                                AUTH #:  Date                           Visits  Authed:  Used                             Remaining                               Date 2023   Visit Number IE  2/12  3/12  4/12  5/12  6/12 7/12                    Manual                P-A mobs Gr V left posterior innominate thrust with pt permission      "breaking bread" L2-L3 R, 3 bouts 30, 2x                 LAD 4-5 min                                          Neuro Re-ed                TrA progression    performed  20x side glide R       Isos, march 2x10, single leg ext 2x10    Hip add squeeze      20x side glide L          Clamshells       seated reach to right with flexion 2*10 2*10 with blue loop  2*10 blue loop  2*10 blue loop rev   Bridge             2*10 with add squeeze 3x10 w/ ad sq   Sciatic nerve sliders      HOC left prone push up 2*10      leg press 95# 3*10 95# 3x10 total    KATERINE  3 HOB changes Seated rear reach and extension to right Standing hip ABD/EXT 2*10 GTB Standing hip ABD/EXT 2*10 GTB Standing hip ABD/EXT 2*10 GTB rev   TA+ march 20x  20x  Lateral walks 10 ft 5 laps GTB Lateral walks 10 ft 5 laps GTB, monster walks 15' 6x     TA + SLR  20x 20x b/l 20x b/l LTR 30x        Bridge with ADD squeeze 20x Bridge with ADD squeeze 20x Bridge w add squeeze 20x Bridge w ADD squeeze 20x above                 Squat w/ pallof 18# KB 3x10 total    Thera Ex                MARIZA/REIL Prone press up HEP              TB shoulder ext    20x BTB  20x 17.5 # CC 20x 17.5 # CC  20x 17.5# CC  20x 17.5# CC rev   TB shoulder row    20x BTB  20x 17.5# 20x 22.5 # CC  20x 22.5# CC  20x 22.5# CC rev   Paloff press     20x b/l BTB  15x 10# b/l  15x 10#   15x 10#  15x 10# 17.5# 3x10 B   Anti-rotation press          hip burners 20x b/l       ball roll out    forward, left , right 20x ea         3 way x10-15 each                 B HS and glute/piri w/ intermittent LAD x 6-7 mins                     Thera Activity                Patient education                Lifting mechanics                Posture education                                                                                                     Modalities

## 2023-09-20 ENCOUNTER — OFFICE VISIT (OUTPATIENT)
Dept: PODIATRY | Facility: CLINIC | Age: 58
End: 2023-09-20
Payer: COMMERCIAL

## 2023-09-20 ENCOUNTER — EVALUATION (OUTPATIENT)
Dept: PHYSICAL THERAPY | Facility: CLINIC | Age: 58
End: 2023-09-20
Payer: COMMERCIAL

## 2023-09-20 VITALS
RESPIRATION RATE: 16 BRPM | SYSTOLIC BLOOD PRESSURE: 129 MMHG | DIASTOLIC BLOOD PRESSURE: 68 MMHG | WEIGHT: 250 LBS | BODY MASS INDEX: 35 KG/M2 | HEIGHT: 71 IN

## 2023-09-20 DIAGNOSIS — M48.061 LUMBAR FORAMINAL STENOSIS: ICD-10-CM

## 2023-09-20 DIAGNOSIS — B35.1 ONYCHOMYCOSIS: ICD-10-CM

## 2023-09-20 DIAGNOSIS — M79.671 PAIN IN BOTH FEET: Primary | ICD-10-CM

## 2023-09-20 DIAGNOSIS — L60.0 INGROWN TOENAIL: ICD-10-CM

## 2023-09-20 DIAGNOSIS — M20.42 HAMMER TOES OF BOTH FEET: ICD-10-CM

## 2023-09-20 DIAGNOSIS — M20.41 HAMMER TOES OF BOTH FEET: ICD-10-CM

## 2023-09-20 DIAGNOSIS — M21.962 ACQUIRED DEFORMITY OF LEFT FOOT: ICD-10-CM

## 2023-09-20 DIAGNOSIS — M54.16 LUMBAR RADICULOPATHY: Primary | ICD-10-CM

## 2023-09-20 DIAGNOSIS — M79.672 PAIN IN BOTH FEET: Primary | ICD-10-CM

## 2023-09-20 DIAGNOSIS — M21.961 ACQUIRED DEFORMITY OF RIGHT FOOT: ICD-10-CM

## 2023-09-20 PROCEDURE — 99213 OFFICE O/P EST LOW 20 MIN: CPT | Performed by: PODIATRIST

## 2023-09-20 PROCEDURE — 97530 THERAPEUTIC ACTIVITIES: CPT | Performed by: PHYSICAL THERAPIST

## 2023-09-20 PROCEDURE — 97110 THERAPEUTIC EXERCISES: CPT | Performed by: PHYSICAL THERAPIST

## 2023-09-20 NOTE — PROGRESS NOTES
Daily Note     Today's date: 2023  Patient name: Monae Conti  : 1965  MRN: [id number]  Referring provider: Adams Malagon DO  Dx:   Encounter Diagnosis     ICD-10-CM    1. Lumbar radiculopathy  M54.16       2. Lumbar foraminal stenosis  M48.061                      Subjective: Pt reportedly has a consistent low back pain but his lateral thigh 2/10 symptom does seem to be persistent as well. Objective: See treatment diary below. Baseline 2/10 symptoms in R lateral thigh and low back ache. Lumbar AROM WFL with slight dec with sergio SGIS. MARIZA over fulcrum x10 Dec/B concordant sign of tapping R lateral thigh intensifying symptoms, no lasting beyond casual walk through clinic, returned to baseline upon retest. MARIZA x 15 over fulcrum abolish concordant sign in lateral thigh on R. Inc time spent discussing posture, no return of symptoms following seated HEP edu with lumbar roll 8'. Assessment: Tolerated treatment well. Patient demo very good response to repeated motions today and demo very clear understanding of nature of HEP, posture and frequency of HEP. Pt demo good prognosis based on today's presentation. Plan: Continue per plan of care. Cont as per primary PT. Precautions: standard  Medical History        Past Medical History:   Diagnosis Date   • BPH (benign prostatic hyperplasia)     • Hypertension              Insurance:  AMA/CMS Eval/ Re-eval POC expires Nikia Handler #/ Referral # Total   Visits  Start date  Expiration date Extension  Visit limitation? PT only or  PT+OT?  Co-Insurance   CMS 8/22/23 10/31/23      12                                                                                                                                AUTH #:  Date                           Visits  Authed:  Used                             Remaining                               Date 2023   Visit Number IE  2/12  3/12  4/12 5/12 6/12 7/12 8 of 12                     Manual                 P-A mobs Gr V left posterior innominate thrust with pt permission      "breaking bread" L2-L3 R, 3 bouts 30, 2x                  LAD 4-5 min                                             Neuro Re-ed                 TrA progression    performed  20x side glide R       Isos, march 2x10, single leg ext 2x10     Hip add squeeze      20x side glide L           Clamshells       seated reach to right with flexion 2*10 2*10 with blue loop  2*10 blue loop  2*10 blue loop rev    Bridge             2*10 with add squeeze 3x10 w/ ad sq    Sciatic nerve sliders      HOC left prone push up 2*10      leg press 95# 3*10 95# 3x10 total     KATERINE  3 HOB changes Seated rear reach and extension to right Standing hip ABD/EXT 2*10 GTB Standing hip ABD/EXT 2*10 GTB Standing hip ABD/EXT 2*10 GTB rev    TA+ march 20x  20x  Lateral walks 10 ft 5 laps GTB Lateral walks 10 ft 5 laps GTB, monster walks 15' 6x      TA + SLR  20x 20x b/l 20x b/l LTR 30x         Bridge with ADD squeeze 20x Bridge with ADD squeeze 20x Bridge w add squeeze 20x Bridge w ADD squeeze 20x above                  Squat w/ pallof 18# KB 3x10 total     Thera Ex                 MARIZA/REIL Prone press up HEP            MARIZA over fulcrum x15   TB shoulder ext    20x BTB  20x 17.5 # CC 20x 17.5 # CC  20x 17.5# CC  20x 17.5# CC rev    TB shoulder row    20x BTB  20x 17.5# 20x 22.5 # CC  20x 22.5# CC  20x 22.5# CC rev    Paloff press     20x b/l BTB  15x 10# b/l  15x 10#   15x 10#  15x 10# 17.5# 3x10 B    Anti-rotation press          hip burners 20x b/l        ball roll out    forward, left , right 20x ea         3 way x10-15 each                  B HS and glute/piri w/ intermittent LAD x 6-7 mins                       Thera Activity                 Patient education              20'   Lifting mechanics                 Posture education              Lumbar roll edu and issued modified roll                                                                                             Modalities

## 2023-09-20 NOTE — PROGRESS NOTES
Assessment/Plan:  Pain upon ambulation.  Hammertoe formation.  Pes planus.  Need for orthotics.  Ingrown toenail.  Paronychia.  Acquired deformity foot bilateral.  Acquired pes planus bilateral.  History of contusion right big toe     Plan.  Chart reviewed.  Foot exam performed.  Patient educated on condition. Pedro Pablo Reddy will use orthotics daily.  Carmelo Albert will stretch.  All nails debrided without pain or complication.   Patient advised on aftercare.            Diagnoses and all orders for this visit:     Pain in both feet     Onychomycosis     Hammer toes of both feet     Ingrown toenail     Hammer toe of right foot     Acquired deformity foot bilateral        Acquired pes planus.            Subjective:  Patient has pain upon ambulation.  He has pain in his toes with ambulation.  He has painful hammertoes.  He uses orthotics daily.  No history of trauma.  Patient believes his orthotics are no longer working. Carmelo Albert is requesting new orthotics     No Known Allergies        Current Outpatient Medications:   •  cholecalciferol (VITAMIN D3) 1,000 units tablet, Take 1,000 Units by mouth daily, Disp: , Rfl:   •  Cholecalciferol (VITAMIN D3) 49750 units CAPS, Take by mouth once a week, Disp: , Rfl:   •  diclofenac sodium (VOLTAREN) 1 %, Apply 4 g topically 4 (four) times a day (Patient taking differently: Apply 4 g topically 4 (four) times a day ), Disp: 4 Tube, Rfl: 1  •  doxazosin (CARDURA) 4 mg tablet, Take 4 mg by mouth daily at bedtime, Disp: , Rfl:   •  meloxicam (MOBIC) 7.5 mg tablet, Take 1 tablet (7.5 mg total) by mouth daily for 10 days (Patient not taking: Reported on 11/20/2019), Disp: 10 tablet, Rfl: 0  •  tamsulosin (FLOMAX) 0.4 mg, Take 0.4 mg by mouth daily with dinner, Disp: , Rfl:            Patient Active Problem List   Diagnosis   • Plantar fasciitis   • Pain in both feet   • Congenital pes planus of right foot   • Congenital pes planus of left foot   • Tinea pedis of both feet   • Dark stools             Patient ID: Jose Olson is a 57 y. o. male.     HPI     The following portions of the patient's history were reviewed and updated as appropriate:      family history includes Cancer in his father; Colon cancer in his maternal aunt; Heart disease in his mother; Hypertension in his father; Stomach cancer in his father.       reports that he has never smoked. He has never used smokeless tobacco. He reports that he drinks alcohol.  He reports that he does not use drugs.      Objective:  Patient's shoes and socks removed.   Foot ExamPhysical Exam          Pulses palpable bilateral PT DP and PT pulses 2/4 bilateral  Significant pes planus bilateral, there is decreased  of the arch on weight-bearing  Right 2nd digit there is medial deviation of the PIPJ mild pain on palpation of 2nd MPJ mild swelling  Severe hallux limitus bilateral left worse than right less than 15° of dorsiflexion 1st ray  No hypermobility of the 1st ray  Muscle strength 5/5 all groups bilateral feet and ankles  Moderate equinus bilateral 5° dorsiflexion knee flexed and extended  Negative edema negative erythema no signs of infection  Nails are thickened discolored dystrophic mild onychomycosis  Ingrown nail both borders bilateral hallux     Ingrown right hallux medial border.  Mild hyperkeratotic lesion on the medial border but no sign of infection  Mild pain on palpation plantar medial heel bilateral  Some rigid hammertoes 2nd digit bilateral     Right hallux demonstrates dried subungual hematoma.  No evidence of lysis or cellulitis

## 2023-09-25 ENCOUNTER — OFFICE VISIT (OUTPATIENT)
Dept: PHYSICAL THERAPY | Facility: CLINIC | Age: 58
End: 2023-09-25
Payer: COMMERCIAL

## 2023-09-25 DIAGNOSIS — M54.16 LUMBAR RADICULOPATHY: Primary | ICD-10-CM

## 2023-09-25 DIAGNOSIS — M48.061 LUMBAR FORAMINAL STENOSIS: ICD-10-CM

## 2023-09-25 PROCEDURE — 97112 NEUROMUSCULAR REEDUCATION: CPT

## 2023-09-25 PROCEDURE — 97110 THERAPEUTIC EXERCISES: CPT

## 2023-09-25 NOTE — PROGRESS NOTES
PT Re-Evaluation/Daily Note     Today's date: 2023  Patient name: Estella Llanos  : 1965  MRN: [id number]  Referring provider: Yaya Hare DO  Dx:   Encounter Diagnosis     ICD-10-CM    1. Lumbar radiculopathy  M54.16       2. Lumbar foraminal stenosis  M48.061           Start Time: 1245  Stop Time: 1330  Total time in clinic (min): 45 minutes      Assessment  Assessment details: Patient is a 61 y.o. Male who presents to skilled outpatient PT with referring diagnosis of lumbar radiculopathy and lumbar foraminal stenosis. Patient presents with low back pain and abnormal sensation in b/l upper thighs which has decreased somewhat in physical therapy. The aforementioned impairments have limited the patient's ability to ambulate and stand for long periods of time. Patient responded well to MARIZA and standing sustained abdominal drop to wall with decreased numbness sensation. Lower back pain level has remained static to somewhat better. Will be receiving steroid injection for lumbar spine on Friday. Patient education performed during today's session included: HEP as noted on flow sheet, nature of lumbar radiculopathy, and postural education.  Patient verbalized understanding of POC.     Impairments: Activity intolerance, Lacks appropriate HEP and Poor posture  Understanding of Dx/Px/POC: Excellent  Prognosis: Good        Plan  Patient would benefit from: Skilled PT  Planned modality interventions: Biofeedback, Cryotherapy, TENS and Thermotherapy: Hydrocollator Packs  Planned therapy interventions: Abdominal trunk stabilization, Breathing training, Body mechanics training, Functional ROM exercises, HEP, Joint mobilization, Manual therapy, Barrera taping, Neuromuscular re-education, Patient education, Postural training, Strengthening, Stretching, Therapeutic activities and Therapeutic exercises  Frequency: 2x/wk  Duration in weeks: 8  Plan of Care beginning date: 23  Plan of Care expiration date: 8 weeks - 2023  Treatment plan discussed with: Patient        Goals  Short Term Goals (4 weeks):  Progressing as of 23  - Patient will be independent in basic HEP 2-3 weeks  - Patient will have 0/10 pain at rest  - Patient will demonstrate >1/3 improvement in MMT grade as applicable  - Patient will be able to sit and stand for longer than 20 minutes without increase of sxs     Long Term Goals (8 weeks):  - Patient will be independent in a comprehensive home exercise program  - Patient FOTO score will improve by 10 points. - Patient will self-report >75% improvement in function  - Patient will be able to work for 4 hours with no increase in sxs        Subjective     History of Present Illness  - Mechanism of injury: Patient started new job in January which required more walking. Works at [employer] for [employer]. Has numbness and tingling in anterior thigh. Has long history of back pain for which he has injections. Has recently had two injections in spine. Recent sxs include pain down right glute into leg. Gets easily discouraged, has been doing stretching exercises.  Feels a tinge on anterior right knee when he taps thigh, left thigh has lateral numbness     - Functional limitations: isn't too limiting because he doesn't really want to do much more than he is doing     - Patient goals: get rid of numbness in thighs progressing as of 23     Red Flag Screening               Positive for: age >47 years old              Negative for: history of cancer, fever, chills, night sweats, weight loss, recent infection, immunosuppression, rest/night pain, saddle anesthesia, bladder dysfunction and LE neurological deficits  Pain  - Current pain ratin- 3/10 in lumbar spine  - At best pain rating: 3/10  - At worst pain rating: 3/10  - Location: b/l thighs R>L , tingling in right thigh has diminished somewhat as of 23  - Alleviating factors: possibly symbalta     Social Support  - Steps to enter house: 1  - Stairs in house: 13   - Bedroom/bathroom set up: 2nd floor  - Lives in: house  - Lives with: alone        Objective   LE MMT     L Hip Flexion: 5/5             R Hip Flexion: 5/5   L Hip Extension: 5/5 R Hip Extension: 5/5   L Hip Abduction: 5/5 R Hip Abduction: 5/5   L Hip Adduction: 5/5 R Hip Adduction: 5/5   L Knee Extension: 5/5 R Knee Extension: 5/5   L Knee Flexion: 5/5 R Knee Flexion: 5/5   L Ankle DF: 5/5 R Ankle DF: 5/5   L Ankle PF: 5/5             R Ankle PF:  5/5   L Ankle IV: 4/5             L Ankle IV: 4/5   L Ankle EV: 5/5             L Ankle EV: 5/5            Movement Loss % Symptoms   Flexion 75 10x no change,   Extension 100 10x- stretch felt good, 20x prone press up a little better   Side bending R 100     Side bending L 100     Rotation R 100     Rotatoin L 100                 Symptom response Mechanical Response     During testing After testing Effect (?? ROM or key functional test) No effect   Pretest symptoms in standing           FIS           RFIS           EIS           MARIZA                       Pretest symptoms lying           BETTE           RFIL           EIL           REIL                       Pretest symptoms           R SGIS           R RSGIS           L SGIS           L RSGIS                       Other movements                    Sensation  - Light touch: intact     Palpation   -wfl             Precautions: standard  Medical History        Past Medical History:   Diagnosis Date   • BPH (benign prostatic hyperplasia)     • Hypertension              Insurance:  AMA/CMS Eval/ Re-eval POC expires Vear Mary Ann #/ Referral # Total   Visits  Start date  Expiration date Extension  Visit limitation? PT only or  PT+OT?  Co-Insurance   CMS 8/22/23 10/31/23      12                  9/25/23 11/17/23                                                                                                            AUTH #:  Date                           Visits  Authed:  Used                             Remaining                               Date  8/30/23 9/6/23 9/11/23 9/13/23 9/18/23 9/20/23 9/25/23   Visit Number  3/12  4/12  5/12  6/12 7/12 8 of 12 9/12 RE                  Manual              P-A mobs    "breaking bread" L2-L3 R, 3 bouts 30, 2x               LAD 4-5 min                                        Neuro Re-ed              TrA progression  20x side glide R       Isos, march 2x10, single leg ext 2x10      Hip add squeeze  20x side glide L            Clamshells   seated reach to right with flexion 2*10 2*10 with blue loop  2*10 blue loop  2*10 blue loop rev     Bridge         2*10 with add squeeze 3x10 w/ ad sq  3*10 with Add squeeze   Sciatic nerve sliders  HOC left prone push up 2*10      leg press 95# 3*10 95# 3x10 total   95# 3*10    KATERINE Seated rear reach and extension to right Standing hip ABD/EXT 2*10 GTB Standing hip ABD/EXT 2*10 GTB Standing hip ABD/EXT 2*10 GTB rev     TA+ march 20x  Lateral walks 10 ft 5 laps GTB Lateral walks 10 ft 5 laps GTB, monster walks 15' 6x       TA + SLR 20x b/l 20x b/l LTR 30x        Bridge with ADD squeeze 20x Bridge with ADD squeeze 20x Bridge w add squeeze 20x Bridge w ADD squeeze 20x above               Squat w/ pallof 18# KB 3x10 total      Thera Ex              MARIZA/REIL          MARIZA over fulcrum x15 2*10   TB shoulder ext  20x 17.5 # CC 20x 17.5 # CC  20x 17.5# CC  20x 17.5# CC rev  17.5# 20x   TB shoulder row  20x 17.5# 20x 22.5 # CC  20x 22.5# CC  20x 22.5# CC rev  20x 17.5#    Paloff press   15x 10# b/l  15x 10#   15x 10#  15x 10# 17.5# 3x10 B     Anti-rotation press      hip burners 20x b/l     Clamshell 3*10 x    ball roll out         3 way x10-15 each               B HS and glute/piri w/ intermittent LAD x 6-7 mins                     Thera Activity              Patient education          20'    Lifting mechanics              Posture education          Lumbar roll edu and issued modified roll                                                                               Modalities

## 2023-09-26 ENCOUNTER — OFFICE VISIT (OUTPATIENT)
Dept: PHYSICAL THERAPY | Facility: CLINIC | Age: 58
End: 2023-09-26
Payer: COMMERCIAL

## 2023-09-26 ENCOUNTER — OFFICE VISIT (OUTPATIENT)
Dept: PAIN MEDICINE | Facility: CLINIC | Age: 58
End: 2023-09-26
Payer: COMMERCIAL

## 2023-09-26 VITALS
BODY MASS INDEX: 34.94 KG/M2 | WEIGHT: 247 LBS | SYSTOLIC BLOOD PRESSURE: 153 MMHG | HEART RATE: 78 BPM | TEMPERATURE: 98.2 F | DIASTOLIC BLOOD PRESSURE: 78 MMHG

## 2023-09-26 DIAGNOSIS — M54.16 LUMBAR RADICULOPATHY: Primary | ICD-10-CM

## 2023-09-26 DIAGNOSIS — M47.816 LUMBAR SPONDYLOSIS: ICD-10-CM

## 2023-09-26 DIAGNOSIS — M54.16 LUMBAR RADICULOPATHY: ICD-10-CM

## 2023-09-26 DIAGNOSIS — M48.061 LUMBAR FORAMINAL STENOSIS: ICD-10-CM

## 2023-09-26 DIAGNOSIS — M51.16 LUMBAR DISC DISEASE WITH RADICULOPATHY: Primary | ICD-10-CM

## 2023-09-26 PROCEDURE — 99214 OFFICE O/P EST MOD 30 MIN: CPT | Performed by: PHYSICAL MEDICINE & REHABILITATION

## 2023-09-26 PROCEDURE — 97110 THERAPEUTIC EXERCISES: CPT

## 2023-09-26 RX ORDER — DULOXETIN HYDROCHLORIDE 60 MG/1
60 CAPSULE, DELAYED RELEASE ORAL DAILY
Qty: 30 CAPSULE | Refills: 1 | Status: SHIPPED | OUTPATIENT
Start: 2023-09-26

## 2023-09-26 NOTE — PROGRESS NOTES
Daily Note     Today's date: 2023  Patient name: Kati Pugh  : 1965  MRN: [id number]  Referring provider: Waymon Holter, DO  Dx:   Encounter Diagnosis     ICD-10-CM    1. Lumbar radiculopathy  M54.16       2. Lumbar foraminal stenosis  M48.061           Start Time: 1230  Stop Time: 1315  Total time in clinic (min): 45 minutes    Subjective: Patient reports that he is feeling better and has lost more weight. Went to Dr Mitchell. Objective: See treatment diary below      Assessment: Tolerated treatment well. Patient would benefit from continued PT in order to build core and postural strength. Did well with the addition of forward and backward ambulation at cable column. Able to increase weight at leg press. Continue to progress with core strengthening. Plan: Continue per plan of care. Precautions: standard  Medical History        Past Medical History:   Diagnosis Date   • BPH (benign prostatic hyperplasia)     • Hypertension              Insurance:  [place]/CMS Eval/ Re-eval POC expires Vear Mary Ann #/ Referral # Total   Visits  Start date  Expiration date Extension  Visit limitation? PT only or  PT+OT?  Co-Insurance   CMS 8/22/23 10/31/23      12                  9/25/23  11/17/23                                                                                                            AUTH #:  Date                           Visits  Authed:  Used                             Remaining                               Date  23   Visit Number  3/12  4/12  5/12  6/12 7/12 8 of 12 9/12 RE 10/12                   Manual               P-A mobs    "breaking bread" L2-L3 R, 3 bouts 30, 2x                LAD 4-5 min                                           Neuro Re-ed               TrA progression  20x side glide R       Isos, march 2x10, single leg ext 2x10       Hip add squeeze  20x side glide L          Standing hip abd/ ext 20x b/l   Clamshells   seated reach to right with flexion 2*10 2*10 with blue loop  2*10 blue loop  2*10 blue loop rev   2*10 blue loop b/l   Bridge         2*10 with add squeeze 3x10 w/ ad sq  3*10 with Add squeeze 3*10 with Add squeeze   Sciatic nerve sliders [address] left prone push up 2*10      leg press 95# 3*10 95# 3x10 total   95# 3*10  115# 40x   KATERINE Seated rear reach and extension to right Standing hip ABD/EXT 2*10 GTB Standing hip ABD/EXT 2*10 GTB Standing hip ABD/EXT 2*10 GTB rev   Standing hip ABD/EXT 2*10 BTB   TA+ march 20x  Lateral walks 10 ft 5 laps GTB Lateral walks 10 ft 5 laps GTB, monster walks 15' 6x     Lateral walks 15' x 6 btb   TA + SLR 20x b/l 20x b/l LTR 30x         Bridge with ADD squeeze 20x Bridge with ADD squeeze 20x Bridge w add squeeze 20x Bridge w ADD squeeze 20x above   Forward/ backward ambulation CC 22. 5#              Squat w/ pallof 18# KB 3x10 total       Thera Ex               MARIZA/REIL          MARIZA over fulcrum x15 2*10 TRX squats 20x   TB shoulder ext  20x 17.5 # CC 20x 17.5 # CC  20x 17.5# CC  20x 17.5# CC rev  17.5# 20x    TB shoulder row  20x 17.5# 20x 22.5 # CC  20x 22.5# CC  20x 22.5# CC rev  20x 17.5#     Paloff press   15x 10# b/l  15x 10#   15x 10#  15x 10# 17.5# 3x10 B      Anti-rotation press      hip burners 20x b/l     Clamshell 3*10 x     ball roll out         3 way x10-15 each                B HS and glute/piri w/ intermittent LAD x 6-7 mins                       Thera Activity               Patient education          20'     Lifting mechanics               Posture education          Lumbar roll edu and issued modified roll                                                                                     Modalities

## 2023-09-26 NOTE — PROGRESS NOTES
Pain Medicine Follow-Up Note    Assessment:  1. Lumbar disc disease with radiculopathy    2. Lumbar radiculopathy    3. Lumbar spondylosis    4. Lumbar foraminal stenosis        Plan:  Mr. Linda Villalobos is a pleasant 24-year-old male who presents to [facility] for follow-up and reevaluation regarding ongoing low back pain with radiating symptoms into the right lower extremity. We previously performed an L3-L4 LESI and have started the patient on Cymbalta now on 60 mg daily with improvements in his pain. He is now on his 6-week of physical therapy and reports this is additionally providing some relief in his pain. Previous MRI lumbar spine done in 2021 and with recent x-rays demonstrating continued degenerative changes most notable at L4-L5 and L5-S1 I believe it would be appropriate to order updated MRI lumbar spine to evaluate for progressive changes contributing to his ongoing radicular pattern of pain into his legs. All questions answered, patient is agreeable with plan. We will follow-up after MRI results. If his pain does get progressively worse may consider interventional approaches but for now we will start with MRI and reorder his Cymbalta. History of Present Illness:    Yolanda Cordero is a 61 y.o. male who presents to [facility] for interval re-evaluation of the above stated pain complaints. The patient has a past medical and chronic pain history as outlined in the assessment section. Patient presents for follow-up and reevaluation regarding ongoing low back pain with radiating symptoms into the right lower extremity. Reports symptoms have gradually improved with Cymbalta and conservative measures with physical therapy. Today reports to and at its worst 6 out of 10 pain that is described as a intermittent burning, dull aching, throbbing, numbness sensation. Presents for follow-up and reevaluation.     Other than as stated above, the patient denies any interval changes in medications, medical condition, mental condition, symptoms, or allergies since the last office visit. Review of Systems:    Review of Systems   Constitutional: Negative for chills and fever. HENT: Negative for ear pain and sore throat. Eyes: Negative for pain and visual disturbance. Respiratory: Negative for cough and shortness of breath. Cardiovascular: Negative for chest pain and palpitations. Gastrointestinal: Negative for abdominal pain and vomiting. Genitourinary: Negative for dysuria and hematuria. Musculoskeletal: Positive for back pain and gait problem. Negative for arthralgias. Skin: Negative for color change and rash. Neurological: Positive for numbness (legs). Negative for seizures and syncope. Psychiatric/Behavioral: Positive for dysphoric mood and sleep disturbance. The patient is nervous/anxious. All other systems reviewed and are negative. Past Medical History:   Diagnosis Date   • BPH (benign prostatic hyperplasia)    • Hypertension        Past Surgical History:   Procedure Laterality Date   • COLONOSCOPY N/A 10/18/2017    Procedure: COLONOSCOPY;  Surgeon: Jona Dorsey MD;  Location: [address] [place] [address] GI LAB; Service: Gastroenterology   • EPIDURAL BLOCK INJECTION Left 09/16/2021    Procedure: L3 L4 transforaminal epidural steroid injection ( [id number]); Surgeon: Aida Rivera MD;  Location: [facility] MAIN OR;  Service: Pain Management    • EPIDURAL BLOCK INJECTION N/A 07/12/2023    Procedure: L3 L4  LUMBAR epidural steroid injection ([id number]);   Surgeon: Cheri Shipman DO;  Location: [facility] MAIN OR;  Service: Pain Management    • HEMORRHOID SURGERY     • LUMBAR EPIDURAL INJECTION N/A 04/19/2023    Procedure: L3 L4 LUMBAR epidural steroid injection [id number];  Surgeon: Cheri Shipman DO;  Location: [facility] MAIN OR;  Service: Pain Management    • SKIN BIOPSY         Family History   Problem Relation Age of Onset   • Heart disease Mother    • Cancer Father         stomach   • Hypertension Father    • Stomach cancer Father    • Skin cancer Father    • Colon cancer Maternal Aunt        Social History     Occupational History   • Not on file   Tobacco Use   • Smoking status: Never   • Smokeless tobacco: Never   Vaping Use   • Vaping Use: Never used   Substance and Sexual Activity   • Alcohol use: Not Currently   • Drug use: No   • Sexual activity: Not on file         Current Outpatient Medications:   •  atorvastatin (LIPITOR) 10 mg tablet, TAKE 1 TABLET DAILY AT BEDTIME, Disp: 30 tablet, Rfl: 6  •  cholecalciferol (VITAMIN D3) 1,000 units tablet, Take 1,000 Units by mouth daily, Disp: , Rfl:   •  doxazosin (CARDURA) 4 mg tablet, Take 4 mg by mouth daily at bedtime, Disp: , Rfl:   •  DULoxetine (CYMBALTA) 60 mg delayed release capsule, Take 1 capsule (60 mg total) by mouth daily, Disp: 30 capsule, Rfl: 1  •  finasteride (PROSCAR) 5 mg tablet, , Disp: , Rfl:   •  tamsulosin (FLOMAX) 0.4 mg, Take 0.4 mg by mouth daily with dinner, Disp: , Rfl:     No Known Allergies    Physical Exam:    /78   Pulse 78   Temp 98.2 °F (36.8 °C)   Wt 112 kg (247 lb)   BMI 34.94 kg/m²     Constitutional:normal, well developed, well nourished, alert, in no distress and non-toxic and no overt pain behavior.   Eyes:anicteric  HEENT:grossly intact  Neck:supple, symmetric, trachea midline and no masses   Pulmonary:even and unlabored  Cardiovascular:No edema or pitting edema present  Skin:Normal without rashes or lesions and well hydrated  Psychiatric:Mood and affect appropriate  Neurologic:Cranial Nerves II-XII grossly intact  Musculoskeletal:normal      Imaging  MRI lumbar spine without contrast    (Results Pending)         Orders Placed This Encounter   Procedures   • MRI lumbar spine without contrast

## 2023-09-27 ENCOUNTER — OFFICE VISIT (OUTPATIENT)
Dept: PHYSICAL THERAPY | Facility: CLINIC | Age: 58
End: 2023-09-27
Payer: COMMERCIAL

## 2023-09-27 DIAGNOSIS — M48.061 LUMBAR FORAMINAL STENOSIS: ICD-10-CM

## 2023-09-27 DIAGNOSIS — M54.16 LUMBAR RADICULOPATHY: Primary | ICD-10-CM

## 2023-09-27 PROCEDURE — 97110 THERAPEUTIC EXERCISES: CPT

## 2023-09-27 PROCEDURE — 97112 NEUROMUSCULAR REEDUCATION: CPT

## 2023-09-27 NOTE — PROGRESS NOTES
Daily Note     Today's date: 2023  Patient name: Dharmesh Lacy  : 1965  MRN: [id number]  Referring provider: Cheri Shipman DO  Dx:   Encounter Diagnosis     ICD-10-CM    1. Lumbar radiculopathy  M54.16       2. Lumbar foraminal stenosis  M48.061                      Subjective: "I feel like I am doing better and I am getting stronger". Last time sex was fine      Objective: See treatment diary below      Assessment: Tolerated treatment well. Patient would benefit from continued PT in order to build quads, hips, glutes and core to support lumbar spine. Patient is making progress and tolerated the addition of new exercises well today. Continue to progress strengthening. Plan: Continue per plan of care. Precautions: standard  Medical History        Past Medical History:   Diagnosis Date   • BPH (benign prostatic hyperplasia)     • Hypertension              Insurance:  A/CMS Eval/ Re-eval POC expires Vishal Gaitan #/ Referral # Total   Visits  Start date  Expiration date Extension  Visit limitation? PT only or  PT+OT?  Co-Insurance   CMS 8/22/23 10/31/23      12                  23                                                                                                            AUTH #:  Date                           Visits  Authed:  Used                             Remaining                               Date  23   Visit Number  3/12  4/12  5/12  6/12 7/12 8 of 12 9/12 RE 10/12 11/12                    Manual                P-A mobs    "breaking bread" L2-L3 R, 3 bouts 30, 2x                 LAD 4-5 min                                              Neuro Re-ed                TrA progression  20x side glide R       Isos, march 2x10, single leg ext 2x10        Hip add squeeze  20x side glide L          Standing hip abd/ ext 20x b/l Standing hip abd/ ext 20x b/l blue loop   Clamshells   seated reach to right with flexion 2*10 2*10 with blue loop  2*10 blue loop  2*10 blue loop rev   2*10 blue loop b/l 2*10 blue loop b/l   Bridge         2*10 with add squeeze 3x10 w/ ad sq  3*10 with Add squeeze 3*10 with Add squeeze 2*10 blue loop b/l   Sciatic nerve sliders  HOC left prone push up 2*10      leg press 95# 3*10 95# 3x10 total   95# 3*10  115# 40x 115# 40x   KATERINE Seated rear reach and extension to right Standing hip ABD/EXT 2*10 GTB Standing hip ABD/EXT 2*10 GTB Standing hip ABD/EXT 2*10 GTB rev   Standing hip ABD/EXT 2*10 BTB standing hip ABD/EXT 2*10 BTB   TA+ march 20x  Lateral walks 10 ft 5 laps GTB Lateral walks 10 ft 5 laps GTB, monster walks 15' 6x     Lateral walks 15' x 6 btb lateral walks 15' x 6 btb   TA + SLR 20x b/l 20x b/l LTR 30x          Bridge with ADD squeeze 20x Bridge with ADD squeeze 20x Bridge w add squeeze 20x Bridge w ADD squeeze 20x above   Forward/ backward ambulation CC 22.5#  [place] carry overhead 5# 50 ft 4x             Squat w/ pallof 18# KB 3x10 total     KB swings 9#   Thera Ex                MARIZA/REIL          MARIZA over fulcrum x15 2*10 TRX squats 20x TRX squats 20x   TB shoulder ext  20x 17.5 # CC 20x 17.5 # CC  20x 17.5# CC  20x 17.5# CC rev  17.5# 20x  20x 17.5#   TB shoulder row  20x 17.5# 20x 22.5 # CC  20x 22.5# CC  20x 22.5# CC rev  20x 17.5#   20x 17.5#   Paloff press   15x 10# b/l  15x 10#   15x 10#  15x 10# 17.5# 3x10 B       Anti-rotation press      hip burners 20x b/l     Clamshell 3*10 x  Clamshell 3*10 x    ball roll out         3 way x10-15 each                 B HS and glute/piri w/ intermittent LAD x 6-7 mins                         Thera Activity                Patient education          20'      Lifting mechanics                Posture education          Lumbar roll edu and issued modified roll                                                                                           Modalities

## 2023-10-02 ENCOUNTER — OFFICE VISIT (OUTPATIENT)
Dept: PHYSICAL THERAPY | Facility: CLINIC | Age: 58
End: 2023-10-02
Payer: COMMERCIAL

## 2023-10-02 DIAGNOSIS — M54.16 LUMBAR RADICULOPATHY: Primary | ICD-10-CM

## 2023-10-02 DIAGNOSIS — M48.061 LUMBAR FORAMINAL STENOSIS: ICD-10-CM

## 2023-10-02 PROCEDURE — 97110 THERAPEUTIC EXERCISES: CPT

## 2023-10-02 PROCEDURE — 97112 NEUROMUSCULAR REEDUCATION: CPT

## 2023-10-02 NOTE — PROGRESS NOTES
Daily Note     Today's date: 10/2/2023  Patient name: Mahi Chaudhari  : 1965  MRN: [id number]  Referring provider: Jim Jane DO  Dx:   Encounter Diagnosis     ICD-10-CM    1. Lumbar radiculopathy  M54.16       2. Lumbar foraminal stenosis  M48.061           Start Time: 1330  Stop Time: 1415  Total time in clinic (min): 45 minutes    Subjective: Reports that he feels that PT is beneficial and that he is becoming stronger      Objective: See treatment diary below      Assessment: Tolerated treatment well. Patient would benefit from continued PT in order to build core and postural strength. Is making steady progress toward goals. Began core strengthening in quadraped. Needed verbal cueing for TA engagement during bird dogs. Discussed positive effects of weight loss with decreased load on lumbar spine. Plan: Continue per plan of care. Precautions: standard  Medical History        Past Medical History:   Diagnosis Date   • BPH (benign prostatic hyperplasia)     • Hypertension              Insurance:  AMA/CMS Eval/ Re-eval POC expires Amina Ernesto #/ Referral # Total   Visits  Start date  Expiration date Extension  Visit limitation? PT only or  PT+OT?  Co-Insurance   CMS 8/22/23 10/31/23      12                  23                                12  10/2  1/3                                                                        AUTH #:  Date                           Visits  Authed:  Used                             Remaining                               Date 9/18/23 9/20/23 9/25/23 9/26/23 9/27/23 10/2/23   Visit Number  RE 10/12 11/12 1/12-FOTO (co)             Manual         P-A mobs                                       Neuro Re-ed         TrA progression Iso 2x10, single leg ext 2x10         Hip add squeeze    Standing hip abd/ ext 20x b/l Standing hip abd/ ext 20x b/l blue loop Standing hip abd/ ext 20x b/l blue loop   Clamshells  rev   2*10 blue loop b/l 2*10 blue loop b/l 2*10 blue loop b/l   Bridge  3x10 w/ ad sq  3*10 with Add squeeze 3*10 with Add squeeze 2*10 blue loop b/l 3*10 with Add squeeze   Sciatic nerve sliders 95# 3x10 total   95# 3*10  115# 40x 115# 40x    KATERINE rev   Standing hip ABD/EXT 2*10 BTB standing hip ABD/EXT 2*10 BTB standing hip ABD/EXT 2*10 BTB   TA+ march 20x    Lateral walks 15' x 6 btb lateral walks 15' x 6 btb    TA + SLR          above   Forward/ backward ambulation CC 22.5#  [place] carry overhead 5# 50 ft 4x Bird dog 15x     Squat w/ pallof 18# KB 3x10 total     KB swings 9# Cat-cow 10x   Thera Ex      CC resisted walk forward/ backward 22.5# 10x ea    MARIZA/REIL  MARIZA over fulcrum x15 2*10 TRX squats 20x TRX squats 20x quadraped single leg extension 10x   TB shoulder ext rev  17.5# 20x  20x 17.5#    TB shoulder row rev  20x 17.5#   20x 17.5#    Paloff press  17.5# 3x10 B        Anti-rotation press   Clamshell 3*10 x  Clamshell 3*10 x Clamshell 25x blue loop    ball roll out 3 way x10-15 each     Leg press 105# 4*10     B HS and glute/piri w/ intermittent LAD x 6-7 mins                   Thera Activity         Patient education  20'       Lifting mechanics         Posture education  Lumbar roll edu and issued modified roll                                                         Modalities

## 2023-10-04 ENCOUNTER — OFFICE VISIT (OUTPATIENT)
Dept: PHYSICAL THERAPY | Facility: CLINIC | Age: 58
End: 2023-10-04
Payer: COMMERCIAL

## 2023-10-04 DIAGNOSIS — M54.16 LUMBAR RADICULOPATHY: Primary | ICD-10-CM

## 2023-10-04 DIAGNOSIS — M48.061 LUMBAR FORAMINAL STENOSIS: ICD-10-CM

## 2023-10-04 PROCEDURE — 97112 NEUROMUSCULAR REEDUCATION: CPT

## 2023-10-04 PROCEDURE — 97110 THERAPEUTIC EXERCISES: CPT

## 2023-10-04 NOTE — PROGRESS NOTES
Daily Note     Today's date: 10/4/2023  Patient name: Sachin Barfield  : 1965  MRN: [id number]  Referring provider: Haile Ferreira DO  Dx:   Encounter Diagnosis     ICD-10-CM    1. Lumbar radiculopathy  M54.16       2. Lumbar foraminal stenosis  M48.061                      Subjective: Patient reports reduction in sxs      Objective: See treatment diary below      Assessment: Tolerated treatment well. Patient would benefit from continued PT in order to continue building core and postural strength. Did well with introduction of lat pulldown. Add in supine table top 90-90 with resisted arm extension next session. Has appointment soon for MRI and 3rd spinal shot. Went up in weight on leg press. Continue to progress strengthening. Plan: Continue per plan of care. Precautions: standard  Medical History        Past Medical History:   Diagnosis Date   • BPH (benign prostatic hyperplasia)     • Hypertension              Insurance:  [place]/CMS Eval/ Re-eval POC expires Juan Gomez #/ Referral # Total   Visits  Start date  Expiration date Extension  Visit limitation? PT only or  PT+OT?  Co-Insurance   CMS 8/22/23 10/31/23      12                  23                                12  10/2  1/3            auth pending perJZ  10/4                                                          AUTH #:  Date                           Visits  Authed:  Used                             Remaining                               Date 9/18/23 9/20/23 9/25/23 9/26/23 9/27/23 10/2/23 10/4/23   Visit Number  RE 10/12 11/12 12/12-FOTO (co) -1              Manual          P-A mobs                                           Neuro Re-ed          TrA progression Isos, march 2x10, single leg ext 2x10          Hip add squeeze    Standing hip abd/ ext 20x b/l Standing hip abd/ ext 20x b/l blue loop Standing hip abd/ ext 20x b/l blue loop Standing hip abd/ ext 20x b/l blue loop   Clamshells  rev   2*10 blue loop b/l 2*10 blue loop b/l 2*10 blue loop b/l    Bridge  3x10 w/ ad sq  3*10 with Add squeeze 3*10 with Add squeeze 2*10 blue loop b/l 3*10 with Add squeeze    Sciatic nerve sliders 95# 3x10 total   95# 3*10  115# 40x 115# 40x  Lateral walks 15' 6 laps BTB   KATERINE rev   Standing hip ABD/EXT 2*10 BTB standing hip ABD/EXT 2*10 BTB standing hip ABD/EXT 2*10 BTB standing hip ABD/EXT 2*10 BTB   TA+ march 20x    Lateral walks 15' x 6 btb lateral walks 15' x 6 btb     TA + SLR       Leg press 115# 4*10    above   Forward/ backward ambulation CC 22.5#  [place] carry overhead 5# 50 ft 4x Bird dog 15x Kb squat with press 18# 20x     Squat w/ pallof 18# KB 3x10 total     KB swings 9# Cat-cow 10x Lat pull down 55# 20x + TA   Thera Ex      CC resisted walk forward/ backward 22.5# 10x ea     MARIZA/REIL  MARIZA over fulcrum x15 2*10 TRX squats 20x TRX squats 20x quadraped single leg extension 10x Paloff press 7.5# 15x b/l   TB shoulder ext rev  17.5# 20x  20x 17.5#     TB shoulder row rev  20x 17.5#   20x 17.5#     Paloff press  17.5# 3x10 B         Anti-rotation press   Clamshell 3*10 x  Clamshell 3*10 x Clamshell 25x blue loop     ball roll out 3 way x10-15 each     Leg press 105# 4*10      B HS and glute/piri w/ intermittent LAD x 6-7 mins                     Thera Activity          Patient education  20'        Lifting mechanics          Posture education  Lumbar roll edu and issued modified roll                                                               Modalities

## 2023-10-09 ENCOUNTER — OFFICE VISIT (OUTPATIENT)
Dept: PHYSICAL THERAPY | Facility: CLINIC | Age: 58
End: 2023-10-09
Payer: COMMERCIAL

## 2023-10-09 DIAGNOSIS — M54.16 LUMBAR RADICULOPATHY: Primary | ICD-10-CM

## 2023-10-09 DIAGNOSIS — M48.061 LUMBAR FORAMINAL STENOSIS: ICD-10-CM

## 2023-10-09 PROCEDURE — 97110 THERAPEUTIC EXERCISES: CPT

## 2023-10-09 PROCEDURE — 97112 NEUROMUSCULAR REEDUCATION: CPT

## 2023-10-09 NOTE — PROGRESS NOTES
Daily Note     Today's date: 10/9/2023  Patient name: Iker King  : 1965  MRN: [id number]  Referring provider: Bettina Edouard DO  Dx:   Encounter Diagnosis     ICD-10-CM    1. Lumbar radiculopathy  M54.16       2. Lumbar foraminal stenosis  M48.061           Start Time: 1330  Stop Time: 1415  Total time in clinic (min): 45 minutes    Subjective: Reports that lower back symptoms have remained largely the same but symptoms in thighs have decreased      Objective: See treatment diary below      Assessment: Tolerated treatment well. Patient would benefit from continued PT in order to continue building core and postural musculature and strength. Fatigued by KB sit to stand with chest push. Continue to progress strengthening. Did well with addition of rows. Plan: Continue per plan of care. Precautions: standard  Medical History        Past Medical History:   Diagnosis Date   • BPH (benign prostatic hyperplasia)     • Hypertension              Insurance:  A/CMS Eval/ Re-eval POC expires Chyna Pacheco #/ Referral # Total   Visits  Start date  Expiration date Extension  Visit limitation? PT only or  PT+OT?  Co-Insurance   CMS 8/22/23 10/31/23      12                  9/25/23  11/17/23                                12  10/2  1/3            auth pending per  10/4                                                          AUTH #:  Date                           Visits  Authed:  Used                             Remaining                               Date 9/18/23 9/20/23 9/25/23 9/26/23 9/27/23 10/2/23 10/4/23 10/9/23   Visit Number  RE 10/12 11/12 12/12-FOTO (co) -1                Manual           P-A mobs                                               Neuro Re-ed           TrA progression Isos, march 2x10, single leg ext 2x10           Hip add squeeze    Standing hip abd/ ext 20x b/l Standing hip abd/ ext 20x b/l blue loop Standing hip abd/ ext 20x b/l blue loop Standing hip abd/ ext 20x b/l blue loop    Clamshells  rev   2*10 blue loop b/l 2*10 blue loop b/l 2*10 blue loop b/l     Bridge  3x10 w/ ad sq  3*10 with Add squeeze 3*10 with Add squeeze 2*10 blue loop b/l 3*10 with Add squeeze     Sciatic nerve sliders 95# 3x10 total   95# 3*10  115# 40x 115# 40x  Lateral walks 15' 6 laps BTB Lateral walks 15' 6 laps BTB   KATERINE rev   Standing hip ABD/EXT 2*10 BTB standing hip ABD/EXT 2*10 BTB standing hip ABD/EXT 2*10 BTB standing hip ABD/EXT 2*10 BTB tanding hip ABD/EXT 2*10 BTB   TA+ march 20x    Lateral walks 15' x 6 btb lateral walks 15' x 6 btb      TA + SLR       Leg press 115# 4*10 Leg press 115# 4*10    above   Forward/ backward ambulation CC 22.5#  [place] carry overhead 5# 50 ft 4x Bird dog 15x Kb squat with press 18# 20x Lateral walks 15' 6 laps BT     Squat w/ pallof 18# KB 3x10 total     KB swings 9# Cat-cow 10x Lat pull down 55# 20x + TA Kb squat with press 18# 20   Thera Ex      CC resisted walk forward/ backward 22.5# 10x ea      MARIZA/REIL  MARIZA over fulcrum x15 2*10 TRX squats 20x TRX squats 20x quadraped single leg extension 10x Paloff press 7.5# 15x b/l Paloff press 7.5# 15x b/l   TB shoulder ext rev  17.5# 20x  20x 17.5#   Row 18# 2*10 b/l   TB shoulder row rev  20x 17.5#   20x 17.5#   KB swing 2*10 18#   Paloff press  17.5# 3x10 B          Anti-rotation press   Clamshell 3*10 x  Clamshell 3*10 x Clamshell 25x blue loop      ball roll out 3 way x10-15 each     Leg press 105# 4*10       B HS and glute/piri w/ intermittent LAD x 6-7 mins                       Thera Activity           Patient education  20'         Lifting mechanics           Posture education  Lumbar roll edu and issued modified roll                                                                     Modalities

## 2023-10-11 ENCOUNTER — OFFICE VISIT (OUTPATIENT)
Dept: PHYSICAL THERAPY | Facility: CLINIC | Age: 58
End: 2023-10-11
Payer: COMMERCIAL

## 2023-10-11 DIAGNOSIS — M54.16 LUMBAR RADICULOPATHY: Primary | ICD-10-CM

## 2023-10-11 DIAGNOSIS — M48.061 LUMBAR FORAMINAL STENOSIS: ICD-10-CM

## 2023-10-11 PROCEDURE — 97110 THERAPEUTIC EXERCISES: CPT

## 2023-10-11 NOTE — PROGRESS NOTES
Daily Note     Today's date: 10/11/2023  Patient name: Gi Luna  : 1965  MRN: [id number]  Referring provider: Mikey Burden DO  Dx:   Encounter Diagnosis     ICD-10-CM    1. Lumbar radiculopathy  M54.16       2. Lumbar foraminal stenosis  M48.061           Start Time: 1200  Stop Time: 1245  Total time in clinic (min): 45 minutes    Subjective: Reports being a little under the weather as he had both flu and COVID shot. Objective: See treatment diary below      Assessment: Tolerated treatment well. Patient would benefit from continued PT in order to manage lumbar radicular sxs by increasing postural and core strength. Did well with the addition of ball wall squats. Was fatigued by end of session. Plan: Continue per plan of care. Precautions: standard  Medical History        Past Medical History:   Diagnosis Date    BPH (benign prostatic hyperplasia)      Hypertension              Insurance:  [place]/CMS Eval/ Re-eval POC expires Noberto Cipro #/ Referral # Total   Visits  Start date  Expiration date Extension  Visit limitation? PT only or  PT+OT?  Co-Insurance   CMS 8/22/23 10/31/23      12                  9/25/23  11/17/23                                12  10/2  1/3            auth pending per  10/4                                                          AUTH #:  Date                           Visits  Authed:  Used                             Remaining                               Date 9/18/23 9/20/23 9/25/23 9/26/23 9/27/23 10/2/23 10/4/23 10/9/23 10/11/23   Visit Number  RE 10/12 11/12 12/12-FOTO (co) -1 2/12 3/12                Manual            P-A mobs                                                   Neuro Re-ed            TrA progression , march 2x10, single leg ext 2x10         Hip burners 15x 5 sec hold b/l   Hip add squeeze    Standing hip abd/ ext 20x b/l Standing hip abd/ ext 20x b/l blue loop Standing hip abd/ ext 20x b/l blue loop Standing hip abd/ ext 20x b/l blue loop  standing hip abd/ ext 20x b/l blue loop   Clamshells  rev   2*10 blue loop b/l 2*10 blue loop b/l 2*10 blue loop b/l      Bridge  3x10 w/ ad sq  3*10 with Add squeeze 3*10 with Add squeeze 2*10 blue loop b/l 3*10 with Add squeeze      Sciatic nerve sliders 95# 3x10 total   95# 3*10  115# 40x 115# 40x  Lateral walks 15' 6 laps BTB Lateral walks 15' 6 laps BTB Lateral walks 15' 6 laps BTB   KATERINE rev   Standing hip ABD/EXT 2*10 BTB standing hip ABD/EXT 2*10 BTB standing hip ABD/EXT 2*10 BTB standing hip ABD/EXT 2*10 BTB tanding hip ABD/EXT 2*10 BTB    TA+ march 20x    Lateral walks 15' x 6 btb lateral walks 15' x 6 btb       TA + SLR       Leg press 115# 4*10 Leg press 115# 4*10 # 4*10    above   Forward/ backward ambulation CC 22.5#  [place] carry overhead 5# 50 ft 4x Bird dog 15x Kb squat with press 18# 20x Lateral walks 15' 6 laps BT standing hip ABD/EXT 2*10 BTB     Squat w/ pallof 18# KB 3x10 total     KB swings 9# Cat-cow 10x Lat pull down 55# 20x + TA Kb squat with press 18# 20 Kb squat with press 18# 20   Thera Ex      CC resisted walk forward/ backward 22.5# 10x ea       MARIZA/REIL  MARIZA over fulcrum x15 2*10 TRX squats 20x TRX squats 20x quadraped single leg extension 10x Paloff press 7.5# 15x b/l Paloff press 7.5# 15x b/l    TB shoulder ext rev  17.5# 20x  20x 17.5#   Row 18# 2*10 b/l    TB shoulder row rev  20x 17.5#   20x 17.5#   KB swing 2*10 18#    Paloff press  17.5# 3x10 B           Anti-rotation press   Clamshell 3*10 x  Clamshell 3*10 x Clamshell 25x blue loop       ball roll out 3 way x10-15 each     Leg press 105# 4*10        B HS and glute/piri w/ intermittent LAD x 6-7 mins                         Thera Activity            Patient education  20'          Lifting mechanics            Posture education  Lumbar roll edu and issued modified roll                                                                           Modalities

## 2023-10-13 ENCOUNTER — HOSPITAL ENCOUNTER (OUTPATIENT)
Dept: RADIOLOGY | Facility: HOSPITAL | Age: 58
Discharge: HOME | End: 2023-10-13
Attending: PHYSICAL MEDICINE & REHABILITATION
Payer: COMMERCIAL

## 2023-10-13 DIAGNOSIS — M54.16 LUMBAR RADICULOPATHY: ICD-10-CM

## 2023-10-13 DIAGNOSIS — M51.16 LUMBAR DISC DISEASE WITH RADICULOPATHY: ICD-10-CM

## 2023-10-13 DIAGNOSIS — M47.816 LUMBAR SPONDYLOSIS: ICD-10-CM

## 2023-10-13 PROCEDURE — G1004 CDSM NDSC: HCPCS

## 2023-10-13 PROCEDURE — 72148 MRI LUMBAR SPINE W/O DYE: CPT

## 2023-10-16 ENCOUNTER — OFFICE VISIT (OUTPATIENT)
Dept: PHYSICAL THERAPY | Facility: CLINIC | Age: 58
End: 2023-10-16
Payer: COMMERCIAL

## 2023-10-16 DIAGNOSIS — M48.061 LUMBAR FORAMINAL STENOSIS: ICD-10-CM

## 2023-10-16 DIAGNOSIS — M54.16 LUMBAR RADICULOPATHY: Primary | ICD-10-CM

## 2023-10-16 NOTE — PROGRESS NOTES
Discharge/Daily Note     Today's date: 10/16/2023  Patient name: Portia Benítez  : 1965  MRN: [id number]  Referring provider: Jerral Sicard, DO  Dx: No diagnosis found. Start Time: 1330  Stop Time: 1415  Total time in clinic (min): 45 minutes    Subjective: Reports that he has been getting better but has not yet reached goals. Lower back pain has remained but is diminished, pain in thigh has mostly resolved      Objective: See treatment diary below      Assessment: Tolerated treatment well. Patient would benefit from continued PT in order to continue build core and postural strength. Has been responding well to treatment but still has lower back pain. Discharge to [facility] Given directions to contact [facility] physical therapy with any future concern or physical therapy needs. Plan: Discharge to [facility]     Precautions: standard  Medical History        Past Medical History:   Diagnosis Date    BPH (benign prostatic hyperplasia)      Hypertension              Insurance:  [place]/CMS Eval/ Re-eval POC expires Edger Fast #/ Referral # Total   Visits  Start date  Expiration date Extension  Visit limitation? PT only or  PT+OT?  Co-Insurance   CMS 8/22/23 10/31/23      12                  9/25/23  11/17/23                                12  10/2  1/3            auth pending per  10/4                                                          AUTH #:  Date                           Visits  Authed:  Used                             Remaining                               Date 9/25/23 9/26/23 9/27/23 10/2/23 10/4/23 10/9/23 10/11/23 10/16/23   Visit Number  RE 10/12 11/12 12/12-FOTO (co) -1 2/12 3/12                Manual           P-A mobs                                               Neuro Re-ed           TrA progression       Hip burners 15x 5 sec hold b/l    Hip add squeeze  Standing hip abd/ ext 20x b/l Standing hip abd/ ext 20x b/l blue loop Standing hip abd/ ext 20x b/l blue loop Standing hip abd/ ext 20x b/l blue loop  standing hip abd/ ext 20x b/l blue loop standing hip abd/ ext 20x b/l blue loop   Clamshells   2*10 blue loop b/l 2*10 blue loop b/l 2*10 blue loop b/l       Bridge  3*10 with Add squeeze 3*10 with Add squeeze 2*10 blue loop b/l 3*10 with Add squeeze       Sciatic nerve sliders 95# 3*10  115# 40x 115# 40x  Lateral walks 15' 6 laps BTB Lateral walks 15' 6 laps BTB Lateral walks 15' 6 laps BTB Lateral walks 15' 6 laps BTB   KATERINE  Standing hip ABD/EXT 2*10 BTB standing hip ABD/EXT 2*10 BTB standing hip ABD/EXT 2*10 BTB standing hip ABD/EXT 2*10 BTB tanding hip ABD/EXT 2*10 BTB     TA+ march 20x  Lateral walks 15' x 6 btb lateral walks 15' x 6 btb        TA + SLR     Leg press 115# 4*10 Leg press 115# 4*10 # 4*10 # 4*10     Forward/ backward ambulation CC 22.5#  [place] carry overhead 5# 50 ft 4x Bird dog 15x Kb squat with press 18# 20x Lateral walks 15' 6 laps BT standing hip ABD/EXT 2*10 BTB anding hip ABD/EXT 2*10 BTB       KB swings 9# Cat-cow 10x Lat pull down 55# 20x + TA Kb squat with press 18# 20 Kb squat with press 18# 20 Kb squat with press 18# 20   Thera Ex    CC resisted walk forward/ backward 22.5# 10x ea     CC standing ext and row 17.5# 20x b/l   MARIZA/REIL 2*10 TRX squats 20x TRX squats 20x quadraped single leg extension 10x Paloff press 7.5# 15x b/l Paloff press 7.5# 15x b/l     TB shoulder ext 17.5# 20x  20x 17.5#   Row 18# 2*10 b/l     TB shoulder row 20x 17.5#   20x 17.5#   KB swing 2*10 18#     Paloff press            Anti-rotation press Clamshell 3*10 x  Clamshell 3*10 x Clamshell 25x blue loop        ball roll out    Leg press 105# 4*10                               Thera Activity           Patient education           Lifting mechanics           Posture education                                                                       Modalities                           Access Code: [email or web address]  URL: [email or web address]  Date: 10/16/2023  Prepared by: Lilibeth Medina Jina-Narayan    Exercises  - Supine Bridge  - 1 x daily - 7 x weekly - 3 sets - 10 reps  - Sidelying Hip Abduction  - 1 x daily - 7 x weekly - 3 sets - 10 reps  - Clamshell  - 1 x daily - 7 x weekly - 3 sets - 10 reps  - Clamshell with Resistance  - 1 x daily - 7 x weekly - 3 sets - 10 reps  - Supine Lower Trunk Rotation  - 1 x daily - 7 x weekly - 3 sets - 10 reps  - Supine March  - 1 x daily - 7 x weekly - 3 sets - 10 reps  - Sit to Stand  - 1 x daily - 7 x weekly - 3 sets - 10 reps  - Standing 3-Way Leg Reach with Resistance at Ankles and Counter Support  - 1 x daily - 7 x weekly - 3 sets - 10 reps  - Side Stepping with Resistance at Ankles  - 1 x daily - 7 x weekly - 3 sets - 10 reps  - Single Arm Shoulder Extension with Anchored Resistance  - 1 x daily - 7 x weekly - 3 sets - 10 reps  - Standing Shoulder Row with Anchored Resistance  - 1 x daily - 7 x weekly - 3 sets - 10 reps  - Standing Row with Resistance with Anchored Resistance at Chest Height Palms Down  - 1 x daily - 7 x weekly - 3 sets - 10 reps  - Standing High Row with Anchored Resistance  - 1 x daily - 7 x weekly - 3 sets - 10 reps

## 2023-10-18 ENCOUNTER — TELEPHONE (OUTPATIENT)
Age: 58
End: 2023-10-18

## 2023-10-18 ENCOUNTER — APPOINTMENT (OUTPATIENT)
Dept: PHYSICAL THERAPY | Facility: CLINIC | Age: 58
End: 2023-10-18
Payer: COMMERCIAL

## 2023-10-18 NOTE — TELEPHONE ENCOUNTER
--results are pending--    S/W pt. Advised pt results are pending and SPA will call him once results are final.  He stated he does not have an answering machine or caller ID. Pt verbalized understanding.

## 2023-10-18 NOTE — TELEPHONE ENCOUNTER
Caller: Liu Welch pt    Doctor: Lluvia Benedict    Reason for call: pt looking for MRI results    Call back#: [phone]

## 2023-10-19 ENCOUNTER — TELEPHONE (OUTPATIENT)
Dept: PAIN MEDICINE | Facility: CLINIC | Age: 58
End: 2023-10-19

## 2023-10-19 NOTE — TELEPHONE ENCOUNTER
----- Message from Jerral Sicard, DO sent at 10/19/2023  9:26 AM EDT -----  Please notify patient of MRI lumbar spine results demonstrating multilevel degenerative disc disease and mild central and foraminal narrowing most notable at L4-L5 and 5-S1  Pain was controlled with Cymbalta at last visit  As long as patient is doing well would continue with medication management, however, if his pain is gone progressively worse would consider L4-L5 LESI under fluoroscopy guidance  Please advise  Thank you  ----- Message -----  From: Jessica, Radiology Results In  Sent: 10/18/2023   4:07 PM EDT  To: Jerral Sicard, DO

## 2023-10-23 ENCOUNTER — APPOINTMENT (OUTPATIENT)
Dept: PHYSICAL THERAPY | Facility: CLINIC | Age: 58
End: 2023-10-23
Payer: COMMERCIAL

## 2023-10-23 NOTE — TELEPHONE ENCOUNTER
Last injection July 2023  Would proceed with L4-L5 LESI under fluoroscopy guidance  Please advise and schedule  Thank you

## 2023-10-23 NOTE — TELEPHONE ENCOUNTER
S/W pt. Advised pt of the same. Pt stated he still has pain in his b/l back. Pain is 5/10. Pt stated he would like an injection. His leg pain is a little better. Pt stated "I need an injection. I thought Dr. Isha Schmid said I should have a third injection by December."  Pt verbalized understanding. Please advise.

## 2023-10-23 NOTE — TELEPHONE ENCOUNTER
Scheduled patient for 11/15/23 for [facility]  Patient denies RX blood thinners/ NSAIDS  Nothing to eat or drink 1 hour prior to procedure  Needs to arrange transportation  Proper clothing for procedure  No vaccines 2 weeks prior or after procedure  If ill or place on antibiotics, please call to reschedule

## 2023-10-25 ENCOUNTER — APPOINTMENT (OUTPATIENT)
Dept: PHYSICAL THERAPY | Facility: CLINIC | Age: 58
End: 2023-10-25
Payer: COMMERCIAL

## 2023-11-15 ENCOUNTER — HOSPITAL ENCOUNTER (OUTPATIENT)
Facility: AMBULARY SURGERY CENTER | Age: 58
Setting detail: OUTPATIENT SURGERY
Discharge: HOME | End: 2023-11-15
Attending: PHYSICAL MEDICINE & REHABILITATION | Admitting: PHYSICAL MEDICINE & REHABILITATION
Payer: COMMERCIAL

## 2023-11-15 ENCOUNTER — APPOINTMENT (OUTPATIENT)
Dept: RADIOLOGY | Facility: HOSPITAL | Age: 58
End: 2023-11-15
Payer: COMMERCIAL

## 2023-11-15 VITALS
RESPIRATION RATE: 16 BRPM | SYSTOLIC BLOOD PRESSURE: 138 MMHG | OXYGEN SATURATION: 97 % | DIASTOLIC BLOOD PRESSURE: 78 MMHG | HEART RATE: 77 BPM | TEMPERATURE: 97.2 F

## 2023-11-15 PROCEDURE — 62323 NJX INTERLAMINAR LMBR/SAC: CPT | Performed by: PHYSICAL MEDICINE & REHABILITATION

## 2023-11-15 RX ORDER — METHYLPREDNISOLONE ACETATE 80 MG/ML
INJECTION, SUSPENSION INTRA-ARTICULAR; INTRALESIONAL; INTRAMUSCULAR; SOFT TISSUE AS NEEDED
Status: DISCONTINUED | OUTPATIENT
Start: 2023-11-15 | End: 2023-11-15 | Stop reason: HOSPADM

## 2023-11-15 RX ORDER — LIDOCAINE HYDROCHLORIDE 10 MG/ML
INJECTION, SOLUTION EPIDURAL; INFILTRATION; INTRACAUDAL; PERINEURAL AS NEEDED
Status: DISCONTINUED | OUTPATIENT
Start: 2023-11-15 | End: 2023-11-15 | Stop reason: HOSPADM

## 2023-11-15 NOTE — DISCHARGE INSTRUCTIONS
Epidural Steroid Injection   WHAT YOU NEED TO KNOW:   An epidural steroid injection (KESHAV) is a procedure to inject steroid medicine into the epidural space. The epidural space is between your spinal cord and vertebrae. Steroids reduce inflammation and fluid buildup in your spine that may be causing pain. You may be given pain medicine along with the steroids. ACTIVITY  Do not drive or operate machinery today. No strenuous activity today - bending, lifting, etc.  You may resume normal activites starting tomorrow - start slowly and as tolerated. You may shower today, but no tub baths or hot tubs. You may have numbness for several hours from the local anesthetic. Please use caution and common sense, especially with weight-bearing activities. CARE OF THE INJECTION SITE  If you have soreness or pain, apply ice to the area today (20 minutes on/20 minutes off). Starting tomorrow, you may use warm, moist heat or ice if needed. You may have an increase or change in your discomfort for 36-48 hours after your treatment. Apply ice and continue with any pain medication you have been prescribed. Notify the [facility] if you have any of the following: redness, drainage, swelling, headache, stiff neck or fever above 100°F.    SPECIAL INSTRUCTIONS  Our office will contact you in approximately 7 days for a progress report. MEDICATIONS  Continue to take all routine medications. Our office may have instructed you to hold some medications. As no general anesthesia was used in today's procedure, you should not experience any side effects related to anesthesia. If you are diabetic, the steroids used in today's injection may temporarily increase your blood sugar levels after the first few days after your injection. Please keep a close eye on your sugars and alert the doctor who manages your diabetes if your sugars are significantly high from your baseline or you are symptomatic.      If you have a problem specifically related to your procedure, please call our office at [phone]. Problems not related to your procedure should be directed to your primary care physician.

## 2023-11-15 NOTE — OP NOTE
OPERATIVE REPORT  PATIENT NAME: Azul Townsend    :  1965  MRN: [id number]  Pt Location: [facility] MINOR/PAIN ROOM 01    SURGERY DATE: 11/15/2023    Surgeon(s) and Role:     * Ed Mortensen DO - Primary    Preop Diagnosis:  Lumbar disc disease with radiculopathy [M51.16]  Lumbar radiculopathy [M54.16]  Lumbar facet arthropathy [M47.816]    Post-Op Diagnosis Codes:     * Lumbar disc disease with radiculopathy [M51.16]     * Lumbar radiculopathy [M54.16]     * Lumbar facet arthropathy [M47.816]    Procedure(s):  L4-L5  LUMBAR epidural steroid injection (07396)    Lumbar epidural  Indication:  Back and radiating leg pain  Preoperative diagnosis:  Lumbar radiculitis  Postoperative diagnosis:  Lumbar radiculitis    Procedure: Fluoroscopically-guided L4-L5 interlaminar epidural steroid injection under fluoroscopy    EBL:  none  Specimens:  not applicable    After discussing the risks, benefits, and alternatives to the procedure, the patient expressed understanding and wished to proceed. The patient was brought to the fluoroscopy suite and placed in the prone position. A procedural pause was conducted to verify:  correct patient identity, procedure to be performed and as applicable, correct side and site, correct patient position, and availability of implants, special equipment and special requirements. After identifying the L4-L5 space fluoroscopically, the skin was sterilely prepped and draped in the usual fashion using Chloraprep skin prep. The skin and subcutaneous tissues were anesthetized with 1% lidocaine. Utilizing a loss of resistance technique and intermittent fluoroscopic guidance, a 6 inch 20-gauge Tuohy needle was advanced into the epidural space. Proper needle positioning was confirmed using multiple fluoroscopic views. After negative aspiration, Omnipaque to 40 contrast was injected confirming epidural spread without evidence of intravascular or intrathecal spread.   A 4 ml solution consisting of 80 mg Depo-Medrol in sterile saline was injected slowly and incrementally into the epidural space. Following the injection the needle was withdrawn slightly and flushed with 1% buffered lidocaine as it was fully extracted. The patient tolerated the procedure well and there were no apparent complications. After appropriate observation, the patient was dismissed from the clinic in good condition under their own power.       SIGNATURE: Gautam Coles DO  DATE: November 15, 2023  TIME: 3:32 PM

## 2023-11-15 NOTE — H&P
History of Present Illness: The patient is a 60 y.o. male who presents with complaints of low back pain    Past Medical History:   Diagnosis Date    BPH (benign prostatic hyperplasia)     Hypertension        Past Surgical History:   Procedure Laterality Date    COLONOSCOPY N/A 10/18/2017    Procedure: COLONOSCOPY;  Surgeon: Sarah Chandler MD;  Location: [address] [place] [address] GI LAB; Service: Gastroenterology    EPIDURAL BLOCK INJECTION Left 09/16/2021    Procedure: L3 L4 transforaminal epidural steroid injection ( [id number]); Surgeon: Kacy Faye MD;  Location: [facility] OR;  Service: Pain Management     EPIDURAL BLOCK INJECTION N/A 07/12/2023    Procedure: L3 L4  LUMBAR epidural steroid injection ([id number]); Surgeon: Hitesh Shell DO;  Location: [facility] OR;  Service: Pain Management     HEMORRHOID SURGERY      LUMBAR EPIDURAL INJECTION N/A 04/19/2023    Procedure: L3 L4 LUMBAR epidural steroid injection [id number];  Surgeon: Hitesh Shell DO;  Location: [facility] OR;  Service: Pain Management     SKIN BIOPSY         No current facility-administered medications for this encounter.     No Known Allergies    Physical Exam:   Vitals:    11/15/23 1455   BP: 142/75   Pulse: 77   Resp: 18   Temp: (!) 97.2 °F (36.2 °C)   SpO2: 95%     General: Awake, Alert, Oriented x 3, Mood and affect appropriate  Respiratory: Respirations even and unlabored  Cardiovascular: Peripheral pulses intact; no edema  Musculoskeletal Exam: Tenderness to palpation bilateral lumbar paraspinals    ASA Score: 2    Patient/Chart Verification  Patient ID Verified: Verbal, Armband  ID Band Applied: Yes  Consents Confirmed: Procedural  H&P( within 30 days) Verified: Yes  Interval H&P(within 24 hr) Complete (required for Outpatients and Surgery Admit only): Yes  Beta Blocker given : No  Pre-op Lab/Test Results Available: N/A  Does Patient Have a Prosthetic Device/Implant: No    Assessment: Lumbar radiculopathy    Plan: L4-L5 LESI

## 2023-11-22 ENCOUNTER — TELEPHONE (OUTPATIENT)
Dept: PAIN MEDICINE | Facility: CLINIC | Age: 58
End: 2023-11-22

## 2023-11-22 NOTE — TELEPHONE ENCOUNTER
1st attempt  Unable to lm to cb with % improvement and pain level.  Call cannot be completed at this time

## 2023-11-24 NOTE — TELEPHONE ENCOUNTER
Patient Reports     50    %     improvement post injection    Pain Level     3/10    Patient is aware we will call back next week for an update .

## 2023-11-28 ENCOUNTER — OFFICE VISIT (OUTPATIENT)
Age: 58
End: 2023-11-28
Payer: COMMERCIAL

## 2023-11-28 VITALS
DIASTOLIC BLOOD PRESSURE: 67 MMHG | SYSTOLIC BLOOD PRESSURE: 116 MMHG | BODY MASS INDEX: 34.58 KG/M2 | RESPIRATION RATE: 16 BRPM | HEIGHT: 71 IN | HEART RATE: 68 BPM | WEIGHT: 247 LBS

## 2023-11-28 DIAGNOSIS — M20.41 HAMMER TOES OF BOTH FEET: ICD-10-CM

## 2023-11-28 DIAGNOSIS — B35.1 ONYCHOMYCOSIS: ICD-10-CM

## 2023-11-28 DIAGNOSIS — M21.962 ACQUIRED DEFORMITY OF LEFT FOOT: ICD-10-CM

## 2023-11-28 DIAGNOSIS — L60.0 INGROWN TOENAIL: ICD-10-CM

## 2023-11-28 DIAGNOSIS — M20.42 HAMMER TOES OF BOTH FEET: ICD-10-CM

## 2023-11-28 DIAGNOSIS — M21.961 ACQUIRED DEFORMITY OF RIGHT FOOT: ICD-10-CM

## 2023-11-28 DIAGNOSIS — M79.671 PAIN IN BOTH FEET: Primary | ICD-10-CM

## 2023-11-28 DIAGNOSIS — M79.672 PAIN IN BOTH FEET: Primary | ICD-10-CM

## 2023-11-28 PROCEDURE — 99213 OFFICE O/P EST LOW 20 MIN: CPT | Performed by: PODIATRIST

## 2023-11-28 NOTE — PROGRESS NOTES
Assessment/Plan:  Pain upon ambulation. Hammertoe formation. Pes planus. Need for orthotics. Ingrown toenail. Paronychia. Acquired deformity foot bilateral.  Acquired pes planus bilateral.  History of contusion right big toe     Plan. Chart reviewed. Foot exam performed. Patient educated on condition. Patient will use orthotics daily. He will stretch. All nails debrided without pain or complication. Patient advised on aftercare. Diagnoses and all orders for this visit:     Pain in both feet     Onychomycosis     Hammer toes of both feet     Ingrown toenail     Hammer toe of right foot     Acquired deformity foot bilateral        Acquired pes planus. Subjective:  Patient has pain upon ambulation. He has pain in his toes with ambulation. He has painful hammertoes. He uses orthotics daily. No history of trauma. Patient believes his orthotics are no longer working.   He is requesting new orthotics     No Known Allergies        Current Outpatient Medications:     cholecalciferol (VITAMIN D3) 1,000 units tablet, Take 1,000 Units by mouth daily, Disp: , Rfl:     Cholecalciferol (VITAMIN D3) 44046 units CAPS, Take by mouth once a week, Disp: , Rfl:     diclofenac sodium (VOLTAREN) 1 %, Apply 4 g topically 4 (four) times a day (Patient taking differently: Apply 4 g topically 4 (four) times a day ), Disp: 4 Tube, Rfl: 1    doxazosin (CARDURA) 4 mg tablet, Take 4 mg by mouth daily at bedtime, Disp: , Rfl:     meloxicam (MOBIC) 7.5 mg tablet, Take 1 tablet (7.5 mg total) by mouth daily for 10 days (Patient not taking: Reported on 11/20/2019), Disp: 10 tablet, Rfl: 0    tamsulosin (FLOMAX) 0.4 mg, Take 0.4 mg by mouth daily with dinner, Disp: , Rfl:            Patient Active Problem List   Diagnosis    Plantar fasciitis    Pain in both feet    Congenital pes planus of right foot    Congenital pes planus of left foot    Tinea pedis of both feet    Dark stools             Patient ID: Leyda Farris is a 61 y.o. male. HPI     The following portions of the patient's history were reviewed and updated as appropriate:      family history includes Cancer in his father; Colon cancer in his maternal aunt; Heart disease in his mother; Hypertension in his father; Stomach cancer in his father. reports that he has never smoked. He has never used smokeless tobacco. He reports that he drinks alcohol. He reports that he does not use drugs. Objective:  Patient's shoes and socks removed. Foot ExamPhysical Exam          Pulses palpable bilateral PT DP and PT pulses 2/4 bilateral  Significant pes planus bilateral, there is decreased  of the arch on weight-bearing  Right 2nd digit there is medial deviation of the PIPJ mild pain on palpation of 2nd MPJ mild swelling  Severe hallux limitus bilateral left worse than right less than 15° of dorsiflexion 1st ray  No hypermobility of the 1st ray  Muscle strength 5/5 all groups bilateral feet and ankles  Moderate equinus bilateral 5° dorsiflexion knee flexed and extended  Negative edema negative erythema no signs of infection  Nails are thickened discolored dystrophic mild onychomycosis  Ingrown nail both borders bilateral hallux     Ingrown right hallux medial border. Mild hyperkeratotic lesion on the medial border but no sign of infection  Mild pain on palpation plantar medial heel bilateral  Some rigid hammertoes 2nd digit bilateral     Right hallux demonstrates dried subungual hematoma.   No evidence of lysis or cellulitis

## 2023-12-06 DIAGNOSIS — M54.16 LUMBAR RADICULOPATHY: ICD-10-CM

## 2023-12-06 DIAGNOSIS — M48.061 LUMBAR FORAMINAL STENOSIS: ICD-10-CM

## 2023-12-06 RX ORDER — DULOXETIN HYDROCHLORIDE 60 MG/1
60 CAPSULE, DELAYED RELEASE ORAL DAILY
Qty: 30 CAPSULE | Refills: 1 | Status: SHIPPED | OUTPATIENT
Start: 2023-12-06

## 2024-01-05 ENCOUNTER — TELEPHONE (OUTPATIENT)
Age: 59
End: 2024-01-05

## 2024-01-05 NOTE — TELEPHONE ENCOUNTER
r/s from 1/9/24, not able to leave a v/m so I sent a My Chart message to the pt and asked for pt nto confirm new appt info or to r/s if needed

## 2024-01-30 ENCOUNTER — OFFICE VISIT (OUTPATIENT)
Age: 59
End: 2024-01-30
Payer: COMMERCIAL

## 2024-01-30 VITALS
HEART RATE: 74 BPM | SYSTOLIC BLOOD PRESSURE: 121 MMHG | WEIGHT: 226.2 LBS | BODY MASS INDEX: 31.67 KG/M2 | DIASTOLIC BLOOD PRESSURE: 81 MMHG | HEIGHT: 71 IN | RESPIRATION RATE: 17 BRPM

## 2024-01-30 DIAGNOSIS — M21.961 ACQUIRED DEFORMITY OF RIGHT FOOT: ICD-10-CM

## 2024-01-30 DIAGNOSIS — M79.671 PAIN IN BOTH FEET: Primary | ICD-10-CM

## 2024-01-30 DIAGNOSIS — M21.41 ACQUIRED FLAT FOOT, RIGHT: ICD-10-CM

## 2024-01-30 DIAGNOSIS — M21.962 ACQUIRED DEFORMITY OF LEFT FOOT: ICD-10-CM

## 2024-01-30 DIAGNOSIS — M21.42 ACQUIRED FLAT FOOT, LEFT: ICD-10-CM

## 2024-01-30 DIAGNOSIS — M20.41 HAMMER TOES OF BOTH FEET: ICD-10-CM

## 2024-01-30 DIAGNOSIS — M20.42 HAMMER TOES OF BOTH FEET: ICD-10-CM

## 2024-01-30 DIAGNOSIS — M79.672 PAIN IN BOTH FEET: Primary | ICD-10-CM

## 2024-01-30 PROCEDURE — 99213 OFFICE O/P EST LOW 20 MIN: CPT | Performed by: PODIATRIST

## 2024-01-30 PROCEDURE — 3079F DIAST BP 80-89 MM HG: CPT | Performed by: PODIATRIST

## 2024-01-30 PROCEDURE — 3074F SYST BP LT 130 MM HG: CPT | Performed by: PODIATRIST

## 2024-01-30 NOTE — PROGRESS NOTES
Assessment/Plan:  Pain upon ambulation.  Hammertoe formation.  Pes planus.  Need for orthotics.  Ingrown toenail.  Paronychia.  Acquired deformity foot bilateral.  Acquired pes planus bilateral.  History of contusion right big toe     Plan.  Chart reviewed.  Notes reviewed.  Foot exam performed.  Patient educated on condition.  Patient will use orthotics daily.   He will stretch daily.  Patient advised on proper shoe style and sizing.  Patient will use orthotics as directed.  Pedal hygiene care advised upon.  Patient advised on aftercare.  Return for follow-up            Diagnoses and all orders for this visit:     Pain in both feet     Onychomycosis     Hammer toes of both feet     Ingrown toenail     Hammer toe of right foot     Acquired deformity foot bilateral        Acquired pes planus.            Subjective:  Patient has pain upon ambulation.  He has pain in his toes with ambulation.  He has painful hammertoes.  He uses orthotics daily.  No history of trauma.  Patient believes his orthotics are no longer working.  He is requesting new orthotics     No Known Allergies        Current Outpatient Medications:     cholecalciferol (VITAMIN D3) 1,000 units tablet, Take 1,000 Units by mouth daily, Disp: , Rfl:     Cholecalciferol (VITAMIN D3) 64408 units CAPS, Take by mouth once a week, Disp: , Rfl:     diclofenac sodium (VOLTAREN) 1 %, Apply 4 g topically 4 (four) times a day (Patient taking differently: Apply 4 g topically 4 (four) times a day ), Disp: 4 Tube, Rfl: 1    doxazosin (CARDURA) 4 mg tablet, Take 4 mg by mouth daily at bedtime, Disp: , Rfl:     meloxicam (MOBIC) 7.5 mg tablet, Take 1 tablet (7.5 mg total) by mouth daily for 10 days (Patient not taking: Reported on 11/20/2019), Disp: 10 tablet, Rfl: 0    tamsulosin (FLOMAX) 0.4 mg, Take 0.4 mg by mouth daily with dinner, Disp: , Rfl:            Patient Active Problem List   Diagnosis    Plantar fasciitis    Pain in both feet    Congenital pes planus of  right foot    Congenital pes planus of left foot    Tinea pedis of both feet    Dark stools             Patient ID: Jose Olson is a 57 y.o. male.     HPI     The following portions of the patient's history were reviewed and updated as appropriate:      family history includes Cancer in his father; Colon cancer in his maternal aunt; Heart disease in his mother; Hypertension in his father; Stomach cancer in his father.       reports that he has never smoked. He has never used smokeless tobacco. He reports that he drinks alcohol. He reports that he does not use drugs.      Objective:  Patient's shoes and socks removed.   Foot ExamPhysical Exam          Pulses palpable bilateral PT DP and PT pulses 2/4 bilateral  Significant pes planus bilateral, there is decreased  of the arch on weight-bearing  Right 2nd digit there is medial deviation of the PIPJ mild pain on palpation of 2nd MPJ mild swelling  Severe hallux limitus bilateral left worse than right less than 15° of dorsiflexion 1st ray  No hypermobility of the 1st ray  Muscle strength 5/5 all groups bilateral feet and ankles  Moderate equinus bilateral 5° dorsiflexion knee flexed and extended  Negative edema negative erythema no signs of infection  Nails are thickened discolored dystrophic mild onychomycosis  Ingrown nail both borders bilateral hallux     Ingrown right hallux medial border.  Mild hyperkeratotic lesion on the medial border but no sign of infection  Mild pain on palpation plantar medial heel bilateral  Some rigid hammertoes 2nd digit bilateral     Right hallux demonstrates dried subungual hematoma.  No evidence of lysis or cellulitis

## 2024-01-31 DIAGNOSIS — E78.5 DYSLIPIDEMIA: ICD-10-CM

## 2024-02-01 RX ORDER — ATORVASTATIN CALCIUM 10 MG/1
TABLET, FILM COATED ORAL
Qty: 30 TABLET | Refills: 5 | Status: SHIPPED | OUTPATIENT
Start: 2024-02-01

## 2024-02-05 DIAGNOSIS — M48.061 LUMBAR FORAMINAL STENOSIS: ICD-10-CM

## 2024-02-05 DIAGNOSIS — M54.16 LUMBAR RADICULOPATHY: ICD-10-CM

## 2024-02-05 RX ORDER — DULOXETIN HYDROCHLORIDE 60 MG/1
60 CAPSULE, DELAYED RELEASE ORAL DAILY
Qty: 30 CAPSULE | Refills: 1 | Status: SHIPPED | OUTPATIENT
Start: 2024-02-05

## 2024-04-03 DIAGNOSIS — M54.16 LUMBAR RADICULOPATHY: ICD-10-CM

## 2024-04-03 DIAGNOSIS — M48.061 LUMBAR FORAMINAL STENOSIS: ICD-10-CM

## 2024-04-03 RX ORDER — DULOXETIN HYDROCHLORIDE 60 MG/1
60 CAPSULE, DELAYED RELEASE ORAL DAILY
Qty: 30 CAPSULE | Refills: 1 | Status: SHIPPED | OUTPATIENT
Start: 2024-04-03

## 2024-04-09 ENCOUNTER — OFFICE VISIT (OUTPATIENT)
Age: 59
End: 2024-04-09
Payer: COMMERCIAL

## 2024-04-09 VITALS
WEIGHT: 226.13 LBS | RESPIRATION RATE: 17 BRPM | BODY MASS INDEX: 31.66 KG/M2 | HEART RATE: 97 BPM | DIASTOLIC BLOOD PRESSURE: 70 MMHG | HEIGHT: 71 IN | SYSTOLIC BLOOD PRESSURE: 106 MMHG

## 2024-04-09 DIAGNOSIS — M20.41 HAMMER TOES OF BOTH FEET: ICD-10-CM

## 2024-04-09 DIAGNOSIS — S90.211D CONTUSION OF RIGHT GREAT TOE WITH DAMAGE TO NAIL, SUBSEQUENT ENCOUNTER: ICD-10-CM

## 2024-04-09 DIAGNOSIS — M21.962 ACQUIRED DEFORMITY OF LEFT FOOT: ICD-10-CM

## 2024-04-09 DIAGNOSIS — M79.671 PAIN IN BOTH FEET: Primary | ICD-10-CM

## 2024-04-09 DIAGNOSIS — B35.3 TINEA PEDIS OF BOTH FEET: ICD-10-CM

## 2024-04-09 DIAGNOSIS — M20.42 HAMMER TOES OF BOTH FEET: ICD-10-CM

## 2024-04-09 DIAGNOSIS — M79.672 PAIN IN BOTH FEET: Primary | ICD-10-CM

## 2024-04-09 DIAGNOSIS — M21.961 ACQUIRED DEFORMITY OF RIGHT FOOT: ICD-10-CM

## 2024-04-09 PROCEDURE — 99213 OFFICE O/P EST LOW 20 MIN: CPT | Performed by: PODIATRIST

## 2024-05-06 DIAGNOSIS — M54.16 LUMBAR RADICULOPATHY: ICD-10-CM

## 2024-05-06 DIAGNOSIS — M48.061 LUMBAR FORAMINAL STENOSIS: ICD-10-CM

## 2024-05-06 RX ORDER — DULOXETIN HYDROCHLORIDE 60 MG/1
60 CAPSULE, DELAYED RELEASE ORAL DAILY
Qty: 30 CAPSULE | Refills: 1 | Status: SHIPPED | OUTPATIENT
Start: 2024-05-06

## 2024-05-29 DIAGNOSIS — M54.16 LUMBAR RADICULOPATHY: ICD-10-CM

## 2024-05-29 DIAGNOSIS — M48.061 LUMBAR FORAMINAL STENOSIS: ICD-10-CM

## 2024-05-30 RX ORDER — DULOXETIN HYDROCHLORIDE 60 MG/1
60 CAPSULE, DELAYED RELEASE ORAL DAILY
Qty: 30 CAPSULE | Refills: 1 | Status: SHIPPED | OUTPATIENT
Start: 2024-05-30

## 2024-07-02 ENCOUNTER — OFFICE VISIT (OUTPATIENT)
Age: 59
End: 2024-07-02
Payer: COMMERCIAL

## 2024-07-02 VITALS
HEIGHT: 71 IN | HEART RATE: 86 BPM | BODY MASS INDEX: 30.24 KG/M2 | RESPIRATION RATE: 17 BRPM | DIASTOLIC BLOOD PRESSURE: 81 MMHG | SYSTOLIC BLOOD PRESSURE: 130 MMHG | WEIGHT: 216 LBS

## 2024-07-02 DIAGNOSIS — M54.16 LUMBAR RADICULOPATHY: ICD-10-CM

## 2024-07-02 DIAGNOSIS — M21.962 ACQUIRED DEFORMITY OF LEFT FOOT: ICD-10-CM

## 2024-07-02 DIAGNOSIS — M21.961 ACQUIRED DEFORMITY OF RIGHT FOOT: ICD-10-CM

## 2024-07-02 DIAGNOSIS — M48.061 LUMBAR FORAMINAL STENOSIS: ICD-10-CM

## 2024-07-02 DIAGNOSIS — E78.5 DYSLIPIDEMIA: ICD-10-CM

## 2024-07-02 DIAGNOSIS — B35.1 ONYCHOMYCOSIS: ICD-10-CM

## 2024-07-02 DIAGNOSIS — M20.41 HAMMER TOES OF BOTH FEET: ICD-10-CM

## 2024-07-02 DIAGNOSIS — M79.672 PAIN IN BOTH FEET: Primary | ICD-10-CM

## 2024-07-02 DIAGNOSIS — M79.671 PAIN IN BOTH FEET: Primary | ICD-10-CM

## 2024-07-02 DIAGNOSIS — L60.0 INGROWN TOENAIL: ICD-10-CM

## 2024-07-02 DIAGNOSIS — M20.42 HAMMER TOES OF BOTH FEET: ICD-10-CM

## 2024-07-02 PROCEDURE — 99213 OFFICE O/P EST LOW 20 MIN: CPT | Performed by: PODIATRIST

## 2024-07-02 RX ORDER — ATORVASTATIN CALCIUM 10 MG/1
TABLET, FILM COATED ORAL
Qty: 30 TABLET | Refills: 5 | Status: SHIPPED | OUTPATIENT
Start: 2024-07-02

## 2024-07-02 RX ORDER — DULOXETIN HYDROCHLORIDE 60 MG/1
60 CAPSULE, DELAYED RELEASE ORAL DAILY
Qty: 30 CAPSULE | Refills: 1 | Status: SHIPPED | OUTPATIENT
Start: 2024-07-02

## 2024-07-02 NOTE — PROGRESS NOTES
Assessment/Plan:  Pain upon ambulation.  Hammertoe formation.  Pes planus.  Need for orthotics.  Ingrown toenail.  Paronychia.  Acquired deformity foot bilateral.  Acquired pes planus bilateral.  History of contusion right big toe     Plan.  Chart reviewed.  Notes reviewed.  Foot exam performed.  Patient educated on condition.  Patient will use orthotics daily.   He will stretch daily.  Patient advised on proper shoe style and sizing.  Patient will use orthotics as directed.  Pedal hygiene care advised upon.  Patient advised on aftercare.  Return for follow-up            Diagnoses and all orders for this visit:     Pain in both feet     Onychomycosis     Hammer toes of both feet     Ingrown toenail     Hammer toe of right foot     Acquired deformity foot bilateral        Acquired pes planus.            Subjective:  Patient has pain upon ambulation.  He has pain in his toes with ambulation.  He has painful hammertoes.  He uses orthotics daily.  No history of trauma.  Patient believes his orthotics are no longer working.  He is requesting new orthotics     No Known Allergies        Current Outpatient Medications:     cholecalciferol (VITAMIN D3) 1,000 units tablet, Take 1,000 Units by mouth daily, Disp: , Rfl:     Cholecalciferol (VITAMIN D3) 18655 units CAPS, Take by mouth once a week, Disp: , Rfl:     diclofenac sodium (VOLTAREN) 1 %, Apply 4 g topically 4 (four) times a day (Patient taking differently: Apply 4 g topically 4 (four) times a day ), Disp: 4 Tube, Rfl: 1    doxazosin (CARDURA) 4 mg tablet, Take 4 mg by mouth daily at bedtime, Disp: , Rfl:     meloxicam (MOBIC) 7.5 mg tablet, Take 1 tablet (7.5 mg total) by mouth daily for 10 days (Patient not taking: Reported on 11/20/2019), Disp: 10 tablet, Rfl: 0    tamsulosin (FLOMAX) 0.4 mg, Take 0.4 mg by mouth daily with dinner, Disp: , Rfl:            Patient Active Problem List   Diagnosis    Plantar fasciitis    Pain in both feet    Congenital pes planus of  right foot    Congenital pes planus of left foot    Tinea pedis of both feet    Dark stools             Patient ID: Jose Olson is a 58 y.o. male.     HPI     The following portions of the patient's history were reviewed and updated as appropriate:      family history includes Cancer in his father; Colon cancer in his maternal aunt; Heart disease in his mother; Hypertension in his father; Stomach cancer in his father.       reports that he has never smoked. He has never used smokeless tobacco. He reports that he drinks alcohol. He reports that he does not use drugs.      Objective:  Patient's shoes and socks removed.   Foot ExamPhysical Exam          Pulses palpable bilateral PT DP and PT pulses 2/4 bilateral  Significant pes planus bilateral, there is decreased  of the arch on weight-bearing  Right 2nd digit there is medial deviation of the PIPJ mild pain on palpation of 2nd MPJ mild swelling  Severe hallux limitus bilateral left worse than right less than 15° of dorsiflexion 1st ray  No hypermobility of the 1st ray  Muscle strength 5/5 all groups bilateral feet and ankles  Moderate equinus bilateral 5° dorsiflexion knee flexed and extended  Negative edema negative erythema no signs of infection  Nails are thickened discolored dystrophic mild onychomycosis  Ingrown nail both borders bilateral hallux     Ingrown right hallux medial border.  Mild hyperkeratotic lesion on the medial border but no sign of infection  Mild pain on palpation plantar medial heel bilateral  Some rigid hammertoes 2nd digit bilateral     Right hallux demonstrates dried subungual hematoma.  No evidence of lysis or cellulitis

## 2024-07-09 NOTE — PROGRESS NOTES
Office Visit Note  07/10/24     Jose Olson 58 y.o. male MRN: [id number]  : 1965    Assessment:     1. Dyslipidemia  Assessment & Plan:  Patient is currently taking atorvastatin 10 mg at bedtime and follow-up with repeat lipid profile  Orders:  -     Lipid panel; Future  -     Lipid panel  -     atorvastatin (LIPITOR) 10 mg tablet; Take 1 tablet (10 mg total) by mouth daily at bedtime  2. Benign prostatic hyperplasia without lower urinary tract symptoms  Assessment & Plan:  Patient is being followed by the urologist currently taking Cardura and Flomax we will continue with the same last PSA levels came back at 2.2 in the month of .  3. Class 2 obesity due to excess calories without serious comorbidity with body mass index (BMI) of 36.0 to 36.9 in adult  Assessment & Plan:  Patient has lost significant weight about 57 pounds in last 10 months time his BMI came down from 36.93 now it is 30.05 recommend continue to follow strict diet exercise lose weight  4. Primary hypertension  Assessment & Plan:  Blood pressure stable 118/72  5. Gynecomastia  Assessment & Plan:  Concerned with gynecomastia we will check the testosterone levels meanwhile recommend to continue exercise strengthen the muscles of the chest  6. Screening for thyroid disorder  -     TSH, 3rd generation with Free T4 reflex; Future; Expected date: 07/10/2024  -     TSH, 3rd generation with Free T4 reflex  7. Screening for deficiency anemia  -     CBC and differential; Future  -     CBC and differential  8. Pre-diabetes  -     Comprehensive metabolic panel; Future  -     Hemoglobin A1C; Future; Expected date: 07/10/2024  -     UA w Reflex to Microscopic w Reflex to Culture; Future; Expected date: 07/10/2024  -     Comprehensive metabolic panel  -     Hemoglobin A1C  9. Hypogonadism in male  -     Testosterone, free, total; Future  -     FSH and LH; Future  -     Testosterone, free, total  -     FSH and LH  10. Vitamin D deficiency  -      Vitamin D 25 hydroxy; Future  -     Vitamin D 25 hydroxy             Discussion Summary and Plan:  Today's care plan and medications were reviewed with patient in detail and all their questions answered to their satisfaction.    Chief Complaint   Patient presents with    Follow-up      Subjective:  Patient is coming here for a follow-up evaluation with regards to hypercholesterolemia, BPH, obesity.  Patient had lost significant weight in the last 10 months time about 47 pounds.  Patient has been cutting back on calorie intake and doing more exercise.  However he is concerned about the gynecomastia.  Denies any symptoms of chest pain palpitation shortness of breath.  Medications reviewed.  Patient is being followed by the urologist with regards to fluctuating PSA levels.        The following portions of the patient's history were reviewed and updated as appropriate: allergies, current medications, past family history, past medical history, past social history, past surgical history and problem list.    Review of Systems   Constitutional:  Negative for chills and fever.   HENT:  Negative for ear pain and sore throat.    Eyes:  Negative for pain and visual disturbance.   Respiratory:  Negative for cough and shortness of breath.    Cardiovascular:  Negative for chest pain and palpitations.   Gastrointestinal:  Negative for abdominal pain and vomiting.   Genitourinary:  Negative for dysuria and hematuria.   Musculoskeletal:  Negative for arthralgias and back pain.   Skin:  Negative for color change and rash.   Neurological:  Negative for seizures and syncope.   All other systems reviewed and are negative.        Historical Information   Patient Active Problem List   Diagnosis    Plantar fasciitis    Pain in both feet    Congenital pes planus of right foot    Congenital pes planus of left foot    Tinea pedis of both feet    Grade II hemorrhoids    HTN (hypertension)    Dyslipidemia    Benign prostatic hyperplasia without  lower urinary tract symptoms    Class 2 obesity in adult    URI (upper respiratory infection)    Lumbar radiculopathy    Shortness of breath    Gynecomastia     Past Medical History:   Diagnosis Date    BPH (benign prostatic hyperplasia)     Hypertension      Past Surgical History:   Procedure Laterality Date    COLONOSCOPY N/A 10/18/2017    Procedure: COLONOSCOPY;  Surgeon: Gia Borrego MD;  Location: [facility];  Service: Gastroenterology    EPIDURAL BLOCK INJECTION Left 09/16/2021    Procedure: L3 L4 transforaminal epidural steroid injection ( [id number]);  Surgeon: Lu Seo MD;  Location: [facility] MAIN OR;  Service: Pain Management     EPIDURAL BLOCK INJECTION N/A 07/12/2023    Procedure: L3 L4  LUMBAR epidural steroid injection ([id number]);  Surgeon: Daniel Viveros DO;  Location: [facility] MAIN OR;  Service: Pain Management     EPIDURAL BLOCK INJECTION N/A 11/15/2023    Procedure: L4-L5  LUMBAR epidural steroid injection ([id number]);  Surgeon: Daniel Viveros DO;  Location: [facility] MA[facility] OR;  Service: Pain Management     HEMORRHOID SURGERY      LUMBAR EPIDURAL INJECTION N/A 04/19/2023    Procedure: L3 L4 LUMBAR epidural steroid injection [id number];  Surgeon: Daniel Viveros DO;  Location: [facility] MAIN OR;  Service: Pain Management     SKIN BIOPSY       Social History     Substance and Sexual Activity   Alcohol Use Not Currently     Social History     Substance and Sexual Activity   Drug Use No     Social History     Tobacco Use   Smoking Status Never   Smokeless Tobacco Never     Family History   Problem Relation Age of Onset    Heart disease Mother     Cancer Father         stomach    Hypertension Father     Stomach cancer Father     Skin cancer Father     Colon cancer Maternal Aunt      Health Maintenance Due   Topic    Hepatitis C Screening     HIV Screening     Annual Physical     DTaP,Tdap,and Td Vaccines (1 - Tdap)    Zoster Vaccine (1 of 2)    COVID-19 Vaccine (3 - 2023-24 season)    Depression  "Screening     Influenza Vaccine (1)      Meds/Allergies       Current Outpatient Medications:     atorvastatin (LIPITOR) 10 mg tablet, Take 1 tablet (10 mg total) by mouth daily at bedtime, Disp: 30 tablet, Rfl: 5    cholecalciferol (VITAMIN D3) 1,000 units tablet, Take 1,000 Units by mouth daily, Disp: , Rfl:     doxazosin (CARDURA) 4 mg tablet, Take 4 mg by mouth daily at bedtime, Disp: , Rfl:     DULoxetine (CYMBALTA) 60 mg delayed release capsule, TAKE 1 CAPSULE DAILY, Disp: 30 capsule, Rfl: 1    tamsulosin (FLOMAX) 0.4 mg, Take 0.4 mg by mouth daily with dinner, Disp: , Rfl:       Objective:    Vitals:   /72 (BP Location: Right arm, Patient Position: Sitting, Cuff Size: Standard)   Pulse 84   Ht 5' 10\" (1.778 m)   Wt 95 kg (209 lb 6.4 oz)   SpO2 97%   BMI 30.05 kg/m²   Body mass index is 30.05 kg/m².  Vitals:    07/10/24 0900   Weight: 95 kg (209 lb 6.4 oz)       Physical Exam  Vitals and nursing note reviewed.   Constitutional:       Appearance: Normal appearance.   Cardiovascular:      Rate and Rhythm: Normal rate and regular rhythm.      Heart sounds: Normal heart sounds.   Pulmonary:      Effort: Pulmonary effort is normal.      Breath sounds: Normal breath sounds.   Abdominal:      Palpations: Abdomen is soft.   Musculoskeletal:         General: Normal range of motion.      Right lower leg: No edema.      Left lower leg: No edema.   Skin:     General: Skin is warm.   Neurological:      Mental Status: He is alert and oriented to person, place, and time.   Psychiatric:         Mood and Affect: Mood normal.         Behavior: Behavior normal.         Lab Review   No visits with results within 2 Month(s) from this visit.   Latest known visit with results is:   Office Visit on 05/10/2023   Component Date Value Ref Range Status    White Blood Cell Count 06/05/2023 6.0  3.4 - 10.8 x10E3/uL Final    Red Blood Cell Count 06/05/2023 5.02  4.14 - 5.80 x10E6/uL Final    Hemoglobin 06/05/2023 15.0  13.0 - " 17.7 g/dL Final    HCT 06/05/2023 43.0  37.5 - 51.0 % Final    MCV 06/05/2023 86  79 - 97 fL Final    MCH 06/05/2023 29.9  26.6 - 33.0 pg Final    MCHC 06/05/2023 34.9  31.5 - 35.7 g/dL Final    RDW 06/05/2023 13.3  11.6 - 15.4 % Final    Platelet Count 06/05/2023 153  150 - 450 x10E3/uL Final    Neutrophils 06/05/2023 53  Not Estab. % Final    Lymphocytes 06/05/2023 35  Not Estab. % Final    Monocytes 06/05/2023 7  Not Estab. % Final    Eosinophils 06/05/2023 4  Not Estab. % Final    Basophils PCT 06/05/2023 1  Not Estab. % Final    Neutrophils (Absolute) 06/05/2023 3.2  1.4 - 7.0 x10E3/uL Final    Lymphocytes (Absolute) 06/05/2023 2.1  0.7 - 3.1 x10E3/uL Final    Monocytes (Absolute) 06/05/2023 0.4  0.1 - 0.9 x10E3/uL Final    Eosinophils (Absolute) 06/05/2023 0.2  0.0 - 0.4 x10E3/uL Final    Basophils ABS 06/05/2023 0.1  0.0 - 0.2 x10E3/uL Final    Immature Granulocytes 06/05/2023 0  Not Estab. % Final    Immature Granulocytes (Absolute) 06/05/2023 0.0  0.0 - 0.1 x10E3/uL Final    Glucose, Random 06/05/2023 90  70 - 99 mg/dL Final    BUN 06/05/2023 13  6 - 24 mg/dL Final    Creatinine 06/05/2023 1.08  0.76 - 1.27 mg/dL Final    eGFR 06/05/2023 80  >59 mL/min/1.73 Final    SL AMB BUN/CREATININE RATIO 06/05/2023 12  9 - 20 Final    Sodium 06/05/2023 142  134 - 144 mmol/L Final    Potassium 06/05/2023 3.8  3.5 - 5.2 mmol/L Final    Chloride 06/05/2023 106  96 - 106 mmol/L Final    CO2 06/05/2023 21  20 - 29 mmol/L Final    CALCIUM 06/05/2023 9.0  8.7 - 10.2 mg/dL Final    Protein, Total 06/05/2023 6.7  6.0 - 8.5 g/dL Final    Albumin 06/05/2023 4.5  3.8 - 4.9 g/dL Final    Globulin, Total 06/05/2023 2.2  1.5 - 4.5 g/dL Final    Albumin/Globulin Ratio 06/05/2023 2.0  1.2 - 2.2 Final    TOTAL BILIRUBIN 06/05/2023 0.7  0.0 - 1.2 mg/dL Final    Alk Phos Isoenzymes 06/05/2023 92  44 - 121 IU/L Final    AST 06/05/2023 12  0 - 40 IU/L Final    ALT 06/05/2023 19  0 - 44 IU/L Final    Hemoglobin A1C 06/05/2023 5.3  4.8 - 5.6  "% Final    Comment:          Prediabetes: 5.7 - 6.4           Diabetes: >6.4           Glycemic control for adults with diabetes: <7.0      Estimated Average Glucose 06/05/2023 105  mg/dL Final    Cholesterol, Total 06/05/2023 131  100 - 199 mg/dL Final    Triglycerides 06/05/2023 74  0 - 149 mg/dL Final    HDL 06/05/2023 37 (L)  >39 mg/dL Final    VLDL Cholesterol Calculated 06/05/2023 15  5 - 40 mg/dL Final    LDL Calculated 06/05/2023 79  0 - 99 mg/dL Final    T. Chol/HDL Ratio 06/05/2023 3.5  0.0 - 5.0 ratio Final    Comment:                                   T. Chol/HDL Ratio                                              Men  Women                                1/2 Avg.Risk  3.4    3.3                                    Avg.Risk  5.0    4.4                                 2X Avg.Risk  9.6    7.1                                 3X Avg.Risk 23.4   11.0      TSH 06/05/2023 1.880  0.450 - 4.500 uIU/mL Final         Andreas Jones MD        \"This note has been constructed using a voice recognition system.Therefore there may be syntax, spelling, and/or grammatical errors. Please call if you have any questions. \"  "

## 2024-07-10 ENCOUNTER — OFFICE VISIT (OUTPATIENT)
Dept: FAMILY MEDICINE CLINIC | Facility: CLINIC | Age: 59
End: 2024-07-10
Payer: COMMERCIAL

## 2024-07-10 VITALS
HEIGHT: 70 IN | DIASTOLIC BLOOD PRESSURE: 72 MMHG | BODY MASS INDEX: 29.98 KG/M2 | HEART RATE: 84 BPM | SYSTOLIC BLOOD PRESSURE: 118 MMHG | OXYGEN SATURATION: 97 % | WEIGHT: 209.4 LBS

## 2024-07-10 DIAGNOSIS — N62 GYNECOMASTIA: ICD-10-CM

## 2024-07-10 DIAGNOSIS — I10 PRIMARY HYPERTENSION: ICD-10-CM

## 2024-07-10 DIAGNOSIS — Z13.0 SCREENING FOR DEFICIENCY ANEMIA: ICD-10-CM

## 2024-07-10 DIAGNOSIS — N40.0 BENIGN PROSTATIC HYPERPLASIA WITHOUT LOWER URINARY TRACT SYMPTOMS: ICD-10-CM

## 2024-07-10 DIAGNOSIS — Z13.29 SCREENING FOR THYROID DISORDER: ICD-10-CM

## 2024-07-10 DIAGNOSIS — E78.5 DYSLIPIDEMIA: Primary | ICD-10-CM

## 2024-07-10 DIAGNOSIS — E66.09 CLASS 2 OBESITY DUE TO EXCESS CALORIES WITHOUT SERIOUS COMORBIDITY WITH BODY MASS INDEX (BMI) OF 36.0 TO 36.9 IN ADULT: ICD-10-CM

## 2024-07-10 DIAGNOSIS — E55.9 VITAMIN D DEFICIENCY: ICD-10-CM

## 2024-07-10 DIAGNOSIS — E29.1 HYPOGONADISM IN MALE: ICD-10-CM

## 2024-07-10 DIAGNOSIS — R73.03 PRE-DIABETES: ICD-10-CM

## 2024-07-10 PROCEDURE — 99214 OFFICE O/P EST MOD 30 MIN: CPT | Performed by: INTERNAL MEDICINE

## 2024-07-10 RX ORDER — ATORVASTATIN CALCIUM 10 MG/1
10 TABLET, FILM COATED ORAL
Qty: 30 TABLET | Refills: 5 | Status: SHIPPED | OUTPATIENT
Start: 2024-07-10

## 2024-07-10 NOTE — ASSESSMENT & PLAN NOTE
Patient is being followed by the urologist currently taking Cardura and Flomax we will continue with the same last PSA levels came back at 2.2 in the month of June.

## 2024-07-10 NOTE — ASSESSMENT & PLAN NOTE
Concerned with gynecomastia we will check the testosterone levels meanwhile recommend to continue exercise strengthen the muscles of the chest

## 2024-07-10 NOTE — ASSESSMENT & PLAN NOTE
Patient has lost significant weight about 57 pounds in last 10 months time his BMI came down from 36.93 now it is 30.05 recommend continue to follow strict diet exercise lose weight

## 2024-07-11 LAB
ALBUMIN SERPL-MCNC: 4.5 G/DL (ref 3.8–4.9)
ALP SERPL-CCNC: 92 IU/L (ref 44–121)
ALT SERPL-CCNC: 10 IU/L (ref 0–44)
AST SERPL-CCNC: 11 IU/L (ref 0–40)
BASOPHILS # BLD AUTO: 0.1 X10E3/UL (ref 0–0.2)
BASOPHILS NFR BLD AUTO: 1 %
BILIRUB SERPL-MCNC: 1 MG/DL (ref 0–1.2)
BUN SERPL-MCNC: 18 MG/DL (ref 6–24)
BUN/CREAT SERPL: 14 (ref 9–20)
CALCIUM SERPL-MCNC: 9.3 MG/DL (ref 8.7–10.2)
CHLORIDE SERPL-SCNC: 106 MMOL/L (ref 96–106)
CHOLEST SERPL-MCNC: 130 MG/DL (ref 100–199)
CHOLEST/HDLC SERPL: 3.6 RATIO (ref 0–5)
CO2 SERPL-SCNC: 23 MMOL/L (ref 20–29)
CREAT SERPL-MCNC: 1.32 MG/DL (ref 0.76–1.27)
EGFR: 62 ML/MIN/1.73
EOSINOPHIL # BLD AUTO: 0.2 X10E3/UL (ref 0–0.4)
EOSINOPHIL NFR BLD AUTO: 3 %
ERYTHROCYTE [DISTWIDTH] IN BLOOD BY AUTOMATED COUNT: 13.3 % (ref 11.6–15.4)
EST. AVERAGE GLUCOSE BLD GHB EST-MCNC: 105 MG/DL
FSH SERPL-ACNC: 12.2 MIU/ML (ref 1.5–12.4)
GLOBULIN SER-MCNC: 2.2 G/DL (ref 1.5–4.5)
GLUCOSE SERPL-MCNC: 90 MG/DL (ref 70–99)
HBA1C MFR BLD: 5.3 % (ref 4.8–5.6)
HCT VFR BLD AUTO: 41.5 % (ref 37.5–51)
HDLC SERPL-MCNC: 36 MG/DL
HGB BLD-MCNC: 14.1 G/DL (ref 13–17.7)
IMM GRANULOCYTES # BLD: 0 X10E3/UL (ref 0–0.1)
IMM GRANULOCYTES NFR BLD: 0 %
LDLC SERPL CALC-MCNC: 79 MG/DL (ref 0–99)
LH SERPL-ACNC: 6.1 MIU/ML (ref 1.7–8.6)
LYMPHOCYTES # BLD AUTO: 2.1 X10E3/UL (ref 0.7–3.1)
LYMPHOCYTES NFR BLD AUTO: 36 %
MCH RBC QN AUTO: 29.9 PG (ref 26.6–33)
MCHC RBC AUTO-ENTMCNC: 34 G/DL (ref 31.5–35.7)
MCV RBC AUTO: 88 FL (ref 79–97)
MONOCYTES # BLD AUTO: 0.5 X10E3/UL (ref 0.1–0.9)
MONOCYTES NFR BLD AUTO: 8 %
NEUTROPHILS # BLD AUTO: 3.1 X10E3/UL (ref 1.4–7)
NEUTROPHILS NFR BLD AUTO: 52 %
PLATELET # BLD AUTO: 163 X10E3/UL (ref 150–450)
POTASSIUM SERPL-SCNC: 3.8 MMOL/L (ref 3.5–5.2)
PROT SERPL-MCNC: 6.7 G/DL (ref 6–8.5)
RBC # BLD AUTO: 4.71 X10E6/UL (ref 4.14–5.8)
SL AMB VLDL CHOLESTEROL CALC: 15 MG/DL (ref 5–40)
SODIUM SERPL-SCNC: 143 MMOL/L (ref 134–144)
TRIGL SERPL-MCNC: 77 MG/DL (ref 0–149)
TSH SERPL DL<=0.005 MIU/L-ACNC: 2.64 UIU/ML (ref 0.45–4.5)
WBC # BLD AUTO: 6 X10E3/UL (ref 3.4–10.8)

## 2024-07-12 LAB
25(OH)D3+25(OH)D2 SERPL-MCNC: 29.7 NG/ML (ref 30–100)
TESTOST FREE SERPL-MCNC: 6.8 PG/ML (ref 7.2–24)
TESTOST SERPL-MCNC: 599 NG/DL (ref 264–916)

## 2024-07-16 ENCOUNTER — TELEPHONE (OUTPATIENT)
Dept: FAMILY MEDICINE CLINIC | Facility: CLINIC | Age: 59
End: 2024-07-16

## 2024-07-16 NOTE — TELEPHONE ENCOUNTER
----- Message from Andreas Jones MD sent at 7/14/2024 12:55 PM EDT -----  Inform patient I have reviewed the lab reports I will discuss with him on his next office visit meanwhile recommend patient to drink more fluids also since his creatinine level is coming up slightly high at follow-up with repeat lab at a later date when he comes to the office.  ----- Message -----  From: Jessica, [facility] Results In  Sent: 7/11/2024   8:12 AM EDT  To: Andreas Jones MD    07/16/2024  Spoke to pt inform him of doctors notation

## 2024-08-18 NOTE — TELEPHONE ENCOUNTER
I have tried to call the patient for a third time  Phone keeps ringing and there is no voicemail  I am unable to leave a message  PROVIDER:[TOKEN:[[id number]],FOLLOWUP:[4-6 Days]]

## 2024-08-28 ENCOUNTER — OFFICE VISIT (OUTPATIENT)
Dept: FAMILY MEDICINE CLINIC | Facility: CLINIC | Age: 59
End: 2024-08-28
Payer: COMMERCIAL

## 2024-08-28 VITALS
HEIGHT: 70 IN | OXYGEN SATURATION: 98 % | BODY MASS INDEX: 29.63 KG/M2 | DIASTOLIC BLOOD PRESSURE: 66 MMHG | SYSTOLIC BLOOD PRESSURE: 106 MMHG | HEART RATE: 80 BPM | WEIGHT: 207 LBS

## 2024-08-28 DIAGNOSIS — M54.16 LUMBAR RADICULOPATHY: ICD-10-CM

## 2024-08-28 DIAGNOSIS — N62 GYNECOMASTIA: ICD-10-CM

## 2024-08-28 DIAGNOSIS — E78.5 DYSLIPIDEMIA: ICD-10-CM

## 2024-08-28 DIAGNOSIS — N40.0 BENIGN PROSTATIC HYPERPLASIA WITHOUT LOWER URINARY TRACT SYMPTOMS: ICD-10-CM

## 2024-08-28 DIAGNOSIS — I10 PRIMARY HYPERTENSION: ICD-10-CM

## 2024-08-28 DIAGNOSIS — E66.3 OVERWEIGHT (BMI 25.0-29.9): ICD-10-CM

## 2024-08-28 DIAGNOSIS — R79.89 ELEVATED SERUM CREATININE: Primary | ICD-10-CM

## 2024-08-28 PROBLEM — E66.812 CLASS 2 OBESITY IN ADULT: Status: RESOLVED | Noted: 2022-10-18 | Resolved: 2024-08-28

## 2024-08-28 PROBLEM — E66.9 CLASS 2 OBESITY IN ADULT: Status: RESOLVED | Noted: 2022-10-18 | Resolved: 2024-08-28

## 2024-08-28 PROCEDURE — 99214 OFFICE O/P EST MOD 30 MIN: CPT | Performed by: INTERNAL MEDICINE

## 2024-08-28 NOTE — ASSESSMENT & PLAN NOTE
Lipid profile shows cholesterol 130 triglycerides 77 HDL 36 LDL at 79 currently patient is taking atorvastatin 10 mg at bedtime we will continue the same.

## 2024-08-28 NOTE — ASSESSMENT & PLAN NOTE
Patient serum creatinine came back at 1.3 with GFR of 62 patient is taking supplements for his prostate recommend to stop it for now will repeat the serum creatinine level again recommend patient to drink more fluids also.

## 2024-08-28 NOTE — ASSESSMENT & PLAN NOTE
Testosterone level came back normal free testosterone was slightly low we will get estradiol level hCG levels also

## 2024-08-28 NOTE — PROGRESS NOTES
Office Visit Note  24     Jose Olson 58 y.o. male MRN: [id number]  : 1965    Assessment:     1. Elevated serum creatinine  Assessment & Plan:  Patient serum creatinine came back at 1.3 with GFR of 62 patient is taking supplements for his prostate recommend to stop it for now will repeat the serum creatinine level again recommend patient to drink more fluids also.  Orders:  -     Basic metabolic panel; Future  -     Basic metabolic panel  2. Gynecomastia  Assessment & Plan:  Testosterone level came back normal free testosterone was slightly low we will get estradiol level hCG levels also  Orders:  -     Estradiol; Future  -     hCG, quantitative; Future  -     hCG, quantitative  3. Benign prostatic hyperplasia without lower urinary tract symptoms  Assessment & Plan:  Patient is being followed by the urologist regarding his BPH currently taking Cardura and Flomax we will continue with the same  4. Overweight (BMI 25.0-29.9)  Assessment & Plan:  Patient's weight is 29.70 he lost 3 more pounds in the recent past encouraged to continue to lose some weight lifestyle modification.  5. Dyslipidemia  Assessment & Plan:  Lipid profile shows cholesterol 130 triglycerides 77 HDL 36 LDL at 79 currently patient is taking atorvastatin 10 mg at bedtime we will continue the same.  6. Primary hypertension  Assessment & Plan:  Please blood pressure is 106/66  7. Lumbar radiculopathy  Assessment & Plan:  Patient with a history of lumbar radiculopathy for which she was seen in the past by the pain management physician spine physician who has given him steroid injections and subsequently was also placed on Cymbalta appears to be helping at this time we will continue the same.             Discussion Summary and Plan:  Today's care plan and medications were reviewed with patient in detail and all their questions answered to their satisfaction.    Chief Complaint   Patient presents with    Follow-up       Subjective:  Patient is coming here for a follow-up evaluation with regards to symptoms of his hypercholesterolemia he also concerned about the gynecomastia.  Patient with a history of BPH and also low back pain.  Medications reviewed labs reviewed testosterone level came back normal.  Testosterone was slightly low.  Creatinine was at 1.3.  Medication reviewed labs reviewed.        The following portions of the patient's history were reviewed and updated as appropriate: allergies, current medications, past family history, past medical history, past social history, past surgical history and problem list.    Review of Systems   Constitutional:  Negative for chills and fever.   HENT:  Negative for ear pain and sore throat.    Eyes:  Negative for pain and visual disturbance.   Respiratory:  Negative for cough and shortness of breath.    Cardiovascular:  Negative for chest pain and palpitations.   Gastrointestinal:  Negative for abdominal pain and vomiting.   Genitourinary:  Negative for dysuria and hematuria.   Musculoskeletal:  Positive for arthralgias. Negative for back pain.   Skin:  Negative for color change and rash.   Neurological:  Negative for seizures and syncope.   All other systems reviewed and are negative.        Historical Information   Patient Active Problem List   Diagnosis    Plantar fasciitis    Pain in both feet    Congenital pes planus of right foot    Congenital pes planus of left foot    Tinea pedis of both feet    Grade II hemorrhoids    HTN (hypertension)    Dyslipidemia    Benign prostatic hyperplasia without lower urinary tract symptoms    URI (upper respiratory infection)    Lumbar radiculopathy    Shortness of breath    Gynecomastia    Overweight (BMI 25.0-29.9)    Elevated serum creatinine     Past Medical History:   Diagnosis Date    BPH (benign prostatic hyperplasia)     Class 2 obesity in adult 10/18/2022    Hypertension      Past Surgical History:   Procedure Laterality Date     COLONOSCOPY N/A 10/18/2017    Procedure: COLONOSCOPY;  Surgeon: Gia Borrego MD;  Location: [facility];  Service: Gastroenterology    EPIDURAL BLOCK INJECTION Left 09/16/2021    Procedure: L3 L4 transforaminal epidural steroid injection ( [id number]);  Surgeon: Lu Seo MD;  Location: [facility] MAIN OR;  Service: Pain Management     EPIDURAL BLOCK INJECTION N/A 07/12/2023    Procedure: L3 L4  LUMBAR epidural steroid injection (53440);  Surgeon: Daniel Viveros DO;  Location: [facility] MAIN OR;  Service: Pain Management     EPIDURAL BLOCK INJECTION N/A 11/15/2023    Procedure: L4-L5  LUMBAR epidural steroid injection ([id number]);  Surgeon: Daniel Viveros DO;  Location: [facility] MAIN OR;  Service: Pain Management     HEMORRHOID SURGERY      LUMBAR EPIDURAL INJECTION N/A 04/19/2023    Procedure: L3 L4 LUMBAR epidural steroid injection [id number];  Surgeon: Daniel Viveros DO;  Location: [facility] MAIN OR;  Service: Pain Management     SKIN BIOPSY       Social History     Substance and Sexual Activity   Alcohol Use Not Currently     Social History     Substance and Sexual Activity   Drug Use No     Social History     Tobacco Use   Smoking Status Never    Passive exposure: Never   Smokeless Tobacco Never     Family History   Problem Relation Age of Onset    Heart disease Mother     Cancer Father         stomach    Hypertension Father     Stomach cancer Father     Skin cancer Father     Colon cancer Maternal Aunt      Health Maintenance Due   Topic    Hepatitis C Screening     HIV Screening     Annual Physical     DTaP,Tdap,and Td Vaccines (1 - Tdap)    Zoster Vaccine (1 of 2)    COVID-19 Vaccine (3 - 2023-24 season)    Depression Screening     Influenza Vaccine (1)      Meds/Allergies       Current Outpatient Medications:     atorvastatin (LIPITOR) 10 mg tablet, Take 1 tablet (10 mg total) by mouth daily at bedtime, Disp: 30 tablet, Rfl: 5    cholecalciferol (VITAMIN D3) 1,000 units tablet, Take 1,000 Units by mouth  "daily, Disp: , Rfl:     doxazosin (CARDURA) 4 mg tablet, Take 4 mg by mouth daily at bedtime, Disp: , Rfl:     DULoxetine (CYMBALTA) 60 mg delayed release capsule, TAKE 1 CAPSULE DAILY, Disp: 30 capsule, Rfl: 1    tamsulosin (FLOMAX) 0.4 mg, Take 0.4 mg by mouth daily with dinner, Disp: , Rfl:       Objective:    Vitals:   /66   Pulse 80   Ht 5' 10\" (1.778 m)   Wt 93.9 kg (207 lb)   SpO2 98%   BMI 29.70 kg/m²   Body mass index is 29.7 kg/m².  Vitals:    08/28/24 1409   Weight: 93.9 kg (207 lb)       Physical Exam  Vitals and nursing note reviewed.   Constitutional:       Appearance: Normal appearance.   Cardiovascular:      Rate and Rhythm: Normal rate and regular rhythm.      Heart sounds: Normal heart sounds.   Pulmonary:      Effort: Pulmonary effort is normal.      Breath sounds: Normal breath sounds.   Abdominal:      Palpations: Abdomen is soft.   Musculoskeletal:         General: No tenderness.      Right lower leg: No edema.      Left lower leg: No edema.      Comments: Lumbar spine movements causing some discomfort and pain   Skin:     General: Skin is warm and dry.   Neurological:      General: No focal deficit present.      Mental Status: He is alert and oriented to person, place, and time.   Psychiatric:         Mood and Affect: Mood normal.         Behavior: Behavior normal.         Lab Review   Orders Only on 07/11/2024   Component Date Value Ref Range Status    TESTOSTERONE TOTAL 07/11/2024 599  264 - 916 ng/dL Final    Comment: Adult male reference interval is based on a population of  healthy nonobese males (BMI <30) between 19 and 39 years old.  Demian et.al. JCEM 2017,102;7087-4361. PMID: 81531839.      Testosterone, Free 07/11/2024 6.8 (L)  7.2 - 24.0 pg/mL Final   Office Visit on 07/10/2024   Component Date Value Ref Range Status    White Blood Cell Count 07/10/2024 6.0  3.4 - 10.8 x10E3/uL Final    Red Blood Cell Count 07/10/2024 4.71  4.14 - 5.80 x10E6/uL Final    Hemoglobin " 07/10/2024 14.1  13.0 - 17.7 g/dL Final    HCT 07/10/2024 41.5  37.5 - 51.0 % Final    MCV 07/10/2024 88  79 - 97 fL Final    MCH 07/10/2024 29.9  26.6 - 33.0 pg Final    MCHC 07/10/2024 34.0  31.5 - 35.7 g/dL Final    RDW 07/10/2024 13.3  11.6 - 15.4 % Final    Platelet Count 07/10/2024 163  150 - 450 x10E3/uL Final    Neutrophils 07/10/2024 52  Not Estab. % Final    Lymphocytes 07/10/2024 36  Not Estab. % Final    Monocytes 07/10/2024 8  Not Estab. % Final    Eosinophils 07/10/2024 3  Not Estab. % Final    Basophils PCT 07/10/2024 1  Not Estab. % Final    Neutrophils (Absolute) 07/10/2024 3.1  1.4 - 7.0 x10E3/uL Final    Lymphocytes (Absolute) 07/10/2024 2.1  0.7 - 3.1 x10E3/uL Final    Monocytes (Absolute) 07/10/2024 0.5  0.1 - 0.9 x10E3/uL Final    Eosinophils (Absolute) 07/10/2024 0.2  0.0 - 0.4 x10E3/uL Final    Basophils ABS 07/10/2024 0.1  0.0 - 0.2 x10E3/uL Final    Immature Granulocytes 07/10/2024 0  Not Estab. % Final    Immature Granulocytes (Absolute) 07/10/2024 0.0  0.0 - 0.1 x10E3/uL Final    Glucose, Random 07/10/2024 90  70 - 99 mg/dL Final    BUN 07/10/2024 18  6 - 24 mg/dL Final    Creatinine 07/10/2024 1.32 (H)  0.76 - 1.27 mg/dL Final    eGFR 07/10/2024 62  >59 mL/min/1.73 Final    SL AMB BUN/CREATININE RATIO 07/10/2024 14  9 - 20 Final    Sodium 07/10/2024 143  134 - 144 mmol/L Final    Potassium 07/10/2024 3.8  3.5 - 5.2 mmol/L Final    Chloride 07/10/2024 106  96 - 106 mmol/L Final    CO2 07/10/2024 23  20 - 29 mmol/L Final    CALCIUM 07/10/2024 9.3  8.7 - 10.2 mg/dL Final    Protein, Total 07/10/2024 6.7  6.0 - 8.5 g/dL Final    Albumin 07/10/2024 4.5  3.8 - 4.9 g/dL Final    Globulin, Total 07/10/2024 2.2  1.5 - 4.5 g/dL Final    TOTAL BILIRUBIN 07/10/2024 1.0  0.0 - 1.2 mg/dL Final    Alk Phos Isoenzymes 07/10/2024 92  44 - 121 IU/L Final    AST 07/10/2024 11  0 - 40 IU/L Final    ALT 07/10/2024 10  0 - 44 IU/L Final    Hemoglobin A1C 07/10/2024 5.3  4.8 - 5.6 % Final    Comment:           "Prediabetes: 5.7 - 6.4           Diabetes: >6.4           Glycemic control for adults with diabetes: <7.0      Estimated Average Glucose 07/10/2024 105  mg/dL Final    Cholesterol, Total 07/10/2024 130  100 - 199 mg/dL Final    Triglycerides 07/10/2024 77  0 - 149 mg/dL Final    HDL 07/10/2024 36 (L)  >39 mg/dL Final    VLDL Cholesterol Calculated 07/10/2024 15  5 - 40 mg/dL Final    LDL Calculated 07/10/2024 79  0 - 99 mg/dL Final    T. Chol/HDL Ratio 07/10/2024 3.6  0.0 - 5.0 ratio Final    Comment:                                   T. Chol/HDL Ratio                                              Men  Women                                1/2 Avg.Risk  3.4    3.3                                    Avg.Risk  5.0    4.4                                 2X Avg.Risk  9.6    7.1                                 3X Avg.Risk 23.4   11.0      TSH 07/10/2024 2.640  0.450 - 4.500 uIU/mL Final    Luteinizing Hormone (LH) 07/10/2024 6.1  1.7 - 8.6 mIU/mL Final    FSH 07/10/2024 12.2  1.5 - 12.4 mIU/mL Final    25-HYDROXY VIT D 07/11/2024 29.7 (L)  30.0 - 100.0 ng/mL Final    Comment: Vitamin D deficiency has been defined by the [place] [employer] and an Endocrine Society practice guideline as a  level of serum 25-OH vitamin D less than 20 ng/mL (1,2).  The Endocrine Society went on to further define vitamin D  insufficiency as a level between 21 and 29 ng/mL (2).  1. IOM ([place] [employer]). 2010. Dietary reference     intakes for calcium and D. [place]: The     National Academies Press.  2. Roberto MF, Erik NC, Yadira-Mason MESSER, et al.     Evaluation, treatment, and prevention of vitamin D     deficiency: an Endocrine Society clinical practice     guideline. JCEM. 2011 Jul; 96(7):1911-30.           Andreas Jones MD        \"This note has been constructed using a voice recognition system.Therefore there may be syntax, spelling, and/or grammatical errors. Please call if you have any questions. \"  "

## 2024-08-28 NOTE — ASSESSMENT & PLAN NOTE
Patient with a history of lumbar radiculopathy for which she was seen in the past by the pain management physician spine physician who has given him steroid injections and subsequently was also placed on Cymbalta appears to be helping at this time we will continue the same.

## 2024-08-28 NOTE — ASSESSMENT & PLAN NOTE
Patient's weight is 29.70 he lost 3 more pounds in the recent past encouraged to continue to lose some weight lifestyle modification.

## 2024-08-28 NOTE — ASSESSMENT & PLAN NOTE
Patient is being followed by the urologist regarding his BPH currently taking Cardura and Flomax we will continue with the same

## 2024-09-10 ENCOUNTER — OFFICE VISIT (OUTPATIENT)
Age: 59
End: 2024-09-10
Payer: COMMERCIAL

## 2024-09-10 VITALS
RESPIRATION RATE: 16 BRPM | BODY MASS INDEX: 30.26 KG/M2 | SYSTOLIC BLOOD PRESSURE: 114 MMHG | HEART RATE: 87 BPM | WEIGHT: 211.4 LBS | DIASTOLIC BLOOD PRESSURE: 71 MMHG | HEIGHT: 70 IN

## 2024-09-10 DIAGNOSIS — B35.1 ONYCHOMYCOSIS: ICD-10-CM

## 2024-09-10 DIAGNOSIS — M20.41 HAMMER TOES OF BOTH FEET: ICD-10-CM

## 2024-09-10 DIAGNOSIS — M21.962 ACQUIRED DEFORMITY OF LEFT FOOT: ICD-10-CM

## 2024-09-10 DIAGNOSIS — M79.671 PAIN IN BOTH FEET: Primary | ICD-10-CM

## 2024-09-10 DIAGNOSIS — M48.061 LUMBAR FORAMINAL STENOSIS: ICD-10-CM

## 2024-09-10 DIAGNOSIS — M20.42 HAMMER TOES OF BOTH FEET: ICD-10-CM

## 2024-09-10 DIAGNOSIS — M54.16 LUMBAR RADICULOPATHY: ICD-10-CM

## 2024-09-10 DIAGNOSIS — M21.961 ACQUIRED DEFORMITY OF RIGHT FOOT: ICD-10-CM

## 2024-09-10 DIAGNOSIS — M79.672 PAIN IN BOTH FEET: Primary | ICD-10-CM

## 2024-09-10 PROCEDURE — 99213 OFFICE O/P EST LOW 20 MIN: CPT | Performed by: PODIATRIST

## 2024-09-10 RX ORDER — DULOXETIN HYDROCHLORIDE 60 MG/1
60 CAPSULE, DELAYED RELEASE ORAL DAILY
Qty: 30 CAPSULE | Refills: 1 | Status: SHIPPED | OUTPATIENT
Start: 2024-09-10

## 2024-09-10 NOTE — PROGRESS NOTES
Assessment/Plan:  Pain upon ambulation.  Hammertoe formation.  Pes planus.  Need for orthotics.  Ingrown toenail.  Paronychia.  Acquired deformity foot bilateral.  Acquired pes planus bilateral.  History of contusion right big toe     Plan.  Chart reviewed.  Notes reviewed.  Foot exam performed.  Patient educated on condition.  Patient will use orthotics daily.   He will stretch daily.  Patient advised on proper shoe style and sizing.  Patient will use orthotics as directed.  Pedal hygiene care advised upon.  Patient advised on aftercare.  Return for follow-up            Diagnoses and all orders for this visit:     Pain in both feet     Onychomycosis     Hammer toes of both feet     Ingrown toenail     Hammer toe of right foot     Acquired deformity foot bilateral        Acquired pes planus.            Subjective:  Patient has pain upon ambulation.  He has pain in his toes with ambulation.  He has painful hammertoes.  He uses orthotics daily.  No history of trauma.  Patient believes his orthotics are no longer working.  He is requesting new orthotics     No Known Allergies        Current Outpatient Medications:     cholecalciferol (VITAMIN D3) 1,000 units tablet, Take 1,000 Units by mouth daily, Disp: , Rfl:     Cholecalciferol (VITAMIN D3) 13650 units CAPS, Take by mouth once a week, Disp: , Rfl:     diclofenac sodium (VOLTAREN) 1 %, Apply 4 g topically 4 (four) times a day (Patient taking differently: Apply 4 g topically 4 (four) times a day ), Disp: 4 Tube, Rfl: 1    doxazosin (CARDURA) 4 mg tablet, Take 4 mg by mouth daily at bedtime, Disp: , Rfl:     meloxicam (MOBIC) 7.5 mg tablet, Take 1 tablet (7.5 mg total) by mouth daily for 10 days (Patient not taking: Reported on 11/20/2019), Disp: 10 tablet, Rfl: 0    tamsulosin (FLOMAX) 0.4 mg, Take 0.4 mg by mouth daily with dinner, Disp: , Rfl:            Patient Active Problem List   Diagnosis    Plantar fasciitis    Pain in both feet    Congenital pes planus of  right foot    Congenital pes planus of left foot    Tinea pedis of both feet    Dark stools             Patient ID: Jose Olson is a 58 y.o. male.     HPI     The following portions of the patient's history were reviewed and updated as appropriate:      family history includes Cancer in his father; Colon cancer in his maternal aunt; Heart disease in his mother; Hypertension in his father; Stomach cancer in his father.       reports that he has never smoked. He has never used smokeless tobacco. He reports that he drinks alcohol. He reports that he does not use drugs.      Objective:  Patient's shoes and socks removed.   Foot ExamPhysical Exam          Pulses palpable bilateral PT DP and PT pulses 2/4 bilateral  Significant pes planus bilateral, there is decreased  of the arch on weight-bearing  Right 2nd digit there is medial deviation of the PIPJ mild pain on palpation of 2nd MPJ mild swelling  Severe hallux limitus bilateral left worse than right less than 15° of dorsiflexion 1st ray  No hypermobility of the 1st ray  Muscle strength 5/5 all groups bilateral feet and ankles  Moderate equinus bilateral 5° dorsiflexion knee flexed and extended  Negative edema negative erythema no signs of infection  Nails are thickened discolored dystrophic mild onychomycosis  Ingrown nail both borders bilateral hallux     Ingrown right hallux medial border.  Mild hyperkeratotic lesion on the medial border but no sign of infection  Mild pain on palpation plantar medial heel bilateral  Some rigid hammertoes 2nd digit bilateral     Right hallux demonstrates dried subungual hematoma.  No evidence of lysis or cellulitis

## 2024-09-11 LAB
BUN SERPL-MCNC: 11 MG/DL (ref 6–24)
BUN/CREAT SERPL: 10 (ref 9–20)
CALCIUM SERPL-MCNC: 9.1 MG/DL (ref 8.7–10.2)
CHLORIDE SERPL-SCNC: 105 MMOL/L (ref 96–106)
CO2 SERPL-SCNC: 23 MMOL/L (ref 20–29)
CREAT SERPL-MCNC: 1.11 MG/DL (ref 0.76–1.27)
EGFR: 76 ML/MIN/1.73
GLUCOSE SERPL-MCNC: 90 MG/DL (ref 70–99)
HCG INTACT+B SERPL-ACNC: <1 MIU/ML (ref 0–3)
POTASSIUM SERPL-SCNC: 4.1 MMOL/L (ref 3.5–5.2)
SODIUM SERPL-SCNC: 144 MMOL/L (ref 134–144)

## 2024-10-10 NOTE — PROGRESS NOTES
Adult Annual Physical  Name: Jose Olson      : 1965      MRN: [id number]  Encounter Provider: Andreas Jones MD  Encounter Date: 10/11/2024   Encounter department: [facility] [place]    Assessment & Plan  Gynecomastia  Estradiol his seizures came back normal we will order prolactin level recommend patient be seen by the endocrinologist but patient is at present time refusing he also feels like a lump in the right breast area exam shows questionable lump will order diagnostic mammogram    Orders:    Prolactin; Future    Prolactin    Elevated serum creatinine  Repeat creatinine came back at 1.11 stable         Overweight (BMI 25.0-29.9)  Emphasized regarding diet exercise lifestyle modification         Need for COVID-19 vaccine    Orders:    COVID-19 Pfizer mRNA vaccine 12 yr and older (Comirnaty pre-filled syringe)    Flu vaccine need    Orders:    influenza vaccine, recombinant, PF, 0.5 mL IM (Flublok)    Dyslipidemia  Continue atorvastatin 10 mg at bedtime         Mass of right breast, unspecified quadrant    Orders:    Mammo diagnostic bilateral w 3d and cad; Future    Annual physical exam         Immunizations and preventive care screenings were discussed with patient today. Appropriate education was printed on patient's after visit summary.    Discussed risks and benefits of prostate cancer screening. We discussed the controversial history of PSA screening for prostate cancer in the [place] as well as the risk of over detection and over treatment of prostate cancer by way of PSA screening.  The patient understands that PSA blood testing is an imperfect way to screen for prostate cancer and that elevated PSA levels in the blood may also be caused by infection, inflammation, prostatic trauma or manipulation, urological procedures, or by benign prostatic enlargement.    The role of the digital rectal examination in prostate cancer screening was also discussed and I discussed with  him that there is large interobserver variability in the findings of digital rectal examination.    Counseling:  Alcohol/drug use: discussed moderation in alcohol intake, the recommendations for healthy alcohol use, and avoidance of illicit drug use.  Dental Health: discussed importance of regular tooth brushing, flossing, and dental visits.  Injury prevention: discussed safety/seat belts, safety helmets, smoke detectors, carbon monoxide detectors, and smoking near bedding or upholstery.  Sexual health: discussed sexually transmitted diseases, partner selection, use of condoms, avoidance of unintended pregnancy, and contraceptive alternatives.  Exercise: the importance of regular exercise/physical activity was discussed. Recommend exercise 3-5 times per week for at least 30 minutes.       Depression Screening and Follow-up Plan: Patient was screened for depression during today's encounter. They screened negative with a PHQ-2 score of 0.        History of Present Illness patient is coming for evaluation regarding dyslipidemia, BPH, anxiety depression and also concerned about gynecomastia hormone levels so far has been all right he also feels like a lump on the right breast area we will get a mammogram for the same.  Will also recommend evaluation by the endocrinologist we might need to do additional blood test including prolactin levels etc.    Adult Annual Physical:  Patient presents for annual physical.     Diet and Physical Activity:  - Diet/Nutrition: portion control.  - Exercise: walking, 3-4 times a week on average and 30-60 minutes on average.    Depression Screening:  - PHQ-2 Score: 0  - PHQ-9 Score: 0    General Health:  - Sleep: sleeps poorly, 4-6 hours of sleep on average and unrefreshing sleep.  - Hearing: normal hearing bilateral ears.  - Vision: no vision problems.  - Dental: regular dental visits and brushes teeth three times daily. PT FLOSSES     Health:  - History of STDs: no.   - Urinary symptoms:  "urinary frequency.     Advanced Care Planning:  - Has an advanced directive?: no    - Has a durable medical POA?: no    - ACP document given to patient?: yes      Review of Systems   Constitutional:  Negative for chills and fever.   HENT:  Negative for ear pain and sore throat.    Eyes:  Negative for pain and visual disturbance.   Respiratory:  Negative for cough and shortness of breath.    Cardiovascular:  Negative for chest pain and palpitations.   Gastrointestinal:  Negative for abdominal pain and vomiting.   Genitourinary:  Negative for dysuria and hematuria.   Musculoskeletal:  Negative for arthralgias and back pain.   Skin:  Negative for color change and rash.   Neurological:  Negative for seizures and syncope.   All other systems reviewed and are negative.        Objective     /70 (BP Location: Right arm, Patient Position: Sitting, Cuff Size: Standard)   Pulse 74   Ht 5' 10\" (1.778 m)   Wt 96.6 kg (213 lb)   SpO2 98%   BMI 30.56 kg/m²     Physical Exam  Vitals and nursing note reviewed.   Constitutional:       General: He is not in acute distress.     Appearance: He is well-developed.   HENT:      Head: Normocephalic and atraumatic.   Eyes:      Conjunctiva/sclera: Conjunctivae normal.   Cardiovascular:      Rate and Rhythm: Normal rate and regular rhythm.      Heart sounds: No murmur heard.  Pulmonary:      Effort: Pulmonary effort is normal. No respiratory distress.      Breath sounds: Normal breath sounds.   Abdominal:      Palpations: Abdomen is soft.      Tenderness: There is no abdominal tenderness.   Musculoskeletal:         General: No swelling.      Cervical back: Neck supple.   Skin:     General: Skin is warm and dry.      Capillary Refill: Capillary refill takes less than 2 seconds.   Neurological:      Mental Status: He is alert.   Psychiatric:         Mood and Affect: Mood normal.         "

## 2024-10-11 ENCOUNTER — OFFICE VISIT (OUTPATIENT)
Dept: FAMILY MEDICINE CLINIC | Facility: CLINIC | Age: 59
End: 2024-10-11
Payer: COMMERCIAL

## 2024-10-11 VITALS
OXYGEN SATURATION: 98 % | WEIGHT: 213 LBS | HEART RATE: 74 BPM | SYSTOLIC BLOOD PRESSURE: 114 MMHG | DIASTOLIC BLOOD PRESSURE: 70 MMHG | HEIGHT: 70 IN | BODY MASS INDEX: 30.49 KG/M2

## 2024-10-11 DIAGNOSIS — N62 GYNECOMASTIA: Primary | ICD-10-CM

## 2024-10-11 DIAGNOSIS — E66.3 OVERWEIGHT (BMI 25.0-29.9): ICD-10-CM

## 2024-10-11 DIAGNOSIS — Z23 FLU VACCINE NEED: ICD-10-CM

## 2024-10-11 DIAGNOSIS — E78.5 DYSLIPIDEMIA: ICD-10-CM

## 2024-10-11 DIAGNOSIS — Z23 NEED FOR COVID-19 VACCINE: ICD-10-CM

## 2024-10-11 DIAGNOSIS — N63.10 MASS OF RIGHT BREAST, UNSPECIFIED QUADRANT: ICD-10-CM

## 2024-10-11 DIAGNOSIS — R79.89 ELEVATED SERUM CREATININE: ICD-10-CM

## 2024-10-11 DIAGNOSIS — Z00.00 ANNUAL PHYSICAL EXAM: ICD-10-CM

## 2024-10-11 PROCEDURE — 90471 IMMUNIZATION ADMIN: CPT

## 2024-10-11 PROCEDURE — 99213 OFFICE O/P EST LOW 20 MIN: CPT | Performed by: INTERNAL MEDICINE

## 2024-10-11 PROCEDURE — 90673 RIV3 VACCINE NO PRESERV IM: CPT

## 2024-10-11 PROCEDURE — 91320 SARSCV2 VAC 30MCG TRS-SUC IM: CPT

## 2024-10-11 PROCEDURE — 90480 ADMN SARSCOV2 VAC 1/ONLY CMP: CPT

## 2024-10-11 PROCEDURE — 99396 PREV VISIT EST AGE 40-64: CPT | Performed by: INTERNAL MEDICINE

## 2024-10-11 NOTE — ASSESSMENT & PLAN NOTE
Estradiol his seizures came back normal we will order prolactin level recommend patient be seen by the endocrinologist but patient is at present time refusing he also feels like a lump in the right breast area exam shows questionable lump will order diagnostic mammogram    Orders:    Prolactin; Future    Prolactin

## 2024-10-16 LAB — PROLACTIN SERPL-MCNC: 15.4 NG/ML (ref 3.6–25.2)

## 2024-11-04 DIAGNOSIS — M48.061 LUMBAR FORAMINAL STENOSIS: ICD-10-CM

## 2024-11-04 DIAGNOSIS — M54.16 LUMBAR RADICULOPATHY: ICD-10-CM

## 2024-11-04 RX ORDER — DULOXETIN HYDROCHLORIDE 60 MG/1
60 CAPSULE, DELAYED RELEASE ORAL DAILY
Qty: 30 CAPSULE | Refills: 1 | Status: SHIPPED | OUTPATIENT
Start: 2024-11-04

## 2024-11-26 ENCOUNTER — OFFICE VISIT (OUTPATIENT)
Age: 59
End: 2024-11-26
Payer: COMMERCIAL

## 2024-11-26 VITALS
HEIGHT: 70 IN | DIASTOLIC BLOOD PRESSURE: 84 MMHG | HEART RATE: 80 BPM | SYSTOLIC BLOOD PRESSURE: 134 MMHG | BODY MASS INDEX: 30.56 KG/M2 | RESPIRATION RATE: 16 BRPM | WEIGHT: 213.5 LBS

## 2024-11-26 DIAGNOSIS — M79.671 PAIN IN BOTH FEET: Primary | ICD-10-CM

## 2024-11-26 DIAGNOSIS — B35.1 ONYCHOMYCOSIS: ICD-10-CM

## 2024-11-26 DIAGNOSIS — B35.3 TINEA PEDIS OF BOTH FEET: ICD-10-CM

## 2024-11-26 DIAGNOSIS — M21.962 ACQUIRED DEFORMITY OF LEFT FOOT: ICD-10-CM

## 2024-11-26 DIAGNOSIS — M79.672 PAIN IN BOTH FEET: Primary | ICD-10-CM

## 2024-11-26 DIAGNOSIS — M20.41 HAMMER TOES OF BOTH FEET: ICD-10-CM

## 2024-11-26 DIAGNOSIS — M21.42 ACQUIRED FLAT FOOT, LEFT: ICD-10-CM

## 2024-11-26 DIAGNOSIS — M21.961 ACQUIRED DEFORMITY OF RIGHT FOOT: ICD-10-CM

## 2024-11-26 DIAGNOSIS — M20.42 HAMMER TOES OF BOTH FEET: ICD-10-CM

## 2024-11-26 DIAGNOSIS — M21.41 ACQUIRED FLAT FOOT, RIGHT: ICD-10-CM

## 2024-11-26 PROCEDURE — 99213 OFFICE O/P EST LOW 20 MIN: CPT | Performed by: PODIATRIST

## 2024-11-26 NOTE — PROGRESS NOTES
Assessment/Plan:  Pain upon ambulation.  Hammertoe formation.  Pes planus.  Need for orthotics.  Ingrown toenail.  Paronychia.  Acquired deformity foot bilateral.  Acquired pes planus bilateral.  History of contusion right big toe     Plan.  Chart reviewed.  Notes reviewed.  Foot exam performed.  Patient educated on condition.  Patient will use orthotics daily.   He will stretch daily.  Patient advised on proper shoe style and sizing.  Patient will use orthotics as directed.  Pedal hygiene care advised upon.  Patient advised on aftercare.  Return for follow-up.  Watch for signs of infection.            Diagnoses and all orders for this visit:     Pain in both feet     Onychomycosis     Hammer toes of both feet     Ingrown toenail     Hammer toe of right foot     Acquired deformity foot bilateral        Acquired pes planus.            Subjective:  Patient has pain upon ambulation.  He has pain in his toes with ambulation.  He has painful hammertoes.  He uses orthotics daily.  No history of trauma.  Patient believes his orthotics are no longer working.  He is requesting new orthotics     No Known Allergies        Current Outpatient Medications:     cholecalciferol (VITAMIN D3) 1,000 units tablet, Take 1,000 Units by mouth daily, Disp: , Rfl:     Cholecalciferol (VITAMIN D3) 08319 units CAPS, Take by mouth once a week, Disp: , Rfl:     diclofenac sodium (VOLTAREN) 1 %, Apply 4 g topically 4 (four) times a day (Patient taking differently: Apply 4 g topically 4 (four) times a day ), Disp: 4 Tube, Rfl: 1    doxazosin (CARDURA) 4 mg tablet, Take 4 mg by mouth daily at bedtime, Disp: , Rfl:     meloxicam (MOBIC) 7.5 mg tablet, Take 1 tablet (7.5 mg total) by mouth daily for 10 days (Patient not taking: Reported on 11/20/2019), Disp: 10 tablet, Rfl: 0    tamsulosin (FLOMAX) 0.4 mg, Take 0.4 mg by mouth daily with dinner, Disp: , Rfl:            Patient Active Problem List   Diagnosis    Plantar fasciitis    Pain in both  feet    Congenital pes planus of right foot    Congenital pes planus of left foot    Tinea pedis of both feet    Dark stools             Patient ID: Jose Olson is a 59 y.o. male.     HPI     The following portions of the patient's history were reviewed and updated as appropriate:      family history includes Cancer in his father; Colon cancer in his maternal aunt; Heart disease in his mother; Hypertension in his father; Stomach cancer in his father.       reports that he has never smoked. He has never used smokeless tobacco. He reports that he drinks alcohol. He reports that he does not use drugs.      Objective:  Patient's shoes and socks removed.   Foot ExamPhysical Exam          Pulses palpable bilateral PT DP and PT pulses 2/4 bilateral  Significant pes planus bilateral, there is decreased  of the arch on weight-bearing  Right 2nd digit there is medial deviation of the PIPJ mild pain on palpation of 2nd MPJ mild swelling  Severe hallux limitus bilateral left worse than right less than 15° of dorsiflexion 1st ray  No hypermobility of the 1st ray  Muscle strength 5/5 all groups bilateral feet and ankles  Moderate equinus bilateral 5° dorsiflexion knee flexed and extended  Negative edema negative erythema no signs of infection  Nails are thickened discolored dystrophic mild onychomycosis  Ingrown nail both borders bilateral hallux     Ingrown right hallux medial border.  Mild hyperkeratotic lesion on the medial border but no sign of infection  Mild pain on palpation plantar medial heel bilateral  Some rigid hammertoes 2nd digit bilateral     Right hallux demonstrates dried subungual hematoma.  No evidence of lysis or cellulitis

## 2024-12-19 NOTE — PROGRESS NOTES
Office Visit Note  24     Jose Olson 59 y.o. male MRN: [id number]  : 1965    Assessment:     1. Gynecomastia  Assessment & Plan:  Prolactin level also came back normal.  Will also have him seen by the endocrinologist.  Follow-up after the mammogram  Orders:  -     Ambulatory referral to Endocrinology; Future  2. Benign prostatic hyperplasia without lower urinary tract symptoms  Assessment & Plan:  Patient is seen by the urologist periodically for his BPH currently taking Flomax which appears to be helping we will continue with the same.  3. Dyslipidemia  Assessment & Plan:  Currently taking atorvastatin 10 mg at bedtime we will follow-up with repeat lipid profile  4. Overweight (BMI 25.0-29.9)  Assessment & Plan:  Patient BMI went up slightly 30.82 again recommend diet exercise lifestyle modification.             Discussion Summary and Plan:  Today's care plan and medications were reviewed with patient in detail and all their questions answered to their satisfaction.    Chief Complaint   Patient presents with    Follow-up      Subjective:  Patient is coming here for a follow-up evaluation with regards to the symptoms of gynecomastia we did a workup including the prolactin level estradiol levels and also testosterone levels they are in the range.  Patient also with a history of hypercholesterolemia on atorvastatin.    History of BPH and tamsulosin being followed by the urologist.  Medications reviewed labs reviewed.        The following portions of the patient's history were reviewed and updated as appropriate: allergies, current medications, past family history, past medical history, past social history, past surgical history and problem list.    Review of Systems   Constitutional:  Negative for chills and fever.   HENT:  Negative for ear pain and sore throat.    Eyes:  Negative for pain and visual disturbance.   Respiratory:  Negative for cough and shortness of breath.    Cardiovascular:   Negative for chest pain and palpitations.   Gastrointestinal:  Negative for abdominal pain and vomiting.   Genitourinary:  Negative for dysuria and hematuria.   Musculoskeletal:  Negative for arthralgias and back pain.   Skin:  Negative for color change and rash.   Neurological:  Negative for seizures and syncope.   All other systems reviewed and are negative.        Historical Information   Patient Active Problem List   Diagnosis    Plantar fasciitis    Pain in both feet    Congenital pes planus of right foot    Congenital pes planus of left foot    Tinea pedis of both feet    Grade II hemorrhoids    HTN (hypertension)    Dyslipidemia    Benign prostatic hyperplasia without lower urinary tract symptoms    URI (upper respiratory infection)    Lumbar radiculopathy    Shortness of breath    Gynecomastia    Overweight (BMI 25.0-29.9)    Elevated serum creatinine     Past Medical History:   Diagnosis Date    BPH (benign prostatic hyperplasia)     Class 2 obesity in adult 10/18/2022    Hypertension      Past Surgical History:   Procedure Laterality Date    COLONOSCOPY N/A 10/18/2017    Procedure: COLONOSCOPY;  Surgeon: Gia Borrego MD;  Location: [facility] GI LAB;  Service: Gastroenterology    EPIDURAL BLOCK INJECTION Left 09/16/2021    Procedure: L3 L4 transforaminal epidural steroid injection ( [id number]);  Surgeon: Lu Seo MD;  Location: [facility] MAIN OR;  Service: Pain Management     EPIDURAL BLOCK INJECTION N/A 07/12/2023    Procedure: L3 L4  LUMBAR epidural steroid injection ([id number]);  Surgeon: Daniel Viveros DO;  Location: [facility] MAIN OR;  Service: Pain Management     EPIDURAL BLOCK INJECTION N/A 11/15/2023    Procedure: L4-L5  LUMBAR epidural steroid injection ([id number]);  Surgeon: Daniel Viveros DO;  Location: [facility] MAIN OR;  Service: Pain Management     HEMORRHOID SURGERY      LUMBAR EPIDURAL INJECTION N/A 04/19/2023    Procedure: L3 L4 LUMBAR epidural steroid injection [id number];  Surgeon: Daniel  "Charles Viveros, DO;  Location: [facility] MAIN OR;  Service: Pain Management     SKIN BIOPSY       Social History     Substance and Sexual Activity   Alcohol Use Not Currently     Social History     Substance and Sexual Activity   Drug Use No     Social History     Tobacco Use   Smoking Status Never    Passive exposure: Never   Smokeless Tobacco Never     Family History   Problem Relation Age of Onset    Heart disease Mother     Cancer Father         stomach    Hypertension Father     Stomach cancer Father     Skin cancer Father     Colon cancer Maternal Aunt      Health Maintenance Due   Topic    Hepatitis C Screening     HIV Screening     DTaP,Tdap,and Td Vaccines (1 - Tdap)    Zoster Vaccine (1 of 2)      Meds/Allergies       Current Outpatient Medications:     atorvastatin (LIPITOR) 10 mg tablet, Take 1 tablet (10 mg total) by mouth daily at bedtime, Disp: 30 tablet, Rfl: 5    cholecalciferol (VITAMIN D3) 1,000 units tablet, Take 1,000 Units by mouth daily, Disp: , Rfl:     doxazosin (CARDURA) 4 mg tablet, Take 4 mg by mouth daily at bedtime, Disp: , Rfl:     DULoxetine (CYMBALTA) 60 mg delayed release capsule, TAKE 1 CAPSULE DAILY, Disp: 30 capsule, Rfl: 1    tamsulosin (FLOMAX) 0.4 mg, Take 0.4 mg by mouth daily with dinner, Disp: , Rfl:       Objective:    Vitals:   /80 (BP Location: Right arm, Patient Position: Sitting, Cuff Size: Standard)   Pulse 76   Ht 5' 10\" (1.778 m)   Wt 97.4 kg (214 lb 12.8 oz)   SpO2 98%   BMI 30.82 kg/m²   Body mass index is 30.82 kg/m².  Vitals:    12/20/24 1417   Weight: 97.4 kg (214 lb 12.8 oz)       Physical Exam  Vitals and nursing note reviewed.   Constitutional:       Appearance: Normal appearance.   Cardiovascular:      Rate and Rhythm: Normal rate and regular rhythm.      Heart sounds: Normal heart sounds.   Pulmonary:      Effort: Pulmonary effort is normal.      Breath sounds: Normal breath sounds.   Abdominal:      Palpations: Abdomen is soft.   Musculoskeletal:   " "      General: Normal range of motion.      Right lower leg: No edema.      Left lower leg: No edema.   Skin:     General: Skin is warm and dry.   Neurological:      General: No focal deficit present.      Mental Status: He is alert and oriented to person, place, and time.   Psychiatric:         Mood and Affect: Mood normal.         Behavior: Behavior normal.         Lab Review   No visits with results within 2 Month(s) from this visit.   Latest known visit with results is:   Office Visit on 10/11/2024   Component Date Value Ref Range Status    Prolactin 10/15/2024 15.4  3.6 - 25.2 ng/mL Final         Andreas Jones MD        \"This note has been constructed using a voice recognition system.Therefore there may be syntax, spelling, and/or grammatical errors. Please call if you have any questions. \"  "

## 2024-12-20 ENCOUNTER — OFFICE VISIT (OUTPATIENT)
Dept: FAMILY MEDICINE CLINIC | Facility: CLINIC | Age: 59
End: 2024-12-20
Payer: COMMERCIAL

## 2024-12-20 VITALS
WEIGHT: 214.8 LBS | HEIGHT: 70 IN | HEART RATE: 76 BPM | SYSTOLIC BLOOD PRESSURE: 134 MMHG | DIASTOLIC BLOOD PRESSURE: 80 MMHG | OXYGEN SATURATION: 98 % | BODY MASS INDEX: 30.75 KG/M2

## 2024-12-20 DIAGNOSIS — E66.3 OVERWEIGHT (BMI 25.0-29.9): ICD-10-CM

## 2024-12-20 DIAGNOSIS — E78.5 DYSLIPIDEMIA: ICD-10-CM

## 2024-12-20 DIAGNOSIS — N40.0 BENIGN PROSTATIC HYPERPLASIA WITHOUT LOWER URINARY TRACT SYMPTOMS: ICD-10-CM

## 2024-12-20 DIAGNOSIS — N62 GYNECOMASTIA: Primary | ICD-10-CM

## 2024-12-20 PROCEDURE — 99214 OFFICE O/P EST MOD 30 MIN: CPT | Performed by: INTERNAL MEDICINE

## 2024-12-20 NOTE — ASSESSMENT & PLAN NOTE
Patient is seen by the urologist periodically for his BPH currently taking Flomax which appears to be helping we will continue with the same.

## 2024-12-20 NOTE — ASSESSMENT & PLAN NOTE
Prolactin level also came back normal.  Will also have him seen by the endocrinologist.  Follow-up after the mammogram

## 2024-12-26 ENCOUNTER — HOSPITAL ENCOUNTER (OUTPATIENT)
Dept: RADIOLOGY | Facility: HOSPITAL | Age: 59
Discharge: HOME | End: 2024-12-26
Attending: INTERNAL MEDICINE
Payer: COMMERCIAL

## 2024-12-26 VITALS — HEIGHT: 70 IN | BODY MASS INDEX: 30.64 KG/M2 | WEIGHT: 214 LBS

## 2024-12-26 DIAGNOSIS — N63.10 MASS OF RIGHT BREAST, UNSPECIFIED QUADRANT: ICD-10-CM

## 2024-12-26 PROCEDURE — 76642 ULTRASOUND BREAST LIMITED: CPT

## 2024-12-26 PROCEDURE — 77066 DX MAMMO INCL CAD BI: CPT

## 2024-12-26 PROCEDURE — G0279 TOMOSYNTHESIS, MAMMO: HCPCS

## 2024-12-29 ENCOUNTER — RESULTS FOLLOW-UP (OUTPATIENT)
Dept: FAMILY MEDICINE CLINIC | Facility: CLINIC | Age: 59
End: 2024-12-29

## 2024-12-31 ENCOUNTER — TELEPHONE (OUTPATIENT)
Dept: FAMILY MEDICINE CLINIC | Facility: CLINIC | Age: 59
End: 2024-12-31

## 2025-01-03 DIAGNOSIS — M54.16 LUMBAR RADICULOPATHY: ICD-10-CM

## 2025-01-03 DIAGNOSIS — E78.5 DYSLIPIDEMIA: ICD-10-CM

## 2025-01-03 DIAGNOSIS — M48.061 LUMBAR FORAMINAL STENOSIS: ICD-10-CM

## 2025-01-03 RX ORDER — DULOXETIN HYDROCHLORIDE 60 MG/1
60 CAPSULE, DELAYED RELEASE ORAL DAILY
Qty: 30 CAPSULE | Refills: 1 | Status: SHIPPED | OUTPATIENT
Start: 2025-01-03

## 2025-01-04 RX ORDER — ATORVASTATIN CALCIUM 10 MG/1
10 TABLET, FILM COATED ORAL
Qty: 30 TABLET | Refills: 5 | Status: SHIPPED | OUTPATIENT
Start: 2025-01-04

## 2025-01-10 ENCOUNTER — OFFICE VISIT (OUTPATIENT)
Dept: FAMILY MEDICINE CLINIC | Facility: CLINIC | Age: 60
End: 2025-01-10
Payer: COMMERCIAL

## 2025-01-10 VITALS
OXYGEN SATURATION: 97 % | HEART RATE: 80 BPM | SYSTOLIC BLOOD PRESSURE: 130 MMHG | HEIGHT: 70 IN | BODY MASS INDEX: 31.15 KG/M2 | DIASTOLIC BLOOD PRESSURE: 78 MMHG | WEIGHT: 217.6 LBS

## 2025-01-10 DIAGNOSIS — E66.811 CLASS 1 OBESITY DUE TO EXCESS CALORIES WITHOUT SERIOUS COMORBIDITY WITH BODY MASS INDEX (BMI) OF 31.0 TO 31.9 IN ADULT: ICD-10-CM

## 2025-01-10 DIAGNOSIS — N62 GYNECOMASTIA: Primary | ICD-10-CM

## 2025-01-10 DIAGNOSIS — E55.9 VITAMIN D DEFICIENCY: ICD-10-CM

## 2025-01-10 DIAGNOSIS — E78.5 DYSLIPIDEMIA: ICD-10-CM

## 2025-01-10 DIAGNOSIS — Z13.0 SCREENING FOR DEFICIENCY ANEMIA: ICD-10-CM

## 2025-01-10 DIAGNOSIS — Z13.29 SCREENING FOR THYROID DISORDER: ICD-10-CM

## 2025-01-10 DIAGNOSIS — E66.09 CLASS 1 OBESITY DUE TO EXCESS CALORIES WITHOUT SERIOUS COMORBIDITY WITH BODY MASS INDEX (BMI) OF 31.0 TO 31.9 IN ADULT: ICD-10-CM

## 2025-01-10 DIAGNOSIS — N40.0 BENIGN PROSTATIC HYPERPLASIA WITHOUT LOWER URINARY TRACT SYMPTOMS: ICD-10-CM

## 2025-01-10 DIAGNOSIS — R73.03 PRE-DIABETES: ICD-10-CM

## 2025-01-10 DIAGNOSIS — M54.16 LUMBAR RADICULOPATHY: ICD-10-CM

## 2025-01-10 DIAGNOSIS — R79.89 ELEVATED SERUM CREATININE: ICD-10-CM

## 2025-01-10 DIAGNOSIS — I10 PRIMARY HYPERTENSION: ICD-10-CM

## 2025-01-10 PROCEDURE — 99214 OFFICE O/P EST MOD 30 MIN: CPT | Performed by: INTERNAL MEDICINE

## 2025-01-10 NOTE — ASSESSMENT & PLAN NOTE
As mentioned above mammogram ultrasound came back normal prolactin levels came back normal recommend patient be seen by the endocrinologist which I ordered earlier but that he has not seen them yet we will reorder

## 2025-01-10 NOTE — ASSESSMENT & PLAN NOTE
Patient with BPH currently taking Flomax 0.4 mg and also Cardura 4 mg we will continue with the same

## 2025-01-10 NOTE — ASSESSMENT & PLAN NOTE
Currently patient is taking atorvastatin 10 mg at bedtime I ordered a repeat lipid profile adjust the dose if necessary.

## 2025-01-10 NOTE — ASSESSMENT & PLAN NOTE
Last creatinine was 1.11 recommend patient strongly to drink more fluids will follow-up with repeat lab

## 2025-01-10 NOTE — ASSESSMENT & PLAN NOTE
Patient BMI is 31.22 it was 30.05 again emphasized regarding diet exercise lifestyle modification

## 2025-01-10 NOTE — ASSESSMENT & PLAN NOTE
Patient is being followed by the pain and spine physician currently on Cymbalta status post steroid injections in the past

## 2025-01-10 NOTE — PROGRESS NOTES
Office Visit Note  01/10/25     Jose Olson 59 y.o. male MRN: [id number]  : 1965    Assessment:     1. Gynecomastia  Assessment & Plan:  As mentioned above mammogram ultrasound came back normal prolactin levels came back normal recommend patient be seen by the endocrinologist which I ordered earlier but that he has not seen them yet we will reorder  Orders:  -     Ambulatory referral to Endocrinology; Future  2. Dyslipidemia  Assessment & Plan:  Currently patient is taking atorvastatin 10 mg at bedtime I ordered a repeat lipid profile adjust the dose if necessary.  Orders:  -     Lipid panel; Future  -     Lipid panel  3. Class 1 obesity due to excess calories without serious comorbidity with body mass index (BMI) of 31.0 to 31.9 in adult  Assessment & Plan:      Patient BMI is 31.22 it was 30.05 again emphasized regarding diet exercise lifestyle modification  4. Benign prostatic hyperplasia without lower urinary tract symptoms  Assessment & Plan:  Patient with BPH currently taking Flomax 0.4 mg and also Cardura 4 mg we will continue with the same  5. Screening for thyroid disorder  -     TSH, 3rd generation with Free T4 reflex; Future  -     TSH, 3rd generation with Free T4 reflex  6. Screening for deficiency anemia  -     CBC and differential; Future  -     CBC and differential  7. Vitamin D deficiency  -     Vitamin D 25 hydroxy; Future  -     Vitamin D 25 hydroxy  8. Elevated serum creatinine  Assessment & Plan:  Last creatinine was 1.11 recommend patient strongly to drink more fluids will follow-up with repeat lab  9. Primary hypertension  Assessment & Plan:  Stable at present time 130/78  10. Lumbar radiculopathy  Assessment & Plan:  Patient is being followed by the pain and spine physician currently on Cymbalta status post steroid injections in the past  11. Pre-diabetes  -     Comprehensive metabolic panel; Future  -     Hemoglobin A1C; Future  -     Urinalysis with microscopic; Future  -      Comprehensive metabolic panel  -     Hemoglobin A1C  -     Urinalysis with microscopic             Discussion Summary and Plan:  Today's care plan and medications were reviewed with patient in detail and all their questions answered to their satisfaction.    Chief Complaint   Patient presents with    Follow-up      Subjective:  Patient is coming here for a follow-up evaluation with regards to the symptoms of gynecomastia for which she had a mammogram and an ultrasound done which came back negative patient is trying to make an appointment with endocrinologist but he is getting the month of June.  All the lab work we have done for the hormones were unremarkable        The following portions of the patient's history were reviewed and updated as appropriate: allergies, current medications, past family history, past medical history, past social history, past surgical history and problem list.    Review of Systems   Constitutional:  Negative for chills and fever.   HENT:  Negative for ear pain and sore throat.    Eyes:  Negative for pain and visual disturbance.   Respiratory:  Negative for cough and shortness of breath.    Cardiovascular:  Negative for chest pain and palpitations.   Gastrointestinal:  Negative for abdominal pain and vomiting.   Genitourinary:  Negative for dysuria and hematuria.   Musculoskeletal:  Negative for arthralgias and back pain.   Skin:  Negative for color change and rash.   Neurological:  Negative for seizures and syncope.   All other systems reviewed and are negative.        Historical Information   Patient Active Problem List   Diagnosis    Plantar fasciitis    Pain in both feet    Congenital pes planus of right foot    Congenital pes planus of left foot    Tinea pedis of both feet    Grade II hemorrhoids    HTN (hypertension)    Dyslipidemia    Benign prostatic hyperplasia without lower urinary tract symptoms    Class 1 obesity due to excess calories without serious comorbidity in adult     URI (upper respiratory infection)    Lumbar radiculopathy    Shortness of breath    Gynecomastia    Overweight (BMI 25.0-29.9)    Elevated serum creatinine     Past Medical History:   Diagnosis Date    BPH (benign prostatic hyperplasia)     Class 2 obesity in adult 10/18/2022    Hypertension      Past Surgical History:   Procedure Laterality Date    COLONOSCOPY N/A 10/18/2017    Procedure: COLONOSCOPY;  Surgeon: Gia Borrego MD;  Location: [facility] GI LAB;  Service: Gastroenterology    EPIDURAL BLOCK INJECTION Left 09/16/2021    Procedure: L3 L4 transforaminal epidural steroid injection ( [id number]);  Surgeon: Lu Seo MD;  Location: [facility] MAIN OR;  Service: Pain Management     EPIDURAL BLOCK INJECTION N/A 07/12/2023    Procedure: L3 L4  LUMBAR epidural steroid injection ([id number]);  Surgeon: Daniel Vivreos DO;  Location: [facility] MAIN OR;  Service: Pain Management     EPIDURAL BLOCK INJECTION N/A 11/15/2023    Procedure: L4-L5  LUMBAR epidural steroid injection ([id number]);  Surgeon: Daniel Viveros DO;  Location: [facility] MAIN OR;  Service: Pain Management     HEMORRHOID SURGERY      LUMBAR EPIDURAL INJECTION N/A 04/19/2023    Procedure: L3 L4 LUMBAR epidural steroid injection [id number];  Surgeon: Daniel Viveros DO;  Location: [facility] MAIN OR;  Service: Pain Management     SKIN BIOPSY       Social History     Substance and Sexual Activity   Alcohol Use Not Currently     Social History     Substance and Sexual Activity   Drug Use No     Social History     Tobacco Use   Smoking Status Never    Passive exposure: Never   Smokeless Tobacco Never     Family History   Problem Relation Age of Onset    Heart disease Mother     Cancer Father         stomach    Hypertension Father     Stomach cancer Father     Skin cancer Father     Colon cancer Maternal Aunt      Health Maintenance Due   Topic    Hepatitis C Screening     HIV Screening     DTaP,Tdap,and Td Vaccines (1 - Tdap)    Zoster Vaccine (1 of 2)     "  Meds/Allergies       Current Outpatient Medications:     atorvastatin (LIPITOR) 10 mg tablet, TAKE 1 TABLET BY MOUTH DAILY AT BEDTIME, Disp: 30 tablet, Rfl: 5    cholecalciferol (VITAMIN D3) 1,000 units tablet, Take 1,000 Units by mouth daily, Disp: , Rfl:     doxazosin (CARDURA) 4 mg tablet, Take 4 mg by mouth daily at bedtime, Disp: , Rfl:     DULoxetine (CYMBALTA) 60 mg delayed release capsule, TAKE 1 CAPSULE DAILY, Disp: 30 capsule, Rfl: 1    tamsulosin (FLOMAX) 0.4 mg, Take 0.4 mg by mouth daily with dinner, Disp: , Rfl:       Objective:    Vitals:   /78 (BP Location: Right arm, Patient Position: Sitting, Cuff Size: Standard)   Pulse 80   Ht 5' 10\" (1.778 m)   Wt 98.7 kg (217 lb 9.6 oz)   SpO2 97%   BMI 31.22 kg/m²   Body mass index is 31.22 kg/m².  Vitals:    01/10/25 1134   Weight: 98.7 kg (217 lb 9.6 oz)       Physical Exam  Vitals and nursing note reviewed.   Constitutional:       Appearance: Normal appearance.   Cardiovascular:      Rate and Rhythm: Normal rate and regular rhythm.      Heart sounds: Normal heart sounds.   Pulmonary:      Effort: Pulmonary effort is normal.      Breath sounds: Normal breath sounds.   Musculoskeletal:         General: Normal range of motion.      Right lower leg: No edema.      Left lower leg: No edema.   Neurological:      Mental Status: He is alert and oriented to person, place, and time.   Psychiatric:         Mood and Affect: Mood normal.         Behavior: Behavior normal.         Lab Review   No visits with results within 2 Month(s) from this visit.   Latest known visit with results is:   Office Visit on 10/11/2024   Component Date Value Ref Range Status    Prolactin 10/15/2024 15.4  3.6 - 25.2 ng/mL Final         Andreas Jones MD        \"This note has been constructed using a voice recognition system.Therefore there may be syntax, spelling, and/or grammatical errors. Please call if you have any questions. \"  "

## 2025-01-13 DIAGNOSIS — E55.9 VITAMIN D DEFICIENCY: Primary | ICD-10-CM

## 2025-01-13 LAB
25(OH)D3+25(OH)D2 SERPL-MCNC: 20 NG/ML (ref 30–100)
ALBUMIN SERPL-MCNC: 4.2 G/DL (ref 3.8–4.9)
ALP SERPL-CCNC: 79 IU/L (ref 44–121)
ALT SERPL-CCNC: 17 IU/L (ref 0–44)
APPEARANCE UR: CLEAR
AST SERPL-CCNC: 16 IU/L (ref 0–40)
BACTERIA URNS QL MICRO: NORMAL
BASOPHILS # BLD AUTO: 0 X10E3/UL (ref 0–0.2)
BASOPHILS NFR BLD AUTO: 1 %
BILIRUB SERPL-MCNC: 0.4 MG/DL (ref 0–1.2)
BILIRUB UR QL STRIP: NEGATIVE
BUN SERPL-MCNC: 15 MG/DL (ref 8–27)
BUN/CREAT SERPL: 15 (ref 10–24)
CALCIUM SERPL-MCNC: 8.9 MG/DL (ref 8.6–10.2)
CASTS URNS QL MICRO: NORMAL /LPF
CHLORIDE SERPL-SCNC: 106 MMOL/L (ref 96–106)
CHOLEST SERPL-MCNC: 116 MG/DL (ref 100–199)
CHOLEST/HDLC SERPL: 3.6 RATIO (ref 0–5)
CO2 SERPL-SCNC: 25 MMOL/L (ref 20–29)
COLOR UR: YELLOW
CREAT SERPL-MCNC: 0.99 MG/DL (ref 0.76–1.27)
EGFR: 87 ML/MIN/1.73
EOSINOPHIL # BLD AUTO: 0.1 X10E3/UL (ref 0–0.4)
EOSINOPHIL NFR BLD AUTO: 2 %
EPI CELLS #/AREA URNS HPF: NORMAL /HPF (ref 0–10)
ERYTHROCYTE [DISTWIDTH] IN BLOOD BY AUTOMATED COUNT: 12.7 % (ref 11.6–15.4)
EST. AVERAGE GLUCOSE BLD GHB EST-MCNC: 100 MG/DL
GLOBULIN SER-MCNC: 2.3 G/DL (ref 1.5–4.5)
GLUCOSE SERPL-MCNC: 88 MG/DL (ref 70–99)
GLUCOSE UR QL: NEGATIVE
HBA1C MFR BLD: 5.1 % (ref 4.8–5.6)
HCT VFR BLD AUTO: 41 % (ref 37.5–51)
HDLC SERPL-MCNC: 32 MG/DL
HGB BLD-MCNC: 13.5 G/DL (ref 13–17.7)
HGB UR QL STRIP: NEGATIVE
IMM GRANULOCYTES # BLD: 0 X10E3/UL (ref 0–0.1)
IMM GRANULOCYTES NFR BLD: 0 %
KETONES UR QL STRIP: NEGATIVE
LDLC SERPL CALC-MCNC: 70 MG/DL (ref 0–99)
LEUKOCYTE ESTERASE UR QL STRIP: NEGATIVE
LYMPHOCYTES # BLD AUTO: 1.6 X10E3/UL (ref 0.7–3.1)
LYMPHOCYTES NFR BLD AUTO: 27 %
MCH RBC QN AUTO: 29.4 PG (ref 26.6–33)
MCHC RBC AUTO-ENTMCNC: 32.9 G/DL (ref 31.5–35.7)
MCV RBC AUTO: 89 FL (ref 79–97)
MICRO URNS: NORMAL
MICRO URNS: NORMAL
MONOCYTES # BLD AUTO: 0.4 X10E3/UL (ref 0.1–0.9)
MONOCYTES NFR BLD AUTO: 7 %
NEUTROPHILS # BLD AUTO: 3.7 X10E3/UL (ref 1.4–7)
NEUTROPHILS NFR BLD AUTO: 63 %
NITRITE UR QL STRIP: NEGATIVE
PH UR STRIP: 6 [PH] (ref 5–7.5)
PLATELET # BLD AUTO: 200 X10E3/UL (ref 150–450)
POTASSIUM SERPL-SCNC: 3.8 MMOL/L (ref 3.5–5.2)
PROT SERPL-MCNC: 6.5 G/DL (ref 6–8.5)
PROT UR QL STRIP: NORMAL
RBC # BLD AUTO: 4.59 X10E6/UL (ref 4.14–5.8)
RBC #/AREA URNS HPF: NORMAL /HPF (ref 0–2)
SL AMB VLDL CHOLESTEROL CALC: 14 MG/DL (ref 5–40)
SODIUM SERPL-SCNC: 143 MMOL/L (ref 134–144)
SP GR UR: 1.02 (ref 1–1.03)
TRIGL SERPL-MCNC: 67 MG/DL (ref 0–149)
TSH SERPL DL<=0.005 MIU/L-ACNC: 1.66 UIU/ML (ref 0.45–4.5)
UROBILINOGEN UR STRIP-ACNC: 0.2 MG/DL (ref 0.2–1)
WBC # BLD AUTO: 5.9 X10E3/UL (ref 3.4–10.8)
WBC #/AREA URNS HPF: NORMAL /HPF (ref 0–5)

## 2025-02-12 NOTE — PROGRESS NOTES
Office Visit Note  25     Jose Olson 59 y.o. male MRN: [id number]  : 1965    Assessment:     1. Benign prostatic hyperplasia without lower urinary tract symptoms  Assessment & Plan:  Patient with BPH being followed by the urologist currently taking tamsulosin 0.4 mg daily and Cardura 4 mg daily continue  2. Class 1 obesity due to excess calories without serious comorbidity with body mass index (BMI) of 31.0 to 31.9 in adult  Assessment & Plan:      Patient's BMI is at 31.80 emphasized regarding diet exercise lifestyle modification  3. Dyslipidemia  Assessment & Plan:  Patient is on atorvastatin 10 mg at bedtime lipid profile shows cholesterol 116 triglycerides 67 HDL 32 and LDL at 70 continue with the current dosage of medication atorvastatin 10 mg at bedtime  4. Gynecomastia  Assessment & Plan:  Patient to see the endocrinologist.  Patient also wants to see the plastic surgeon  5. Elevated serum creatinine  Assessment & Plan:  Lab reveals creatinine 0.99 GFR 87 stable             Discussion Summary and Plan:  Today's care plan and medications were reviewed with patient in detail and all their questions answered to their satisfaction.    Chief Complaint   Patient presents with   • Follow-up      Subjective:  Patient is coming for evaluation regarding his symptoms of dyslipidemia, BPH, gynecomastia.  Recently had lab work done came back unremarkable HDL is on the lower side.  Medications reviewed labs reviewed.        The following portions of the patient's history were reviewed and updated as appropriate: allergies, current medications, past family history, past medical history, past social history, past surgical history and problem list.    Review of Systems   Constitutional:  Negative for chills and fever.   HENT:  Negative for ear pain and sore throat.    Eyes:  Negative for pain and visual disturbance.   Respiratory:  Negative for cough and shortness of breath.    Cardiovascular:  Negative  for chest pain and palpitations.   Gastrointestinal:  Negative for abdominal pain and vomiting.   Genitourinary:  Negative for dysuria and hematuria.   Musculoskeletal:  Negative for arthralgias and back pain.   Skin:  Negative for color change and rash.   Neurological:  Negative for seizures and syncope.   All other systems reviewed and are negative.        Historical Information   Patient Active Problem List   Diagnosis   • Plantar fasciitis   • Pain in both feet   • Congenital pes planus of right foot   • Congenital pes planus of left foot   • Tinea pedis of both feet   • Grade II hemorrhoids   • HTN (hypertension)   • Dyslipidemia   • Benign prostatic hyperplasia without lower urinary tract symptoms   • Class 1 obesity due to excess calories without serious comorbidity in adult   • URI (upper respiratory infection)   • Lumbar radiculopathy   • Shortness of breath   • Gynecomastia   • Overweight (BMI 25.0-29.9)   • Elevated serum creatinine     Past Medical History:   Diagnosis Date   • BPH (benign prostatic hyperplasia)    • Class 2 obesity in adult 10/18/2022   • Hypertension      Past Surgical History:   Procedure Laterality Date   • COLONOSCOPY N/A 10/18/2017    Procedure: COLONOSCOPY;  Surgeon: Gia Borrego MD;  Location: [facility] GI LAB;  Service: Gastroenterology   • EPIDURAL BLOCK INJECTION Left 09/16/2021    Procedure: L3 L4 transforaminal epidural steroid injection ( [id number]);  Surgeon: Lu Seo MD;  Location: [facility] MAIN OR;  Service: Pain Management    • EPIDURAL BLOCK INJECTION N/A 07/12/2023    Procedure: L3 L4  LUMBAR epidural steroid injection ([id number]);  Surgeon: Daniel Viveros DO;  Location: [facility] MAIN OR;  Service: Pain Management    • EPIDURAL BLOCK INJECTION N/A 11/15/2023    Procedure: L4-L5  LUMBAR epidural steroid injection ([id number]);  Surgeon: Daniel Viveros DO;  Location: [facility] MAIN OR;  Service: Pain Management    • HEMORRHOID SURGERY     • LUMBAR EPIDURAL INJECTION  "N/A 04/19/2023    Procedure: L3 L4 LUMBAR epidural steroid injection [id number];  Surgeon: Daniel Viveros DO;  Location: [facility] MAIN OR;  Service: Pain Management    • SKIN BIOPSY       Social History     Substance and Sexual Activity   Alcohol Use Not Currently     Social History     Substance and Sexual Activity   Drug Use No     Social History     Tobacco Use   Smoking Status Never   • Passive exposure: Never   Smokeless Tobacco Never     Family History   Problem Relation Age of Onset   • Heart disease Mother    • Cancer Father         stomach   • Hypertension Father    • Stomach cancer Father    • Skin cancer Father    • Colon cancer Maternal Aunt      Health Maintenance Due   Topic   • Hepatitis C Screening    • HIV Screening    • DTaP,Tdap,and Td Vaccines (1 - Tdap)   • Zoster Vaccine (1 of 2)      Meds/Allergies       Current Outpatient Medications:   •  atorvastatin (LIPITOR) 10 mg tablet, TAKE 1 TABLET BY MOUTH DAILY AT BEDTIME, Disp: 30 tablet, Rfl: 5  •  cholecalciferol (VITAMIN D3) 1,000 units tablet, Take 1,000 Units by mouth daily, Disp: , Rfl:   •  doxazosin (CARDURA) 4 mg tablet, Take 4 mg by mouth daily at bedtime, Disp: , Rfl:   •  DULoxetine (CYMBALTA) 60 mg delayed release capsule, TAKE 1 CAPSULE DAILY, Disp: 30 capsule, Rfl: 1  •  tamsulosin (FLOMAX) 0.4 mg, Take 0.4 mg by mouth daily with dinner, Disp: , Rfl:       Objective:    Vitals:   /74 (BP Location: Right arm, Patient Position: Sitting, Cuff Size: Large)   Pulse 78   Ht 5' 10\" (1.778 m)   Wt 101 kg (221 lb 9.6 oz)   SpO2 96%   BMI 31.80 kg/m²   Body mass index is 31.8 kg/m².  Vitals:    02/13/25 1107   Weight: 101 kg (221 lb 9.6 oz)       Physical Exam  Vitals and nursing note reviewed.   Constitutional:       Appearance: Normal appearance.   Cardiovascular:      Rate and Rhythm: Normal rate and regular rhythm.      Heart sounds: Normal heart sounds.   Pulmonary:      Effort: Pulmonary effort is normal.      Breath sounds: " Normal breath sounds.   Abdominal:      Palpations: Abdomen is soft.   Musculoskeletal:         General: Normal range of motion.      Right lower leg: No edema.      Left lower leg: No edema.   Skin:     General: Skin is warm and dry.   Neurological:      Mental Status: He is alert and oriented to person, place, and time.   Psychiatric:         Behavior: Behavior normal.         Lab Review   Office Visit on 01/10/2025   Component Date Value Ref Range Status   • Glucose, Random 01/10/2025 88  70 - 99 mg/dL Final   • BUN 01/10/2025 15  8 - 27 mg/dL Final   • Creatinine 01/10/2025 0.99  0.76 - 1.27 mg/dL Final   • eGFR 01/10/2025 87  >59 mL/min/1.73 Final   • SL AMB BUN/CREATININE RATIO 01/10/2025 15  10 - 24 Final   • Sodium 01/10/2025 143  134 - 144 mmol/L Final   • Potassium 01/10/2025 3.8  3.5 - 5.2 mmol/L Final   • Chloride 01/10/2025 106  96 - 106 mmol/L Final   • CO2 01/10/2025 25  20 - 29 mmol/L Final   • CALCIUM 01/10/2025 8.9  8.6 - 10.2 mg/dL Final   • Protein, Total 01/10/2025 6.5  6.0 - 8.5 g/dL Final   • Albumin 01/10/2025 4.2  3.8 - 4.9 g/dL Final   • Globulin, Total 01/10/2025 2.3  1.5 - 4.5 g/dL Final   • TOTAL BILIRUBIN 01/10/2025 0.4  0.0 - 1.2 mg/dL Final   • Alk Phos Isoenzymes 01/10/2025 79  44 - 121 IU/L Final   • AST 01/10/2025 16  0 - 40 IU/L Final   • ALT 01/10/2025 17  0 - 44 IU/L Final   • Hemoglobin A1C 01/10/2025 5.1  4.8 - 5.6 % Final    Comment:          Prediabetes: 5.7 - 6.4           Diabetes: >6.4           Glycemic control for adults with diabetes: <7.0     • Estimated Average Glucose 01/10/2025 100  mg/dL Final   • Cholesterol, Total 01/10/2025 116  100 - 199 mg/dL Final   • Triglycerides 01/10/2025 67  0 - 149 mg/dL Final   • HDL 01/10/2025 32 (L)  >39 mg/dL Final   • VLDL Cholesterol Calculated 01/10/2025 14  5 - 40 mg/dL Final   • LDL Calculated 01/10/2025 70  0 - 99 mg/dL Final   • T. Chol/HDL Ratio 01/10/2025 3.6  0.0 - 5.0 ratio Final    Comment:                                    T. Chol/HDL Ratio                                              Men  Women                                1/2 Avg.Risk  3.4    3.3                                    Avg.Risk  5.0    4.4                                 2X Avg.Risk  9.6    7.1                                 3X Avg.Risk 23.4   11.0     • TSH 01/10/2025 1.660  0.450 - 4.500 uIU/mL Final   • Specific Gravity 01/10/2025 1.024  1.005 - 1.030 Final   • Ph 01/10/2025 6.0  5.0 - 7.5 Final   • Color UA 01/10/2025 Yellow  Yellow Final   • Urine Appearance 01/10/2025 Clear  Clear Final   • Leukocyte Esterase 01/10/2025 Negative  Negative Final   • Protein 01/10/2025 Trace  Negative/Trace Final   • Glucose, 24 HR Urine 01/10/2025 Negative  Negative Final   • Ketone, Urine 01/10/2025 Negative  Negative Final   • Blood, Urine 01/10/2025 Negative  Negative Final   • Bilirubin, Urine 01/10/2025 Negative  Negative Final   • Urobilinogen Urine 01/10/2025 0.2  0.2 - 1.0 mg/dL Final   • Nitrites Urine 01/10/2025 Negative  Negative Final   • Microscopic Examination 01/10/2025 Comment   Final    Microscopic follows if indicated.   • Microscopic Examination 01/10/2025 See below:   Final    Microscopic was indicated and was performed.   • White Blood Cell Count 01/10/2025 5.9  3.4 - 10.8 x10E3/uL Final   • Red Blood Cell Count 01/10/2025 4.59  4.14 - 5.80 x10E6/uL Final   • Hemoglobin 01/10/2025 13.5  13.0 - 17.7 g/dL Final   • HCT 01/10/2025 41.0  37.5 - 51.0 % Final   • MCV 01/10/2025 89  79 - 97 fL Final   • MCH 01/10/2025 29.4  26.6 - 33.0 pg Final   • MCHC 01/10/2025 32.9  31.5 - 35.7 g/dL Final   • RDW 01/10/2025 12.7  11.6 - 15.4 % Final   • Platelet Count 01/10/2025 200  150 - 450 x10E3/uL Final   • Neutrophils 01/10/2025 63  Not Estab. % Final   • Lymphocytes 01/10/2025 27  Not Estab. % Final   • Monocytes 01/10/2025 7  Not Estab. % Final   • Eosinophils 01/10/2025 2  Not Estab. % Final   • Basophils PCT 01/10/2025 1  Not Estab. % Final   • Neutrophils  "(Absolute) 01/10/2025 3.7  1.4 - 7.0 x10E3/uL Final   • Lymphocytes (Absolute) 01/10/2025 1.6  0.7 - 3.1 x10E3/uL Final   • Monocytes (Absolute) 01/10/2025 0.4  0.1 - 0.9 x10E3/uL Final   • Eosinophils (Absolute) 01/10/2025 0.1  0.0 - 0.4 x10E3/uL Final   • Basophils ABS 01/10/2025 0.0  0.0 - 0.2 x10E3/uL Final   • Immature Granulocytes 01/10/2025 0  Not Estab. % Final   • Immature Granulocytes (Absolute) 01/10/2025 0.0  0.0 - 0.1 x10E3/uL Final   • 25-HYDROXY VIT D 01/10/2025 20.0 (L)  30.0 - 100.0 ng/mL Final    Comment: Vitamin D deficiency has been defined by the [place] of  Medicine and an Endocrine Society practice guideline as a  level of serum 25-OH vitamin D less than 20 ng/mL (1,2).  The Endocrine Society went on to further define vitamin D  insufficiency as a level between 21 and 29 ng/mL (2).  1. IOM ([place] of Medicine). 2010. Dietary reference     intakes for calcium and D. [place]: The     National Academies Press.  2. Roberto MF, Erik NC, Kamran MESSER, et al.     Evaluation, treatment, and prevention of vitamin D     deficiency: an Endocrine Society clinical practice     guideline. JCEM. 2011 Jul; 96(7):1911-30.     • SL AMB WBC, URINE 01/10/2025 None seen  0 - 5 /hpf Final   • RBC, Urine 01/10/2025 None seen  0 - 2 /hpf Final   • Epithelial Cells (non renal) 01/10/2025 None seen  0 - 10 /hpf Final   • Casts 01/10/2025 None seen  None seen /lpf Final   • Bacteria, Urine 01/10/2025 None seen  None seen/Few Final         Andreas Jones MD        \"This note has been constructed using a voice recognition system.Therefore there may be syntax, spelling, and/or grammatical errors. Please call if you have any questions. \"  "

## 2025-02-13 ENCOUNTER — OFFICE VISIT (OUTPATIENT)
Dept: FAMILY MEDICINE CLINIC | Facility: CLINIC | Age: 60
End: 2025-02-13
Payer: COMMERCIAL

## 2025-02-13 VITALS
DIASTOLIC BLOOD PRESSURE: 74 MMHG | HEART RATE: 78 BPM | OXYGEN SATURATION: 96 % | BODY MASS INDEX: 31.73 KG/M2 | HEIGHT: 70 IN | SYSTOLIC BLOOD PRESSURE: 129 MMHG | WEIGHT: 221.6 LBS

## 2025-02-13 DIAGNOSIS — E66.811 CLASS 1 OBESITY DUE TO EXCESS CALORIES WITHOUT SERIOUS COMORBIDITY WITH BODY MASS INDEX (BMI) OF 31.0 TO 31.9 IN ADULT: ICD-10-CM

## 2025-02-13 DIAGNOSIS — R79.89 ELEVATED SERUM CREATININE: ICD-10-CM

## 2025-02-13 DIAGNOSIS — N40.0 BENIGN PROSTATIC HYPERPLASIA WITHOUT LOWER URINARY TRACT SYMPTOMS: Primary | ICD-10-CM

## 2025-02-13 DIAGNOSIS — N62 GYNECOMASTIA: ICD-10-CM

## 2025-02-13 DIAGNOSIS — E66.09 CLASS 1 OBESITY DUE TO EXCESS CALORIES WITHOUT SERIOUS COMORBIDITY WITH BODY MASS INDEX (BMI) OF 31.0 TO 31.9 IN ADULT: ICD-10-CM

## 2025-02-13 DIAGNOSIS — E78.5 DYSLIPIDEMIA: ICD-10-CM

## 2025-02-13 PROCEDURE — 99213 OFFICE O/P EST LOW 20 MIN: CPT | Performed by: INTERNAL MEDICINE

## 2025-02-13 RX ORDER — ERGOCALCIFEROL 1.25 MG/1
CAPSULE, LIQUID FILLED ORAL
COMMUNITY
Start: 2025-01-13 | End: 2025-02-13 | Stop reason: ALTCHOICE

## 2025-02-13 NOTE — ASSESSMENT & PLAN NOTE
Patient with BPH being followed by the urologist currently taking tamsulosin 0.4 mg daily and Cardura 4 mg daily continue

## 2025-02-13 NOTE — ASSESSMENT & PLAN NOTE
Patient is on atorvastatin 10 mg at bedtime lipid profile shows cholesterol 116 triglycerides 67 HDL 32 and LDL at 70 continue with the current dosage of medication atorvastatin 10 mg at bedtime

## 2025-02-18 ENCOUNTER — OFFICE VISIT (OUTPATIENT)
Age: 60
End: 2025-02-18
Payer: COMMERCIAL

## 2025-02-18 VITALS — WEIGHT: 217.04 LBS | BODY MASS INDEX: 31.07 KG/M2 | HEIGHT: 70 IN | RESPIRATION RATE: 17 BRPM

## 2025-02-18 DIAGNOSIS — M20.42 HAMMER TOES OF BOTH FEET: ICD-10-CM

## 2025-02-18 DIAGNOSIS — M79.672 PAIN IN BOTH FEET: Primary | ICD-10-CM

## 2025-02-18 DIAGNOSIS — M21.962 ACQUIRED DEFORMITY OF LEFT FOOT: ICD-10-CM

## 2025-02-18 DIAGNOSIS — M21.961 ACQUIRED DEFORMITY OF RIGHT FOOT: ICD-10-CM

## 2025-02-18 DIAGNOSIS — B35.3 TINEA PEDIS OF BOTH FEET: ICD-10-CM

## 2025-02-18 DIAGNOSIS — M20.41 HAMMER TOES OF BOTH FEET: ICD-10-CM

## 2025-02-18 DIAGNOSIS — B35.1 ONYCHOMYCOSIS: ICD-10-CM

## 2025-02-18 DIAGNOSIS — M79.671 PAIN IN BOTH FEET: Primary | ICD-10-CM

## 2025-02-18 PROCEDURE — 99213 OFFICE O/P EST LOW 20 MIN: CPT | Performed by: PODIATRIST

## 2025-02-18 NOTE — PROGRESS NOTES
Assessment/Plan:  Pain upon ambulation.  Hammertoe formation.  Pes planus.  Need for orthotics.  Ingrown toenail.  Paronychia.  Acquired deformity foot bilateral.  Acquired pes planus bilateral.  History of contusion right big toe     Plan.  Chart reviewed.  Notes reviewed.  Foot exam performed.  Patient educated on condition.  Patient will use orthotics daily.   He will stretch daily.  Patient advised on proper shoe style and sizing.  Patient will use orthotics as directed.  Pedal hygiene care advised upon.  Patient advised on aftercare.  Return for follow-up.  Watch for signs of infection.            Diagnoses and all orders for this visit:     Pain in both feet     Onychomycosis     Hammer toes of both feet     Ingrown toenail     Hammer toe of right foot     Acquired deformity foot bilateral        Acquired pes planus.            Subjective:  Patient has pain upon ambulation.  He has pain in his toes with ambulation.  He has painful hammertoes.  He uses orthotics daily.  No history of trauma.  Patient believes his orthotics are no longer working.  He is requesting new orthotics     No Known Allergies        Current Outpatient Medications:     cholecalciferol (VITAMIN D3) 1,000 units tablet, Take 1,000 Units by mouth daily, Disp: , Rfl:     Cholecalciferol (VITAMIN D3) 17359 units CAPS, Take by mouth once a week, Disp: , Rfl:     diclofenac sodium (VOLTAREN) 1 %, Apply 4 g topically 4 (four) times a day (Patient taking differently: Apply 4 g topically 4 (four) times a day ), Disp: 4 Tube, Rfl: 1    doxazosin (CARDURA) 4 mg tablet, Take 4 mg by mouth daily at bedtime, Disp: , Rfl:     meloxicam (MOBIC) 7.5 mg tablet, Take 1 tablet (7.5 mg total) by mouth daily for 10 days (Patient not taking: Reported on 11/20/2019), Disp: 10 tablet, Rfl: 0    tamsulosin (FLOMAX) 0.4 mg, Take 0.4 mg by mouth daily with dinner, Disp: , Rfl:            Patient Active Problem List   Diagnosis    Plantar fasciitis    Pain in both  feet    Congenital pes planus of right foot    Congenital pes planus of left foot    Tinea pedis of both feet    Dark stools             Patient ID: Jose Olson is a 59 y.o. male.     HPI     The following portions of the patient's history were reviewed and updated as appropriate:      family history includes Cancer in his father; Colon cancer in his maternal aunt; Heart disease in his mother; Hypertension in his father; Stomach cancer in his father.       reports that he has never smoked. He has never used smokeless tobacco. He reports that he drinks alcohol. He reports that he does not use drugs.      Objective:  Patient's shoes and socks removed.   Foot ExamPhysical Exam          Pulses palpable bilateral PT DP and PT pulses 2/4 bilateral  Significant pes planus bilateral, there is decreased  of the arch on weight-bearing  Right 2nd digit there is medial deviation of the PIPJ mild pain on palpation of 2nd MPJ mild swelling  Severe hallux limitus bilateral left worse than right less than 15° of dorsiflexion 1st ray  No hypermobility of the 1st ray  Muscle strength 5/5 all groups bilateral feet and ankles  Moderate equinus bilateral 5° dorsiflexion knee flexed and extended  Negative edema negative erythema no signs of infection  Nails are thickened discolored dystrophic mild onychomycosis  Ingrown nail both borders bilateral hallux     Ingrown right hallux medial border.  Mild hyperkeratotic lesion on the medial border but no sign of infection  Mild pain on palpation plantar medial heel bilateral  Some rigid hammertoes 2nd digit bilateral

## 2025-03-04 DIAGNOSIS — M54.16 LUMBAR RADICULOPATHY: ICD-10-CM

## 2025-03-04 DIAGNOSIS — M48.061 LUMBAR FORAMINAL STENOSIS: ICD-10-CM

## 2025-03-04 RX ORDER — DULOXETIN HYDROCHLORIDE 60 MG/1
60 CAPSULE, DELAYED RELEASE ORAL DAILY
Qty: 30 CAPSULE | Refills: 1 | Status: SHIPPED | OUTPATIENT
Start: 2025-03-04

## 2025-03-14 ENCOUNTER — OFFICE VISIT (OUTPATIENT)
Dept: FAMILY MEDICINE CLINIC | Facility: CLINIC | Age: 60
End: 2025-03-14
Payer: COMMERCIAL

## 2025-03-14 VITALS
BODY MASS INDEX: 32.04 KG/M2 | HEIGHT: 70 IN | HEART RATE: 88 BPM | DIASTOLIC BLOOD PRESSURE: 80 MMHG | WEIGHT: 223.8 LBS | OXYGEN SATURATION: 98 % | SYSTOLIC BLOOD PRESSURE: 125 MMHG

## 2025-03-14 DIAGNOSIS — H61.23 BILATERAL IMPACTED CERUMEN: Primary | ICD-10-CM

## 2025-03-14 DIAGNOSIS — E66.09 CLASS 1 OBESITY DUE TO EXCESS CALORIES WITHOUT SERIOUS COMORBIDITY WITH BODY MASS INDEX (BMI) OF 32.0 TO 32.9 IN ADULT: ICD-10-CM

## 2025-03-14 DIAGNOSIS — N40.0 BENIGN PROSTATIC HYPERPLASIA WITHOUT LOWER URINARY TRACT SYMPTOMS: ICD-10-CM

## 2025-03-14 DIAGNOSIS — E78.5 DYSLIPIDEMIA: ICD-10-CM

## 2025-03-14 DIAGNOSIS — E66.811 CLASS 1 OBESITY DUE TO EXCESS CALORIES WITHOUT SERIOUS COMORBIDITY WITH BODY MASS INDEX (BMI) OF 32.0 TO 32.9 IN ADULT: ICD-10-CM

## 2025-03-14 PROCEDURE — 99213 OFFICE O/P EST LOW 20 MIN: CPT | Performed by: INTERNAL MEDICINE

## 2025-03-14 NOTE — ASSESSMENT & PLAN NOTE
Patient on atorvastatin 10 mg at bedtime last cholesterol 16 continue the same   Recommend intraocular pressure checks with Tonopen every 5-6 hours while admitted. Until she can follow up with an Ophthalmologist as outpatient (for possible laser peripheral iridotomy), recommend: Timolol BID, Brimonidine q8hrs, Latonoprost Qday, and Drozolamide BID OD.

## 2025-03-14 NOTE — ASSESSMENT & PLAN NOTE
Patient BMI 32.11 went up from the last time when he was here emphasized regarding diet exercise lifestyle modification.

## 2025-03-14 NOTE — PROGRESS NOTES
Office Visit Note  25     Jose Olson 59 y.o. male MRN: [id number]  : 1965    Assessment:     1. Bilateral impacted cerumen  Assessment & Plan:  Patient with impacted cerumen over the years more so on the left side not able to visualize the tympanic membrane patient is going to use Debrox eardrops and come back for the removal of the wax  2. Benign prostatic hyperplasia without lower urinary tract symptoms  Assessment & Plan:  Continue with daily tamsulosin and Cardura  3. Class 1 obesity due to excess calories without serious comorbidity with body mass index (BMI) of 32.0 to 32.9 in adult  Assessment & Plan:      Patient BMI 32.11 went up from the last time when he was here emphasized regarding diet exercise lifestyle modification.  4. Dyslipidemia  Assessment & Plan:  Patient on atorvastatin 10 mg at bedtime last cholesterol 16 continue the same             Discussion Summary and Plan:  Today's care plan and medications were reviewed with patient in detail and all their questions answered to their satisfaction.    Chief Complaint   Patient presents with   • Earache     Left ear      Subjective:  Patient is coming for difficulty with hearing especially on the left side examination showing wax in both the ears more so on the left side recommend patient to use some Debrox eardrops because the wax appears to be very solid and hard.  Recommend patient use Debrox eardrops and will have it flushed.    Earache   Associated symptoms include hearing loss. Pertinent negatives include no abdominal pain, coughing, rash, sore throat or vomiting.       The following portions of the patient's history were reviewed and updated as appropriate: allergies, current medications, past family history, past medical history, past social history, past surgical history and problem list.    Review of Systems   Constitutional:  Negative for chills and fever.   HENT:  Positive for hearing loss. Negative for sore throat.     Eyes:  Negative for pain and visual disturbance.   Respiratory:  Negative for cough and shortness of breath.    Cardiovascular:  Negative for chest pain and palpitations.   Gastrointestinal:  Negative for abdominal pain and vomiting.   Genitourinary:  Negative for dysuria and hematuria.   Musculoskeletal:  Negative for arthralgias and back pain.   Skin:  Negative for color change and rash.   Neurological:  Negative for seizures and syncope.   All other systems reviewed and are negative.        Historical Information   Patient Active Problem List   Diagnosis   • Plantar fasciitis   • Pain in both feet   • Congenital pes planus of right foot   • Congenital pes planus of left foot   • Tinea pedis of both feet   • Bilateral impacted cerumen   • Grade II hemorrhoids   • HTN (hypertension)   • Dyslipidemia   • Benign prostatic hyperplasia without lower urinary tract symptoms   • Class 1 obesity due to excess calories without serious comorbidity in adult   • URI (upper respiratory infection)   • Lumbar radiculopathy   • Shortness of breath   • Gynecomastia   • Overweight (BMI 25.0-29.9)   • Elevated serum creatinine     Past Medical History:   Diagnosis Date   • BPH (benign prostatic hyperplasia)    • Class 2 obesity in adult 10/18/2022   • Hypertension      Past Surgical History:   Procedure Laterality Date   • COLONOSCOPY N/A 10/18/2017    Procedure: COLONOSCOPY;  Surgeon: Gia Borrego MD;  Location: [facility] GI LAB;  Service: Gastroenterology   • EPIDURAL BLOCK INJECTION Left 09/16/2021    Procedure: L3 L4 transforaminal epidural steroid injection ( [id number]);  Surgeon: Lu Seo MD;  Location: [facility] MAIN OR;  Service: Pain Management    • EPIDURAL BLOCK INJECTION N/A 07/12/2023    Procedure: L3 L4  LUMBAR epidural steroid injection ([id number]);  Surgeon: Daniel Viveros DO;  Location: [facility] MAIN OR;  Service: Pain Management    • EPIDURAL BLOCK INJECTION N/A 11/15/2023    Procedure: L4-L5  LUMBAR epidural  "steroid injection ([id number]);  Surgeon: Dnaiel Viveros DO;  Location: [facility] MAIN OR;  Service: Pain Management    • HEMORRHOID SURGERY     • LUMBAR EPIDURAL INJECTION N/A 04/19/2023    Procedure: L3 L4 LUMBAR epidural steroid injection [id number];  Surgeon: Daniel Viveros DO;  Location: [facility] MAIN OR;  Service: Pain Management    • SKIN BIOPSY       Social History     Substance and Sexual Activity   Alcohol Use Not Currently     Social History     Substance and Sexual Activity   Drug Use No     Social History     Tobacco Use   Smoking Status Never   • Passive exposure: Never   Smokeless Tobacco Never     Family History   Problem Relation Age of Onset   • Heart disease Mother    • Cancer Father         stomach   • Hypertension Father    • Stomach cancer Father    • Skin cancer Father    • Colon cancer Maternal Aunt      Health Maintenance Due   Topic   • Hepatitis C Screening    • HIV Screening    • DTaP,Tdap,and Td Vaccines (1 - Tdap)   • Zoster Vaccine (1 of 2)      Meds/Allergies       Current Outpatient Medications:   •  atorvastatin (LIPITOR) 10 mg tablet, TAKE 1 TABLET BY MOUTH DAILY AT BEDTIME, Disp: 30 tablet, Rfl: 5  •  cholecalciferol (VITAMIN D3) 1,000 units tablet, Take 1,000 Units by mouth daily, Disp: , Rfl:   •  doxazosin (CARDURA) 4 mg tablet, Take 4 mg by mouth daily at bedtime, Disp: , Rfl:   •  DULoxetine (CYMBALTA) 60 mg delayed release capsule, TAKE 1 CAPSULE DAILY, Disp: 30 capsule, Rfl: 1  •  tamsulosin (FLOMAX) 0.4 mg, Take 0.4 mg by mouth daily with dinner, Disp: , Rfl:       Objective:    Vitals:   /80 (BP Location: Right arm, Patient Position: Sitting, Cuff Size: Standard)   Pulse 88   Ht 5' 10\" (1.778 m)   Wt 102 kg (223 lb 12.8 oz)   SpO2 98%   BMI 32.11 kg/m²   Body mass index is 32.11 kg/m².  Vitals:    03/14/25 1327   Weight: 102 kg (223 lb 12.8 oz)       Physical Exam  Vitals and nursing note reviewed.   Constitutional:       Appearance: Normal appearance.   HENT:      " Right Ear: There is impacted cerumen.      Left Ear: There is impacted cerumen.   Cardiovascular:      Rate and Rhythm: Normal rate and regular rhythm.      Heart sounds: Normal heart sounds.   Pulmonary:      Effort: Pulmonary effort is normal.      Breath sounds: Normal breath sounds.   Musculoskeletal:         General: Normal range of motion.      Right lower leg: No edema.      Left lower leg: No edema.   Neurological:      Mental Status: He is alert and oriented to person, place, and time.   Psychiatric:         Behavior: Behavior normal.         Lab Review   No visits with results within 2 Month(s) from this visit.   Latest known visit with results is:   Office Visit on 01/10/2025   Component Date Value Ref Range Status   • Glucose, Random 01/10/2025 88  70 - 99 mg/dL Final   • BUN 01/10/2025 15  8 - 27 mg/dL Final   • Creatinine 01/10/2025 0.99  0.76 - 1.27 mg/dL Final   • eGFR 01/10/2025 87  >59 mL/min/1.73 Final   • SL AMB BUN/CREATININE RATIO 01/10/2025 15  10 - 24 Final   • Sodium 01/10/2025 143  134 - 144 mmol/L Final   • Potassium 01/10/2025 3.8  3.5 - 5.2 mmol/L Final   • Chloride 01/10/2025 106  96 - 106 mmol/L Final   • CO2 01/10/2025 25  20 - 29 mmol/L Final   • CALCIUM 01/10/2025 8.9  8.6 - 10.2 mg/dL Final   • Protein, Total 01/10/2025 6.5  6.0 - 8.5 g/dL Final   • Albumin 01/10/2025 4.2  3.8 - 4.9 g/dL Final   • Globulin, Total 01/10/2025 2.3  1.5 - 4.5 g/dL Final   • TOTAL BILIRUBIN 01/10/2025 0.4  0.0 - 1.2 mg/dL Final   • Alk Phos Isoenzymes 01/10/2025 79  44 - 121 IU/L Final   • AST 01/10/2025 16  0 - 40 IU/L Final   • ALT 01/10/2025 17  0 - 44 IU/L Final   • Hemoglobin A1C 01/10/2025 5.1  4.8 - 5.6 % Final    Comment:          Prediabetes: 5.7 - 6.4           Diabetes: >6.4           Glycemic control for adults with diabetes: <7.0     • Estimated Average Glucose 01/10/2025 100  mg/dL Final   • Cholesterol, Total 01/10/2025 116  100 - 199 mg/dL Final   • Triglycerides 01/10/2025 67  0 - 149  mg/dL Final   • HDL 01/10/2025 32 (L)  >39 mg/dL Final   • VLDL Cholesterol Calculated 01/10/2025 14  5 - 40 mg/dL Final   • LDL Calculated 01/10/2025 70  0 - 99 mg/dL Final   • T. Chol/HDL Ratio 01/10/2025 3.6  0.0 - 5.0 ratio Final    Comment:                                   T. Chol/HDL Ratio                                              Men  Women                                1/2 Avg.Risk  3.4    3.3                                    Avg.Risk  5.0    4.4                                 2X Avg.Risk  9.6    7.1                                 3X Avg.Risk 23.4   11.0     • TSH 01/10/2025 1.660  0.450 - 4.500 uIU/mL Final   • Specific Gravity 01/10/2025 1.024  1.005 - 1.030 Final   • Ph 01/10/2025 6.0  5.0 - 7.5 Final   • Color UA 01/10/2025 Yellow  Yellow Final   • Urine Appearance 01/10/2025 Clear  Clear Final   • Leukocyte Esterase 01/10/2025 Negative  Negative Final   • Protein 01/10/2025 Trace  Negative/Trace Final   • Glucose, 24 HR Urine 01/10/2025 Negative  Negative Final   • Ketone, Urine 01/10/2025 Negative  Negative Final   • Blood, Urine 01/10/2025 Negative  Negative Final   • Bilirubin, Urine 01/10/2025 Negative  Negative Final   • Urobilinogen Urine 01/10/2025 0.2  0.2 - 1.0 mg/dL Final   • Nitrites Urine 01/10/2025 Negative  Negative Final   • Microscopic Examination 01/10/2025 Comment   Final    Microscopic follows if indicated.   • Microscopic Examination 01/10/2025 See below:   Final    Microscopic was indicated and was performed.   • White Blood Cell Count 01/10/2025 5.9  3.4 - 10.8 x10E3/uL Final   • Red Blood Cell Count 01/10/2025 4.59  4.14 - 5.80 x10E6/uL Final   • Hemoglobin 01/10/2025 13.5  13.0 - 17.7 g/dL Final   • HCT 01/10/2025 41.0  37.5 - 51.0 % Final   • MCV 01/10/2025 89  79 - 97 fL Final   • MCH 01/10/2025 29.4  26.6 - 33.0 pg Final   • MCHC 01/10/2025 32.9  31.5 - 35.7 g/dL Final   • RDW 01/10/2025 12.7  11.6 - 15.4 % Final   • Platelet Count 01/10/2025 200  150 - 450 x10E3/uL  "Final   • Neutrophils 01/10/2025 63  Not Estab. % Final   • Lymphocytes 01/10/2025 27  Not Estab. % Final   • Monocytes 01/10/2025 7  Not Estab. % Final   • Eosinophils 01/10/2025 2  Not Estab. % Final   • Basophils PCT 01/10/2025 1  Not Estab. % Final   • Neutrophils (Absolute) 01/10/2025 3.7  1.4 - 7.0 x10E3/uL Final   • Lymphocytes (Absolute) 01/10/2025 1.6  0.7 - 3.1 x10E3/uL Final   • Monocytes (Absolute) 01/10/2025 0.4  0.1 - 0.9 x10E3/uL Final   • Eosinophils (Absolute) 01/10/2025 0.1  0.0 - 0.4 x10E3/uL Final   • Basophils ABS 01/10/2025 0.0  0.0 - 0.2 x10E3/uL Final   • Immature Granulocytes 01/10/2025 0  Not Estab. % Final   • Immature Granulocytes (Absolute) 01/10/2025 0.0  0.0 - 0.1 x10E3/uL Final   • 25-HYDROXY VIT D 01/10/2025 20.0 (L)  30.0 - 100.0 ng/mL Final    Comment: Vitamin D deficiency has been defined by the [place] of  Medicine and an Endocrine Society practice guideline as a  level of serum 25-OH vitamin D less than 20 ng/mL (1,2).  The Endocrine Society went on to further define vitamin D  insufficiency as a level between 21 and 29 ng/mL (2).  1. IOM ([place] of Medicine). 2010. Dietary reference     intakes for calcium and D. [place]: The     National Academies Press.  2. Roberto MF, Erik NC, Kamran MESSER, et al.     Evaluation, treatment, and prevention of vitamin D     deficiency: an Endocrine Society clinical practice     guideline. JCEM. 2011 Jul; 96(7):1911-30.     • SL AMB WBC, URINE 01/10/2025 None seen  0 - 5 /hpf Final   • RBC, Urine 01/10/2025 None seen  0 - 2 /hpf Final   • Epithelial Cells (non renal) 01/10/2025 None seen  0 - 10 /hpf Final   • Casts 01/10/2025 None seen  None seen /lpf Final   • Bacteria, Urine 01/10/2025 None seen  None seen/Few Final         Andreas Jones MD        \"This note has been constructed using a voice recognition system.Therefore there may be syntax, spelling, and/or grammatical errors. Please call if you have any questions. " "\"  "

## 2025-03-14 NOTE — ASSESSMENT & PLAN NOTE
Patient with impacted cerumen over the years more so on the left side not able to visualize the tympanic membrane patient is going to use Debrox eardrops and come back for the removal of the wax

## 2025-03-20 ENCOUNTER — OFFICE VISIT (OUTPATIENT)
Dept: FAMILY MEDICINE CLINIC | Facility: CLINIC | Age: 60
End: 2025-03-20
Payer: COMMERCIAL

## 2025-03-20 VITALS
HEIGHT: 70 IN | OXYGEN SATURATION: 99 % | DIASTOLIC BLOOD PRESSURE: 72 MMHG | BODY MASS INDEX: 31.75 KG/M2 | SYSTOLIC BLOOD PRESSURE: 124 MMHG | WEIGHT: 221.8 LBS | HEART RATE: 95 BPM

## 2025-03-20 DIAGNOSIS — H61.23 BILATERAL IMPACTED CERUMEN: Primary | ICD-10-CM

## 2025-03-20 PROCEDURE — 99213 OFFICE O/P EST LOW 20 MIN: CPT | Performed by: INTERNAL MEDICINE

## 2025-03-20 NOTE — ASSESSMENT & PLAN NOTE
Patient with impacted cerumen in both the ears more so on the left side he has used Debrox eardrops for 5 days at flush the ears right side I could remove some wax but the left side is still has some impacted wax we will have him seen by the ENT for the removal of the same.

## 2025-03-20 NOTE — PROGRESS NOTES
Office Visit Note  25     Jose Olson 59 y.o. male MRN: [id number]  : 1965    Assessment:     1. Bilateral impacted cerumen  Assessment & Plan:  Patient with impacted cerumen in both the ears more so on the left side he has used Debrox eardrops for 5 days at flush the ears right side I could remove some wax but the left side is still has some impacted wax we will have him seen by the ENT for the removal of the same.  Orders:  -     Ambulatory Referral to Otolaryngology; Future             Discussion Summary and Plan:  Today's care plan and medications were reviewed with patient in detail and all their questions answered to their satisfaction.    Chief Complaint   Patient presents with   • Follow-up      Subjective:  Patient is coming here regarding hearing loss secondary to wax in both the ears more so on the left side.  Other diagnoses on him being hypertension, BPH, dyslipidemia, obesity.        The following portions of the patient's history were reviewed and updated as appropriate: allergies, current medications, past family history, past medical history, past social history, past surgical history and problem list.    Review of Systems   Constitutional:  Negative for chills and fever.   HENT:  Positive for hearing loss. Negative for ear pain and sore throat.    Eyes:  Negative for pain and visual disturbance.   Respiratory:  Negative for cough and shortness of breath.    Cardiovascular:  Negative for chest pain and palpitations.   Gastrointestinal:  Negative for abdominal pain and vomiting.   Genitourinary:  Negative for dysuria and hematuria.   Musculoskeletal:  Negative for arthralgias and back pain.   Skin:  Negative for color change and rash.   Neurological:  Negative for seizures and syncope.   All other systems reviewed and are negative.        Historical Information   Patient Active Problem List   Diagnosis   • Plantar fasciitis   • Pain in both feet   • Congenital pes planus of right  foot   • Congenital pes planus of left foot   • Tinea pedis of both feet   • Bilateral impacted cerumen   • Grade II hemorrhoids   • HTN (hypertension)   • Dyslipidemia   • Benign prostatic hyperplasia without lower urinary tract symptoms   • Class 1 obesity due to excess calories without serious comorbidity in adult   • URI (upper respiratory infection)   • Lumbar radiculopathy   • Shortness of breath   • Gynecomastia   • Overweight (BMI 25.0-29.9)   • Elevated serum creatinine     Past Medical History:   Diagnosis Date   • BPH (benign prostatic hyperplasia)    • Class 2 obesity in adult 10/18/2022   • Hypertension      Past Surgical History:   Procedure Laterality Date   • COLONOSCOPY N/A 10/18/2017    Procedure: COLONOSCOPY;  Surgeon: Gia Borrego MD;  Location: [facility] GI LAB;  Service: Gastroenterology   • EPIDURAL BLOCK INJECTION Left 09/16/2021    Procedure: L3 L4 transforaminal epidural steroid injection ( [id number]);  Surgeon: Lu Seo MD;  Location: [facility] MAIN OR;  Service: Pain Management    • EPIDURAL BLOCK INJECTION N/A 07/12/2023    Procedure: L3 L4  LUMBAR epidural steroid injection ([id number]);  Surgeon: Daniel Viveros DO;  Location: [facility] MAIN OR;  Service: Pain Management    • EPIDURAL BLOCK INJECTION N/A 11/15/2023    Procedure: L4-L5  LUMBAR epidural steroid injection ([id number]);  Surgeon: Daniel Viveros DO;  Location: [facility] MAIN OR;  Service: Pain Management    • HEMORRHOID SURGERY     • LUMBAR EPIDURAL INJECTION N/A 04/19/2023    Procedure: L3 L4 LUMBAR epidural steroid injection [id number];  Surgeon: Daniel Viveros DO;  Location: [facility] MAIN OR;  Service: Pain Management    • SKIN BIOPSY       Social History     Substance and Sexual Activity   Alcohol Use Not Currently     Social History     Substance and Sexual Activity   Drug Use No     Social History     Tobacco Use   Smoking Status Never   • Passive exposure: Never   Smokeless Tobacco Never     Family History   Problem  "Relation Age of Onset   • Heart disease Mother    • Cancer Father         stomach   • Hypertension Father    • Stomach cancer Father    • Skin cancer Father    • Colon cancer Maternal Aunt      Health Maintenance Due   Topic   • Hepatitis C Screening    • HIV Screening    • DTaP,Tdap,and Td Vaccines (1 - Tdap)   • Zoster Vaccine (1 of 2)      Meds/Allergies       Current Outpatient Medications:   •  atorvastatin (LIPITOR) 10 mg tablet, TAKE 1 TABLET BY MOUTH DAILY AT BEDTIME, Disp: 30 tablet, Rfl: 5  •  cholecalciferol (VITAMIN D3) 1,000 units tablet, Take 1,000 Units by mouth daily, Disp: , Rfl:   •  doxazosin (CARDURA) 4 mg tablet, Take 4 mg by mouth daily at bedtime, Disp: , Rfl:   •  DULoxetine (CYMBALTA) 60 mg delayed release capsule, TAKE 1 CAPSULE DAILY, Disp: 30 capsule, Rfl: 1  •  tamsulosin (FLOMAX) 0.4 mg, Take 0.4 mg by mouth daily with dinner, Disp: , Rfl:       Objective:    Vitals:   /72 (BP Location: Right arm, Patient Position: Sitting, Cuff Size: Standard)   Pulse 95   Ht 5' 10\" (1.778 m)   Wt 101 kg (221 lb 12.8 oz)   SpO2 99%   BMI 31.82 kg/m²   Body mass index is 31.82 kg/m².  Vitals:    03/20/25 1309   Weight: 101 kg (221 lb 12.8 oz)       Physical Exam  Vitals and nursing note reviewed.   Constitutional:       Appearance: Normal appearance.   HENT:      Right Ear: There is impacted cerumen.      Left Ear: There is impacted cerumen.   Cardiovascular:      Rate and Rhythm: Normal rate and regular rhythm.      Heart sounds: Normal heart sounds.   Pulmonary:      Effort: Pulmonary effort is normal.      Breath sounds: Normal breath sounds.   Musculoskeletal:      Right lower leg: No edema.      Left lower leg: No edema.   Neurological:      Mental Status: He is alert.         Lab Review   No visits with results within 2 Month(s) from this visit.   Latest known visit with results is:   Office Visit on 01/10/2025   Component Date Value Ref Range Status   • Glucose, Random 01/10/2025 88  " 70 - 99 mg/dL Final   • BUN 01/10/2025 15  8 - 27 mg/dL Final   • Creatinine 01/10/2025 0.99  0.76 - 1.27 mg/dL Final   • eGFR 01/10/2025 87  >59 mL/min/1.73 Final   • SL AMB BUN/CREATININE RATIO 01/10/2025 15  10 - 24 Final   • Sodium 01/10/2025 143  134 - 144 mmol/L Final   • Potassium 01/10/2025 3.8  3.5 - 5.2 mmol/L Final   • Chloride 01/10/2025 106  96 - 106 mmol/L Final   • CO2 01/10/2025 25  20 - 29 mmol/L Final   • CALCIUM 01/10/2025 8.9  8.6 - 10.2 mg/dL Final   • Protein, Total 01/10/2025 6.5  6.0 - 8.5 g/dL Final   • Albumin 01/10/2025 4.2  3.8 - 4.9 g/dL Final   • Globulin, Total 01/10/2025 2.3  1.5 - 4.5 g/dL Final   • TOTAL BILIRUBIN 01/10/2025 0.4  0.0 - 1.2 mg/dL Final   • Alk Phos Isoenzymes 01/10/2025 79  44 - 121 IU/L Final   • AST 01/10/2025 16  0 - 40 IU/L Final   • ALT 01/10/2025 17  0 - 44 IU/L Final   • Hemoglobin A1C 01/10/2025 5.1  4.8 - 5.6 % Final    Comment:          Prediabetes: 5.7 - 6.4           Diabetes: >6.4           Glycemic control for adults with diabetes: <7.0     • Estimated Average Glucose 01/10/2025 100  mg/dL Final   • Cholesterol, Total 01/10/2025 116  100 - 199 mg/dL Final   • Triglycerides 01/10/2025 67  0 - 149 mg/dL Final   • HDL 01/10/2025 32 (L)  >39 mg/dL Final   • VLDL Cholesterol Calculated 01/10/2025 14  5 - 40 mg/dL Final   • LDL Calculated 01/10/2025 70  0 - 99 mg/dL Final   • T. Chol/HDL Ratio 01/10/2025 3.6  0.0 - 5.0 ratio Final    Comment:                                   T. Chol/HDL Ratio                                              Men  Women                                1/2 Avg.Risk  3.4    3.3                                    Avg.Risk  5.0    4.4                                 2X Avg.Risk  9.6    7.1                                 3X Avg.Risk 23.4   11.0     • TSH 01/10/2025 1.660  0.450 - 4.500 uIU/mL Final   • Specific Gravity 01/10/2025 1.024  1.005 - 1.030 Final   • Ph 01/10/2025 6.0  5.0 - 7.5 Final   • Color UA 01/10/2025 Yellow  Yellow  Final   • Urine Appearance 01/10/2025 Clear  Clear Final   • Leukocyte Esterase 01/10/2025 Negative  Negative Final   • Protein 01/10/2025 Trace  Negative/Trace Final   • Glucose, 24 HR Urine 01/10/2025 Negative  Negative Final   • Ketone, Urine 01/10/2025 Negative  Negative Final   • Blood, Urine 01/10/2025 Negative  Negative Final   • Bilirubin, Urine 01/10/2025 Negative  Negative Final   • Urobilinogen Urine 01/10/2025 0.2  0.2 - 1.0 mg/dL Final   • Nitrites Urine 01/10/2025 Negative  Negative Final   • Microscopic Examination 01/10/2025 Comment   Final    Microscopic follows if indicated.   • Microscopic Examination 01/10/2025 See below:   Final    Microscopic was indicated and was performed.   • White Blood Cell Count 01/10/2025 5.9  3.4 - 10.8 x10E3/uL Final   • Red Blood Cell Count 01/10/2025 4.59  4.14 - 5.80 x10E6/uL Final   • Hemoglobin 01/10/2025 13.5  13.0 - 17.7 g/dL Final   • HCT 01/10/2025 41.0  37.5 - 51.0 % Final   • MCV 01/10/2025 89  79 - 97 fL Final   • MCH 01/10/2025 29.4  26.6 - 33.0 pg Final   • MCHC 01/10/2025 32.9  31.5 - 35.7 g/dL Final   • RDW 01/10/2025 12.7  11.6 - 15.4 % Final   • Platelet Count 01/10/2025 200  150 - 450 x10E3/uL Final   • Neutrophils 01/10/2025 63  Not Estab. % Final   • Lymphocytes 01/10/2025 27  Not Estab. % Final   • Monocytes 01/10/2025 7  Not Estab. % Final   • Eosinophils 01/10/2025 2  Not Estab. % Final   • Basophils PCT 01/10/2025 1  Not Estab. % Final   • Neutrophils (Absolute) 01/10/2025 3.7  1.4 - 7.0 x10E3/uL Final   • Lymphocytes (Absolute) 01/10/2025 1.6  0.7 - 3.1 x10E3/uL Final   • Monocytes (Absolute) 01/10/2025 0.4  0.1 - 0.9 x10E3/uL Final   • Eosinophils (Absolute) 01/10/2025 0.1  0.0 - 0.4 x10E3/uL Final   • Basophils ABS 01/10/2025 0.0  0.0 - 0.2 x10E3/uL Final   • Immature Granulocytes 01/10/2025 0  Not Estab. % Final   • Immature Granulocytes (Absolute) 01/10/2025 0.0  0.0 - 0.1 x10E3/uL Final   • 25-HYDROXY VIT D 01/10/2025 20.0 (L)  30.0 -  "100.0 ng/mL Final    Comment: Vitamin D deficiency has been defined by the [place] of  Medicine and an Endocrine Society practice guideline as a  level of serum 25-OH vitamin D less than 20 ng/mL (1,2).  The Endocrine Society went on to further define vitamin D  insufficiency as a level between 21 and 29 ng/mL (2).  1. IOM ([place] of Medicine). 2010. Dietary reference     intakes for calcium and D. [place]: The     National Academies Press.  2. Roberto MF, Erik RODRIGUEZ, Kamran MESSER, et al.     Evaluation, treatment, and prevention of vitamin D     deficiency: an Endocrine Society clinical practice     guideline. JCEM. 2011 Jul; 96(7):1911-30.     • SL AMB WBC, URINE 01/10/2025 None seen  0 - 5 /hpf Final   • RBC, Urine 01/10/2025 None seen  0 - 2 /hpf Final   • Epithelial Cells (non renal) 01/10/2025 None seen  0 - 10 /hpf Final   • Casts 01/10/2025 None seen  None seen /lpf Final   • Bacteria, Urine 01/10/2025 None seen  None seen/Few Final         Andreas Jones MD        \"This note has been constructed using a voice recognition system.Therefore there may be syntax, spelling, and/or grammatical errors. Please call if you have any questions. \"    "

## 2025-03-21 ENCOUNTER — OFFICE VISIT (OUTPATIENT)
Dept: FAMILY MEDICINE CLINIC | Facility: CLINIC | Age: 60
End: 2025-03-21
Payer: COMMERCIAL

## 2025-03-21 VITALS
WEIGHT: 221 LBS | SYSTOLIC BLOOD PRESSURE: 120 MMHG | HEIGHT: 70 IN | DIASTOLIC BLOOD PRESSURE: 70 MMHG | BODY MASS INDEX: 31.64 KG/M2

## 2025-03-21 DIAGNOSIS — S00.411A ABRASION OF RIGHT EAR CANAL, INITIAL ENCOUNTER: Primary | ICD-10-CM

## 2025-03-21 DIAGNOSIS — H61.23 BILATERAL IMPACTED CERUMEN: ICD-10-CM

## 2025-03-21 DIAGNOSIS — H60.91 OTITIS EXTERNA OF RIGHT EAR, UNSPECIFIED CHRONICITY, UNSPECIFIED TYPE: ICD-10-CM

## 2025-03-21 PROCEDURE — 99213 OFFICE O/P EST LOW 20 MIN: CPT | Performed by: INTERNAL MEDICINE

## 2025-03-21 RX ORDER — NEOMYCIN SULFATE, POLYMYXIN B SULFATE, HYDROCORTISONE 3.5; 10000; 1 MG/ML; [USP'U]/ML; MG/ML
4 SOLUTION/ DROPS AURICULAR (OTIC) EVERY 6 HOURS SCHEDULED
Qty: 10 ML | Refills: 0 | Status: SHIPPED | OUTPATIENT
Start: 2025-03-21

## 2025-03-21 NOTE — ASSESSMENT & PLAN NOTE
Patient developed abrasion to the right ear canal from flushing with water and removing the wax on exam minimal dried of blood noted monitor for now if necessary eardrops.

## 2025-03-21 NOTE — ASSESSMENT & PLAN NOTE
Patient still has impacted cerumen in both the ears more so on the left side not coming out much with flushing of the ears going to be seen by the ENT

## 2025-03-21 NOTE — PROGRESS NOTES
"Name: Jose Olson      : 1965      MRN: [id number]  Encounter Provider: Andreas Jones MD  Encounter Date: 3/21/2025   Encounter department: [facility]  :  Assessment & Plan  Abrasion of right ear canal, initial encounter  Patient developed abrasion to the right ear canal from flushing with water and removing the wax on exam minimal dried of blood noted monitor for now if necessary eardrops.       Bilateral impacted cerumen  Patient still has impacted cerumen in both the ears more so on the left side not coming out much with flushing of the ears going to be seen by the ENT       Otitis externa of right ear, unspecified chronicity, unspecified type    Orders:  •  neomycin-polymyxin-hydrocortisone (CORTISPORIN) otic solution; Administer 4 drops to the right ear every 6 (six) hours           History of Present Illness   Patient was seen yesterday for vaccine the year tried to flush it out small amount came out on the right side last night patient noticed small amount of blood.  On examination dried blood noted in the right external ear.  Will give him Cortisporin eardrops      Review of Systems   Constitutional:  Negative for chills and fever.   HENT:  Negative for ear pain and sore throat.    Eyes:  Negative for pain and visual disturbance.   Respiratory:  Negative for cough and shortness of breath.    Cardiovascular:  Negative for chest pain and palpitations.   Gastrointestinal:  Negative for abdominal pain and vomiting.   Genitourinary:  Negative for dysuria and hematuria.   Musculoskeletal:  Negative for arthralgias and back pain.   Skin:  Negative for color change and rash.   Neurological:  Negative for seizures and syncope.   All other systems reviewed and are negative.      Objective   /70   Ht 5' 10\" (1.778 m)   Wt 100 kg (221 lb)   BMI 31.71 kg/m²      Physical Exam  Vitals and nursing note reviewed.   Constitutional:       General: He is not in acute distress.     " Appearance: He is well-developed.   HENT:      Head: Normocephalic and atraumatic.      Comments: Minimal dried blood noted right ear canal  Eyes:      Conjunctiva/sclera: Conjunctivae normal.   Cardiovascular:      Rate and Rhythm: Normal rate and regular rhythm.      Heart sounds: No murmur heard.  Pulmonary:      Effort: Pulmonary effort is normal. No respiratory distress.      Breath sounds: Normal breath sounds.   Abdominal:      Palpations: Abdomen is soft.      Tenderness: There is no abdominal tenderness.   Musculoskeletal:         General: No swelling.      Cervical back: Neck supple.   Skin:     General: Skin is warm and dry.      Capillary Refill: Capillary refill takes less than 2 seconds.   Neurological:      Mental Status: He is alert.   Psychiatric:         Mood and Affect: Mood normal.

## 2025-03-25 ENCOUNTER — OFFICE VISIT (OUTPATIENT)
Dept: OTOLARYNGOLOGY | Facility: CLINIC | Age: 60
End: 2025-03-25
Payer: COMMERCIAL

## 2025-03-25 VITALS
WEIGHT: 221 LBS | OXYGEN SATURATION: 97 % | TEMPERATURE: 97.4 F | BODY MASS INDEX: 31.64 KG/M2 | HEART RATE: 80 BPM | HEIGHT: 70 IN

## 2025-03-25 DIAGNOSIS — H61.23 BILATERAL IMPACTED CERUMEN: Primary | ICD-10-CM

## 2025-03-25 DIAGNOSIS — S00.411A ABRASION OF RIGHT EAR CANAL, INITIAL ENCOUNTER: ICD-10-CM

## 2025-03-25 PROCEDURE — 69210 REMOVE IMPACTED EAR WAX UNI: CPT | Performed by: NURSE PRACTITIONER

## 2025-03-25 PROCEDURE — 99203 OFFICE O/P NEW LOW 30 MIN: CPT | Performed by: NURSE PRACTITIONER

## 2025-03-25 NOTE — PATIENT INSTRUCTIONS
Wax care at home - avoidance of q-tips, may use cerumen softeners every one to two months.  Hydrocortisone cream pea sized amount on finger as needed for itching in ears.     Continue ear drops prescribed by Dr Jones - twice day for 5 more days to the right ear

## 2025-04-02 ENCOUNTER — OFFICE VISIT (OUTPATIENT)
Dept: FAMILY MEDICINE CLINIC | Facility: CLINIC | Age: 60
End: 2025-04-02
Payer: COMMERCIAL

## 2025-04-02 VITALS
OXYGEN SATURATION: 96 % | WEIGHT: 219.6 LBS | BODY MASS INDEX: 31.44 KG/M2 | SYSTOLIC BLOOD PRESSURE: 119 MMHG | HEART RATE: 95 BPM | DIASTOLIC BLOOD PRESSURE: 76 MMHG | HEIGHT: 70 IN

## 2025-04-02 DIAGNOSIS — S00.411D ABRASION OF RIGHT EAR CANAL, SUBSEQUENT ENCOUNTER: ICD-10-CM

## 2025-04-02 DIAGNOSIS — R19.7 DIARRHEA, UNSPECIFIED TYPE: Primary | ICD-10-CM

## 2025-04-02 PROCEDURE — 99213 OFFICE O/P EST LOW 20 MIN: CPT | Performed by: INTERNAL MEDICINE

## 2025-04-02 RX ORDER — DIPHENOXYLATE HYDROCHLORIDE AND ATROPINE SULFATE 2.5; .025 MG/1; MG/1
1 TABLET ORAL 4 TIMES DAILY PRN
Qty: 8 TABLET | Refills: 0 | Status: SHIPPED | OUTPATIENT
Start: 2025-04-02

## 2025-04-02 NOTE — ASSESSMENT & PLAN NOTE
Patient with diarrhea symptoms for last 4 days on and off Imodium not helping not foul-smelling no fever nausea vomiting no cough congestion cold symptoms.  Does not recall eating anything different.  Exam is unremarkable abdomen is soft nontender.  Recommend patient to stay on clear liquid diet for 24 hours if the symptoms are still persisting we will give some Lomotil tablets  Orders:  •  diphenoxylate-atropine (LOMOTIL) 2.5-0.025 mg per tablet; Take 1 tablet by mouth 4 (four) times a day as needed for diarrhea

## 2025-04-02 NOTE — ASSESSMENT & PLAN NOTE
Stable seen by the ENT had wax removed continue with Cortisporin eardrops ear exam unremarkable.

## 2025-04-02 NOTE — PROGRESS NOTES
Name: Jose Olson      : 1965      MRN: [id number]  Encounter Provider: Andreas Jones MD  Encounter Date: 2025   Encounter department: [facility]  :  Assessment & Plan  Diarrhea, unspecified type  Patient with diarrhea symptoms for last 4 days on and off Imodium not helping not foul-smelling no fever nausea vomiting no cough congestion cold symptoms.  Does not recall eating anything different.  Exam is unremarkable abdomen is soft nontender.  Recommend patient to stay on clear liquid diet for 24 hours if the symptoms are still persisting we will give some Lomotil tablets  Orders:  •  diphenoxylate-atropine (LOMOTIL) 2.5-0.025 mg per tablet; Take 1 tablet by mouth 4 (four) times a day as needed for diarrhea    Abrasion of right ear canal, subsequent encounter  Stable seen by the ENT had wax removed continue with Cortisporin eardrops ear exam unremarkable.              History of Present Illness   Patient is coming here for evaluation regarding symptoms of loose bowel movements for the last 4 days.  There was no blood or mucus in the stool no abdominal pain no nausea vomiting does not recall eating anything different.  No fever cough congestion cold symptoms.  He has tried Imodium but it is not helping him.  According to him he has no family or friends.  He lives alone.      Review of Systems   Constitutional:  Negative for chills and fever.   HENT:  Negative for ear pain and sore throat.    Eyes:  Negative for pain and visual disturbance.   Respiratory:  Negative for cough and shortness of breath.    Cardiovascular:  Negative for chest pain and palpitations.   Gastrointestinal:  Positive for diarrhea. Negative for abdominal pain and vomiting.   Genitourinary:  Negative for dysuria and hematuria.   Musculoskeletal:  Negative for arthralgias and back pain.   Skin:  Negative for color change and rash.   Neurological:  Negative for seizures and syncope.   All other systems  "reviewed and are negative.      Objective   /76 (BP Location: Right arm, Patient Position: Sitting, Cuff Size: Standard)   Pulse 95   Ht 5' 10\" (1.778 m)   Wt 99.6 kg (219 lb 9.6 oz)   SpO2 96%   BMI 31.51 kg/m²      Physical Exam  Vitals and nursing note reviewed.   Constitutional:       General: He is not in acute distress.     Appearance: He is well-developed.   HENT:      Head: Normocephalic and atraumatic.   Eyes:      Conjunctiva/sclera: Conjunctivae normal.   Cardiovascular:      Rate and Rhythm: Normal rate and regular rhythm.      Heart sounds: No murmur heard.  Pulmonary:      Effort: Pulmonary effort is normal. No respiratory distress.      Breath sounds: Normal breath sounds.   Abdominal:      Palpations: Abdomen is soft.      Tenderness: There is no abdominal tenderness.   Musculoskeletal:         General: No swelling.      Cervical back: Neck supple.   Skin:     General: Skin is warm and dry.      Capillary Refill: Capillary refill takes less than 2 seconds.   Neurological:      Mental Status: He is alert.   Psychiatric:         Mood and Affect: Mood normal.         "

## 2025-04-13 PROBLEM — H61.23 BILATERAL IMPACTED CERUMEN: Status: RESOLVED | Noted: 2021-05-26 | Resolved: 2025-04-13

## 2025-05-01 DIAGNOSIS — M48.061 LUMBAR FORAMINAL STENOSIS: ICD-10-CM

## 2025-05-01 DIAGNOSIS — M54.16 LUMBAR RADICULOPATHY: ICD-10-CM

## 2025-05-01 RX ORDER — DULOXETIN HYDROCHLORIDE 60 MG/1
60 CAPSULE, DELAYED RELEASE ORAL DAILY
Qty: 30 CAPSULE | Refills: 2 | Status: SHIPPED | OUTPATIENT
Start: 2025-05-01

## 2025-05-13 ENCOUNTER — OFFICE VISIT (OUTPATIENT)
Age: 60
End: 2025-05-13
Payer: COMMERCIAL

## 2025-05-13 VITALS — HEIGHT: 70 IN | BODY MASS INDEX: 31.92 KG/M2 | WEIGHT: 223 LBS | RESPIRATION RATE: 16 BRPM

## 2025-05-13 DIAGNOSIS — M21.961 ACQUIRED DEFORMITY OF RIGHT FOOT: ICD-10-CM

## 2025-05-13 DIAGNOSIS — B35.1 ONYCHOMYCOSIS: ICD-10-CM

## 2025-05-13 DIAGNOSIS — B35.3 TINEA PEDIS OF BOTH FEET: ICD-10-CM

## 2025-05-13 DIAGNOSIS — M20.41 HAMMER TOES OF BOTH FEET: ICD-10-CM

## 2025-05-13 DIAGNOSIS — M79.671 PAIN IN BOTH FEET: Primary | ICD-10-CM

## 2025-05-13 DIAGNOSIS — M21.962 ACQUIRED DEFORMITY OF LEFT FOOT: ICD-10-CM

## 2025-05-13 DIAGNOSIS — M20.42 HAMMER TOES OF BOTH FEET: ICD-10-CM

## 2025-05-13 DIAGNOSIS — M79.672 PAIN IN BOTH FEET: Primary | ICD-10-CM

## 2025-05-13 PROCEDURE — 99213 OFFICE O/P EST LOW 20 MIN: CPT | Performed by: PODIATRIST

## 2025-05-13 NOTE — PROGRESS NOTES
Assessment/Plan:  Pain upon ambulation.  Hammertoe formation.  Pes planus.  Need for orthotics.  Ingrown toenail.  Paronychia.  Acquired deformity foot bilateral.  Acquired pes planus bilateral.  History of contusion right big toe     Plan.  Chart reviewed.  Notes reviewed.  Foot exam performed.  Patient educated on condition.  Patient will use orthotics daily.   He will stretch daily.  Patient advised on proper shoe style and sizing.  Patient will use orthotics as directed.  Pedal hygiene care advised upon.  Patient advised on aftercare.  Return for follow-up.  Watch for signs of infection.            Diagnoses and all orders for this visit:     Pain in both feet     Onychomycosis     Hammer toes of both feet     Ingrown toenail     Hammer toe of right foot     Acquired deformity foot bilateral        Acquired pes planus.            Subjective:  Patient has pain upon ambulation.  He has pain in his toes with ambulation.  He has painful hammertoes.  He uses orthotics daily.  No history of trauma.  Patient believes his orthotics are no longer working.  He is requesting new orthotics     No Known Allergies        Current Outpatient Medications:     cholecalciferol (VITAMIN D3) 1,000 units tablet, Take 1,000 Units by mouth daily, Disp: , Rfl:     Cholecalciferol (VITAMIN D3) 45778 units CAPS, Take by mouth once a week, Disp: , Rfl:     diclofenac sodium (VOLTAREN) 1 %, Apply 4 g topically 4 (four) times a day (Patient taking differently: Apply 4 g topically 4 (four) times a day ), Disp: 4 Tube, Rfl: 1    doxazosin (CARDURA) 4 mg tablet, Take 4 mg by mouth daily at bedtime, Disp: , Rfl:     meloxicam (MOBIC) 7.5 mg tablet, Take 1 tablet (7.5 mg total) by mouth daily for 10 days (Patient not taking: Reported on 11/20/2019), Disp: 10 tablet, Rfl: 0    tamsulosin (FLOMAX) 0.4 mg, Take 0.4 mg by mouth daily with dinner, Disp: , Rfl:            Patient Active Problem List   Diagnosis    Plantar fasciitis    Pain in both  feet    Congenital pes planus of right foot    Congenital pes planus of left foot    Tinea pedis of both feet    Dark stools             Patient ID: Jose Olson is a 59 y.o. male.     HPI     The following portions of the patient's history were reviewed and updated as appropriate:      family history includes Cancer in his father; Colon cancer in his maternal aunt; Heart disease in his mother; Hypertension in his father; Stomach cancer in his father.       reports that he has never smoked. He has never used smokeless tobacco. He reports that he drinks alcohol. He reports that he does not use drugs.      Objective:  Patient's shoes and socks removed.   Foot ExamPhysical Exam          Pulses palpable bilateral PT DP and PT pulses 2/4 bilateral  Significant pes planus bilateral, there is decreased  of the arch on weight-bearing  Right 2nd digit there is medial deviation of the PIPJ mild pain on palpation of 2nd MPJ mild swelling  Severe hallux limitus bilateral left worse than right less than 15° of dorsiflexion 1st ray  No hypermobility of the 1st ray  Muscle strength 5/5 all groups bilateral feet and ankles  Moderate equinus bilateral 5° dorsiflexion knee flexed and extended  Negative edema negative erythema no signs of infection  Nails are thickened discolored dystrophic mild onychomycosis  Ingrown nail both borders bilateral hallux     Ingrown right hallux medial border.  Mild hyperkeratotic lesion on the medial border but no sign of infection  Mild pain on palpation plantar medial heel bilateral  Some rigid hammertoes 2nd digit bilateral

## 2025-06-26 DIAGNOSIS — E78.5 DYSLIPIDEMIA: ICD-10-CM

## 2025-06-27 RX ORDER — ATORVASTATIN CALCIUM 10 MG/1
10 TABLET, FILM COATED ORAL
Qty: 30 TABLET | Refills: 5 | Status: SHIPPED | OUTPATIENT
Start: 2025-06-27

## 2025-07-23 ENCOUNTER — PROCEDURE VISIT (OUTPATIENT)
Age: 60
End: 2025-07-23
Payer: COMMERCIAL

## 2025-07-23 VITALS — WEIGHT: 232 LBS | HEIGHT: 70 IN | RESPIRATION RATE: 17 BRPM | BODY MASS INDEX: 33.21 KG/M2

## 2025-07-23 DIAGNOSIS — M20.12 HALLUX VALGUS OF LEFT FOOT: ICD-10-CM

## 2025-07-23 DIAGNOSIS — M20.42 HAMMER TOES OF BOTH FEET: ICD-10-CM

## 2025-07-23 DIAGNOSIS — M20.41 HAMMER TOES OF BOTH FEET: ICD-10-CM

## 2025-07-23 DIAGNOSIS — M21.962 ACQUIRED DEFORMITY OF LEFT FOOT: ICD-10-CM

## 2025-07-23 DIAGNOSIS — M21.961 ACQUIRED DEFORMITY OF RIGHT FOOT: ICD-10-CM

## 2025-07-23 DIAGNOSIS — B35.1 ONYCHOMYCOSIS: ICD-10-CM

## 2025-07-23 DIAGNOSIS — M79.671 PAIN IN BOTH FEET: Primary | ICD-10-CM

## 2025-07-23 DIAGNOSIS — M79.672 PAIN IN BOTH FEET: Primary | ICD-10-CM

## 2025-07-23 PROCEDURE — 99213 OFFICE O/P EST LOW 20 MIN: CPT | Performed by: PODIATRIST

## 2025-07-23 NOTE — PATIENT INSTRUCTIONS
"Patient Education     Health risks of obesity   The Basics   Written by the doctors and editors at [facility]   What does it mean to have obesity? -- Doctors use a calculation called \"body mass index,\" or \"BMI,\" to decide whether a person is underweight, at a healthy weight, overweight, or has obesity.  Your BMI will tell you which category you are in based on your weight and height (figure 1):   If your BMI is between 25 and 29.9, you are overweight.   If your BMI is 30 or greater, you have obesity.  Obesity is a problem, because it increases the risks of many different health problems. It can also make it hard for you to move, breathe, and do other things that people who are at a healthy weight can do easily.  What are the health risks of obesity? -- Having obesity increases a person's risk of developing many health problems. Here are just a few examples:   Diabetes   High blood pressure   High cholesterol   Heart disease (including heart attacks)   Stroke   Sleep apnea (a disorder in which you stop breathing for short periods while asleep)   Asthma   Cancer  Does having obesity shorten a person's life? -- Yes. Studies show that people with obesity die younger than people who are a healthy weight. They also show that the risk of death goes up the heavier a person is. The degree of increased risk depends on how long the person has had obesity, and on what other medical problems they have.  People with \"central obesity\" might also be at risk of dying younger. Central obesity means carrying extra weight in the belly area, even if the BMI is normal.  Should I see an expert? -- Yes. If you are overweight or have obesity, you can talk to your doctor or nurse. They might have suggestions for healthy ways to lose weight. It can also help to work with a dietitian (food and nutrition expert). A dietitian can help you choose healthy foods and plan meals.  Are there medical treatments that can help me lose weight? -- Yes. There " are medicines and surgery to help with weight loss. But those treatments are only for people who have not been able to lose weight through lifestyle changes such as diet and exercise. Also, weight loss treatments do not take the place of diet and exercise. People who have those treatments must also change how they eat and how active they are.  What can I do to prevent the problems caused by obesity? -- The best thing that you can do is lose weight. But even if weight loss is not possible, you can improve your health and lower your risk if you:   Become more active - Many types of physical activity can help, including walking. You can start with a few minutes a day and add more as you get stronger and build up your endurance. Anything that gets your body moving is good for you.   Improve your diet - It is healthy to have regular meal times, eat smaller portions, and not skip meals. Limit sweets, and avoid processed foods. Try to eat more vegetables and fruits instead.   Quit smoking (if you smoke) - Some people start eating more after they stop smoking, so try to make healthy food choices. Even if it increases your appetite, quitting smoking is still one of the best things that you can do to improve your health.   Limit alcohol - For females, drink no more than 1 drink a day. For males, drink no more than 2 drinks a day.  What causes obesity? -- The thing that increases a person's risk the most is having an unhealthy lifestyle. Most people develop obesity because they eat too much, eat unhealthy foods, and move too little. That's especially true of people who watch too much TV. But there are also other things that seem to increase the risk of obesity that many people do not know about. Here are some things that might affect a person's chance of developing obesity:   Mother's habits during and after pregnancy - People who eat a lot of calories, have diabetes, or smoke during pregnancy have a higher chance of having  "babies who have obesity as adults. Also, babies who drink formula might be more likely than  babies to develop obesity later in life.   Habits and weight gain during childhood - Children or teens who are overweight or have obesity are more likely to become have obesity as an adult.   Sleeping too little - People who do not get enough sleep are more likely to develop obesity than people who sleep enough.   Taking certain medicines - Long-term use of certain medicines, such as some medicines to treat depression, can cause weight gain. If you are concerned that 1 of your medicines might be making you gain weight, talk to your doctor or nurse. They might be able to switch you to a different medicine instead.   Certain hormonal conditions - Some hormonal problems can increase the risk of developing obesity. For example, a condition called \"polycystic ovary syndrome\" can cause weight gain, along with other symptoms like irregular periods.  What if I want to get pregnant? -- If you are overweight or have obesity, it might be harder to get pregnant. For males, obesity can also cause sex problems, like having trouble getting or keeping an erection. This is more likely if you also have high blood pressure or diabetes.  What if my child has obesity? -- In children, obesity has many of the same risks as it does in adults. For example, it can increase the risk of diabetes, high blood pressure, asthma, and sleep apnea. It can also cause added problems related to childhood. For example, obesity can make children grow faster than normal and cause girls to go through puberty earlier than usual.  All topics are updated as new evidence becomes available and our peer review process is complete.  This topic retrieved from [employer] on: Feb 26, 2024.  Topic 87191 Version 18.0  Release: 32.2.4 - C32.56  © 2024 UpToDate, Inc. and/or its affiliates. All rights reserved.  figure 1: Your body mass index (BMI)     Find your height (in " feet and inches) in the top row. Then, find your weight (in pounds) in the first column. Now, find where the column for your height and the row for your weight meet. That is your BMI. For example, if you are 5-feet-9-inches tall and you weigh 260 pounds, your BMI is 38.  [employer] [address] Version 4.0  Consumer Information Use and Disclaimer   Disclaimer: This generalized information is a limited summary of diagnosis, treatment, and/or medication information. It is not meant to be comprehensive and should be used as a tool to help the user understand and/or assess potential diagnostic and treatment options. It does NOT include all information about conditions, treatments, medications, side effects, or risks that may apply to a specific patient. It is not intended to be medical advice or a substitute for the medical advice, diagnosis, or treatment of a health care provider based on the health care provider's examination and assessment of a patient's specific and unique circumstances. Patients must speak with a health care provider for complete information about their health, medical questions, and treatment options, including any risks or benefits regarding use of medications. This information does not endorse any treatments or medications as safe, effective, or approved for treating a specific patient. UpToDate, Inc. and its affiliates disclaim any warranty or liability relating to this information or the use thereof.The use of this information is governed by the Terms of Use, available at https://www.woltersClaimItuwer.com/en/know/clinical-effectiveness-terms. 2024© UpToDate, Inc. and its affiliates and/or licensors. All rights reserved.  Copyright   © 2024 UpToDate, Inc. and/or its affiliates. All rights reserved.

## 2025-07-23 NOTE — PROGRESS NOTES
Assessment/Plan:  Pain upon ambulation.  Hammertoe formation.  Pes planus.  Need for orthotics.  Ingrown toenail.  Paronychia.  Acquired deformity foot bilateral.  Acquired pes planus bilateral.  History of contusion right big toe     Plan.  Chart reviewed.  Notes reviewed.  Foot exam performed.  Patient educated on condition.  Patient will use orthotics daily.   He will stretch daily.  Patient advised on proper shoe style and sizing.  Patient will use orthotics as directed.  Pedal hygiene care advised upon.  Patient advised on aftercare.  Return for follow-up.  Watch for signs of infection.    Patient has noticed that his left bunion has become more problematic and painful.  He is inquiring about bunion surgery.  Patient advised on [facility] bunionectomy.            Diagnoses and all orders for this visit:     Pain in both feet     Onychomycosis     Hammer toes of both feet     Ingrown toenail     Hammer toe of right foot     Acquired deformity foot bilateral        Acquired pes planus.            Subjective:  Patient has pain upon ambulation.  He has pain in his toes with ambulation.  He has painful hammertoes.  He uses orthotics daily.  No history of trauma.  Patient believes his orthotics are no longer working.  He is requesting new orthotics     No Known Allergies        Current Outpatient Medications:     cholecalciferol (VITAMIN D3) 1,000 units tablet, Take 1,000 Units by mouth daily, Disp: , Rfl:     Cholecalciferol (VITAMIN D3) 84101 units CAPS, Take by mouth once a week, Disp: , Rfl:     diclofenac sodium (VOLTAREN) 1 %, Apply 4 g topically 4 (four) times a day (Patient taking differently: Apply 4 g topically 4 (four) times a day ), Disp: 4 Tube, Rfl: 1    doxazosin (CARDURA) 4 mg tablet, Take 4 mg by mouth daily at bedtime, Disp: , Rfl:     meloxicam (MOBIC) 7.5 mg tablet, Take 1 tablet (7.5 mg total) by mouth daily for 10 days (Patient not taking: Reported on 11/20/2019), Disp: 10 tablet, Rfl: 0     tamsulosin (FLOMAX) 0.4 mg, Take 0.4 mg by mouth daily with dinner, Disp: , Rfl:            Patient Active Problem List   Diagnosis    Plantar fasciitis    Pain in both feet    Congenital pes planus of right foot    Congenital pes planus of left foot    Tinea pedis of both feet    Dark stools             Patient ID: Jose Olson is a 59 y.o. male.     HPI     The following portions of the patient's history were reviewed and updated as appropriate:      family history includes Cancer in his father; Colon cancer in his maternal aunt; Heart disease in his mother; Hypertension in his father; Stomach cancer in his father.       reports that he has never smoked. He has never used smokeless tobacco. He reports that he drinks alcohol. He reports that he does not use drugs.      Objective:  Patient's shoes and socks removed.   Foot ExamPhysical Exam          Pulses palpable bilateral PT DP and PT pulses 2/4 bilateral  Significant pes planus bilateral, there is decreased  of the arch on weight-bearing  Right 2nd digit there is medial deviation of the PIPJ mild pain on palpation of 2nd MPJ mild swelling  Severe hallux limitus bilateral left worse than right less than 15° of dorsiflexion 1st ray  No hypermobility of the 1st ray  Muscle strength 5/5 all groups bilateral feet and ankles  Moderate equinus bilateral 5° dorsiflexion knee flexed and extended  Negative edema negative erythema no signs of infection  Nails are thickened discolored dystrophic mild onychomycosis  Ingrown nail both borders bilateral hallux     Ingrown right hallux medial border.  Mild hyperkeratotic lesion on the medial border but no sign of infection  Mild pain on palpation plantar medial heel bilateral  Some rigid hammertoes 2nd digit bilateral

## (undated) DEVICE — FORCEP ELECSURG RADIAL JAW4 2.2 X 240CM  HOT BX

## (undated) DEVICE — FLEXIBLE ADHESIVE BANDAGE,X-LARGE: Brand: CURITY

## (undated) DEVICE — BRUSH CYTOLOGY 3 MM 240 CM

## (undated) DEVICE — MARKER SPOT EX  BOWEL TATTOO SYRINGE

## (undated) DEVICE — GLOVE SRG BIOGEL 7.5

## (undated) DEVICE — 60 ML SYRINGE,REGULAR TIP: Brand: MONOJECT

## (undated) DEVICE — BAG SPECIMEN BIOHAZARD 10 X 10 ADHESIVE

## (undated) DEVICE — LUBRICANT SURGILUBE TUBE 4 OZ  FLIP TOP

## (undated) DEVICE — NEEDLE SPINAL 22G X 5IN QUINCKE

## (undated) DEVICE — BRUSH ENDO CLEANING DBL-HEADER

## (undated) DEVICE — TRAY PAIN SUPPORT

## (undated) DEVICE — 1200CC GUARDIAN II: Brand: GUARDIAN

## (undated) DEVICE — IV SET EXT SM BORE CARESITE 8IN

## (undated) DEVICE — SYRINGE EPI 8ML LUER SLIP LOSS OF RESISTANCE PLASTIC PERFIX

## (undated) DEVICE — SNARE BARBED 230CM

## (undated) DEVICE — Device: Brand: OLYMPUS

## (undated) DEVICE — RADIOLOGY STERILE LABELS: Brand: CENTURION

## (undated) DEVICE — GAUZE SPONGES,16 PLY: Brand: CURITY

## (undated) DEVICE — SYRINGE 5ML LL

## (undated) DEVICE — TRAVELKIT CONTAINS FIRST STEP KIT (200ML EP-4 KIT) AND SOILED SCOPE BAG - 1 KIT: Brand: TRAVELKIT CONTAINS FIRST STEP KIT AND SOILED SCOPE BAG

## (undated) DEVICE — SKIN MARKER DUAL TIP WITH RULER CAP, FLEXIBLE RULER AND LABELS: Brand: DEVON

## (undated) DEVICE — TRAY EPID CONT PERIFIX 18G X 3.5IN 5ML CLSD TIP DRAPE

## (undated) DEVICE — GLOVE EXAM NON-STRL NTRL PLUS LRG PF

## (undated) DEVICE — TOWEL SET X-RAY

## (undated) DEVICE — TUBING AUX CHANNEL

## (undated) DEVICE — CHLORAPREP APPLICATOR TINTED 10.5ML ONE-STEP

## (undated) DEVICE — SINGLE-USE BIOPSY FORCEPS: Brand: RADIAL JAW 4

## (undated) DEVICE — SMALL NEEDLE COUNTER NEST

## (undated) DEVICE — PLASTIC ADHESIVE BANDAGE: Brand: CURITY

## (undated) DEVICE — TRAY EPIDURAL PERIFIX 20GA X 3.5IN TUOHY 8ML

## (undated) DEVICE — AIRLIFE™  ADULT CUSHION NASAL CANNULA WITH 7 FOOT (2.1 M) CRUSH-RESISTANT OXYGEN TUBING, AND U/CONNECT-IT ADAPTER: Brand: AIRLIFE™

## (undated) DEVICE — TUBING BUBBLE CLEAR 5MM X 100 FT NS

## (undated) DEVICE — WIPES BABY PAMPERS SENSITIVE 36/PK

## (undated) DEVICE — MEDI-VAC YANKAUER SUCTION HANDLE: Brand: CARDINAL HEALTH

## (undated) DEVICE — DISPOSABLE BIOPSY VALVE MAJ-1555: Brand: SINGLE USE BIOPSY VALVE (STERILE)

## (undated) DEVICE — NEEDLE BLUNT 18 G X 1 1/2 W FILTER

## (undated) DEVICE — TRAP POLY

## (undated) DEVICE — GROUNDING PAD UNIVERSAL SLW

## (undated) DEVICE — SOLIDIFIER FLUID WASTE CONTROL 1500ML